# Patient Record
Sex: FEMALE | Race: WHITE | Employment: UNEMPLOYED | URBAN - METROPOLITAN AREA
[De-identification: names, ages, dates, MRNs, and addresses within clinical notes are randomized per-mention and may not be internally consistent; named-entity substitution may affect disease eponyms.]

---

## 2021-02-22 ENCOUNTER — HOSPITAL ENCOUNTER (INPATIENT)
Age: 86
LOS: 6 days | Discharge: NURSING, REHAB OR LONG-TERM CARE | DRG: 522 | End: 2021-02-28
Attending: GENERAL PRACTICE | Admitting: GENERAL PRACTICE
Payer: MEDICARE

## 2021-02-22 ENCOUNTER — APPOINTMENT (OUTPATIENT)
Dept: GENERAL RADIOLOGY | Age: 86
DRG: 522 | End: 2021-02-22
Payer: MEDICARE

## 2021-02-22 DIAGNOSIS — S72.001A CLOSED FRACTURE OF NECK OF RIGHT FEMUR, INITIAL ENCOUNTER (HCC): Primary | ICD-10-CM

## 2021-02-22 PROBLEM — S72.009A HIP FRACTURE, UNSPECIFIED LATERALITY, CLOSED, INITIAL ENCOUNTER (HCC): Status: ACTIVE | Noted: 2021-02-22

## 2021-02-22 LAB
ANION GAP SERPL CALCULATED.3IONS-SCNC: 7 MMOL/L (ref 4–16)
BASOPHILS ABSOLUTE: 0 K/CU MM
BASOPHILS RELATIVE PERCENT: 0.7 % (ref 0–1)
BUN BLDV-MCNC: 25 MG/DL (ref 6–23)
CALCIUM SERPL-MCNC: 8.3 MG/DL (ref 8.3–10.6)
CHLORIDE BLD-SCNC: 106 MMOL/L (ref 99–110)
CO2: 27 MMOL/L (ref 21–32)
CREAT SERPL-MCNC: 1.1 MG/DL (ref 0.6–1.1)
DIFFERENTIAL TYPE: ABNORMAL
EOSINOPHILS ABSOLUTE: 0.1 K/CU MM
EOSINOPHILS RELATIVE PERCENT: 0.9 % (ref 0–3)
GFR AFRICAN AMERICAN: 56 ML/MIN/1.73M2
GFR NON-AFRICAN AMERICAN: 46 ML/MIN/1.73M2
GLUCOSE BLD-MCNC: 129 MG/DL (ref 70–99)
HCT VFR BLD CALC: 35.7 % (ref 37–47)
HEMOGLOBIN: 11 GM/DL (ref 12.5–16)
IMMATURE NEUTROPHIL %: 0.4 % (ref 0–0.43)
INR BLD: 0.91 INDEX
LYMPHOCYTES ABSOLUTE: 0.8 K/CU MM
LYMPHOCYTES RELATIVE PERCENT: 13.5 % (ref 24–44)
MCH RBC QN AUTO: 29.6 PG (ref 27–31)
MCHC RBC AUTO-ENTMCNC: 30.8 % (ref 32–36)
MCV RBC AUTO: 96 FL (ref 78–100)
MONOCYTES ABSOLUTE: 0.2 K/CU MM
MONOCYTES RELATIVE PERCENT: 4 % (ref 0–4)
NUCLEATED RBC %: 0 %
PDW BLD-RTO: 14.6 % (ref 11.7–14.9)
PLATELET # BLD: 154 K/CU MM (ref 140–440)
PMV BLD AUTO: 10.7 FL (ref 7.5–11.1)
POTASSIUM SERPL-SCNC: 4.4 MMOL/L (ref 3.5–5.1)
PROTHROMBIN TIME: 11 SECONDS (ref 11.7–14.5)
RBC # BLD: 3.72 M/CU MM (ref 4.2–5.4)
SARS-COV-2, NAAT: NOT DETECTED
SEGMENTED NEUTROPHILS ABSOLUTE COUNT: 4.5 K/CU MM
SEGMENTED NEUTROPHILS RELATIVE PERCENT: 80.5 % (ref 36–66)
SODIUM BLD-SCNC: 140 MMOL/L (ref 135–145)
SOURCE: NORMAL
TOTAL IMMATURE NEUTOROPHIL: 0.02 K/CU MM
TOTAL NUCLEATED RBC: 0 K/CU MM
WBC # BLD: 5.6 K/CU MM (ref 4–10.5)

## 2021-02-22 PROCEDURE — 6360000002 HC RX W HCPCS: Performed by: GENERAL PRACTICE

## 2021-02-22 PROCEDURE — 73502 X-RAY EXAM HIP UNI 2-3 VIEWS: CPT

## 2021-02-22 PROCEDURE — 1200000000 HC SEMI PRIVATE

## 2021-02-22 PROCEDURE — 96376 TX/PRO/DX INJ SAME DRUG ADON: CPT

## 2021-02-22 PROCEDURE — 85025 COMPLETE CBC W/AUTO DIFF WBC: CPT

## 2021-02-22 PROCEDURE — 36415 COLL VENOUS BLD VENIPUNCTURE: CPT

## 2021-02-22 PROCEDURE — 6360000002 HC RX W HCPCS: Performed by: PHYSICIAN ASSISTANT

## 2021-02-22 PROCEDURE — 96375 TX/PRO/DX INJ NEW DRUG ADDON: CPT

## 2021-02-22 PROCEDURE — 85610 PROTHROMBIN TIME: CPT

## 2021-02-22 PROCEDURE — 96374 THER/PROPH/DIAG INJ IV PUSH: CPT

## 2021-02-22 PROCEDURE — 80048 BASIC METABOLIC PNL TOTAL CA: CPT

## 2021-02-22 PROCEDURE — 71045 X-RAY EXAM CHEST 1 VIEW: CPT

## 2021-02-22 PROCEDURE — 87635 SARS-COV-2 COVID-19 AMP PRB: CPT

## 2021-02-22 PROCEDURE — 99284 EMERGENCY DEPT VISIT MOD MDM: CPT

## 2021-02-22 PROCEDURE — 6370000000 HC RX 637 (ALT 250 FOR IP): Performed by: GENERAL PRACTICE

## 2021-02-22 RX ORDER — DILTIAZEM HYDROCHLORIDE 240 MG/1
240 CAPSULE, COATED, EXTENDED RELEASE ORAL DAILY
COMMUNITY
End: 2022-03-02 | Stop reason: SDUPTHER

## 2021-02-22 RX ORDER — CLONIDINE HYDROCHLORIDE 0.1 MG/1
0.1 TABLET ORAL 2 TIMES DAILY
Status: CANCELLED | OUTPATIENT
Start: 2021-02-22

## 2021-02-22 RX ORDER — SODIUM CHLORIDE 9 MG/ML
INJECTION, SOLUTION INTRAVENOUS CONTINUOUS
Status: DISCONTINUED | OUTPATIENT
Start: 2021-02-22 | End: 2021-02-24 | Stop reason: ALTCHOICE

## 2021-02-22 RX ORDER — ACETAMINOPHEN 325 MG/1
650 TABLET ORAL EVERY 6 HOURS PRN
Status: CANCELLED | OUTPATIENT
Start: 2021-02-22

## 2021-02-22 RX ORDER — ACETAMINOPHEN 650 MG/1
650 SUPPOSITORY RECTAL EVERY 6 HOURS PRN
Status: CANCELLED | OUTPATIENT
Start: 2021-02-22

## 2021-02-22 RX ORDER — SODIUM CHLORIDE 0.9 % (FLUSH) 0.9 %
10 SYRINGE (ML) INJECTION EVERY 12 HOURS SCHEDULED
Status: CANCELLED | OUTPATIENT
Start: 2021-02-22

## 2021-02-22 RX ORDER — MORPHINE SULFATE 4 MG/ML
4 INJECTION, SOLUTION INTRAMUSCULAR; INTRAVENOUS ONCE
Status: COMPLETED | OUTPATIENT
Start: 2021-02-22 | End: 2021-02-22

## 2021-02-22 RX ORDER — POLYETHYLENE GLYCOL 3350 17 G/17G
17 POWDER, FOR SOLUTION ORAL DAILY PRN
Status: CANCELLED | OUTPATIENT
Start: 2021-02-22

## 2021-02-22 RX ORDER — ONDANSETRON 2 MG/ML
4 INJECTION INTRAMUSCULAR; INTRAVENOUS EVERY 6 HOURS PRN
Status: DISCONTINUED | OUTPATIENT
Start: 2021-02-22 | End: 2021-02-28 | Stop reason: HOSPADM

## 2021-02-22 RX ORDER — LOSARTAN POTASSIUM 50 MG/1
50 TABLET ORAL DAILY
Status: CANCELLED | OUTPATIENT
Start: 2021-02-22

## 2021-02-22 RX ORDER — HYDROCHLOROTHIAZIDE 25 MG/1
12.5 TABLET ORAL DAILY
Status: ON HOLD | COMMUNITY
End: 2021-03-12 | Stop reason: HOSPADM

## 2021-02-22 RX ORDER — SODIUM CHLORIDE 9 MG/ML
INJECTION, SOLUTION INTRAVENOUS CONTINUOUS
Status: CANCELLED | OUTPATIENT
Start: 2021-02-22 | End: 2021-02-23

## 2021-02-22 RX ORDER — CLONIDINE HYDROCHLORIDE 0.1 MG/1
0.1 TABLET ORAL 2 TIMES DAILY
COMMUNITY
End: 2021-11-17

## 2021-02-22 RX ORDER — PROMETHAZINE HYDROCHLORIDE 12.5 MG/1
12.5 TABLET ORAL EVERY 6 HOURS PRN
Status: DISCONTINUED | OUTPATIENT
Start: 2021-02-22 | End: 2021-02-28 | Stop reason: HOSPADM

## 2021-02-22 RX ORDER — DILTIAZEM HYDROCHLORIDE 240 MG/1
240 CAPSULE, COATED, EXTENDED RELEASE ORAL DAILY
Status: CANCELLED | OUTPATIENT
Start: 2021-02-22

## 2021-02-22 RX ORDER — ACETAMINOPHEN 325 MG/1
650 TABLET ORAL EVERY 6 HOURS PRN
Status: DISCONTINUED | OUTPATIENT
Start: 2021-02-22 | End: 2021-02-28 | Stop reason: HOSPADM

## 2021-02-22 RX ORDER — GABAPENTIN 100 MG/1
300 CAPSULE ORAL NIGHTLY
COMMUNITY
End: 2022-02-16

## 2021-02-22 RX ORDER — HYDROCHLOROTHIAZIDE 12.5 MG/1
12.5 TABLET ORAL DAILY
Status: CANCELLED | OUTPATIENT
Start: 2021-02-22

## 2021-02-22 RX ORDER — POLYETHYLENE GLYCOL 3350 17 G/17G
17 POWDER, FOR SOLUTION ORAL DAILY PRN
Status: DISCONTINUED | OUTPATIENT
Start: 2021-02-22 | End: 2021-02-28 | Stop reason: HOSPADM

## 2021-02-22 RX ORDER — SODIUM CHLORIDE 0.9 % (FLUSH) 0.9 %
10 SYRINGE (ML) INJECTION PRN
Status: CANCELLED | OUTPATIENT
Start: 2021-02-22

## 2021-02-22 RX ORDER — PROMETHAZINE HYDROCHLORIDE 25 MG/1
12.5 TABLET ORAL EVERY 6 HOURS PRN
Status: CANCELLED | OUTPATIENT
Start: 2021-02-22

## 2021-02-22 RX ORDER — MORPHINE SULFATE 2 MG/ML
2 INJECTION, SOLUTION INTRAMUSCULAR; INTRAVENOUS
Status: DISCONTINUED | OUTPATIENT
Start: 2021-02-22 | End: 2021-02-24

## 2021-02-22 RX ORDER — ASPIRIN 81 MG/1
81 TABLET ORAL DAILY
Status: ON HOLD | COMMUNITY
End: 2021-03-12 | Stop reason: SDUPTHER

## 2021-02-22 RX ORDER — GABAPENTIN 100 MG/1
100 CAPSULE ORAL DAILY
Status: CANCELLED | OUTPATIENT
Start: 2021-02-22

## 2021-02-22 RX ORDER — CLONIDINE HYDROCHLORIDE 0.1 MG/1
0.1 TABLET ORAL 2 TIMES DAILY
Status: DISCONTINUED | OUTPATIENT
Start: 2021-02-22 | End: 2021-02-25

## 2021-02-22 RX ORDER — FENTANYL CITRATE 50 UG/ML
50 INJECTION, SOLUTION INTRAMUSCULAR; INTRAVENOUS
Status: DISCONTINUED | OUTPATIENT
Start: 2021-02-22 | End: 2021-02-22

## 2021-02-22 RX ORDER — ONDANSETRON 2 MG/ML
4 INJECTION INTRAMUSCULAR; INTRAVENOUS EVERY 6 HOURS PRN
Status: CANCELLED | OUTPATIENT
Start: 2021-02-22

## 2021-02-22 RX ORDER — SODIUM CHLORIDE 0.9 % (FLUSH) 0.9 %
10 SYRINGE (ML) INJECTION PRN
Status: DISCONTINUED | OUTPATIENT
Start: 2021-02-22 | End: 2021-02-28 | Stop reason: HOSPADM

## 2021-02-22 RX ORDER — DILTIAZEM HYDROCHLORIDE 240 MG/1
240 CAPSULE, COATED, EXTENDED RELEASE ORAL DAILY
Status: DISCONTINUED | OUTPATIENT
Start: 2021-02-23 | End: 2021-02-28 | Stop reason: HOSPADM

## 2021-02-22 RX ORDER — SODIUM CHLORIDE 0.9 % (FLUSH) 0.9 %
10 SYRINGE (ML) INJECTION EVERY 12 HOURS SCHEDULED
Status: DISCONTINUED | OUTPATIENT
Start: 2021-02-22 | End: 2021-02-28 | Stop reason: HOSPADM

## 2021-02-22 RX ORDER — LOSARTAN POTASSIUM 50 MG/1
50 TABLET ORAL DAILY
COMMUNITY
End: 2021-11-17

## 2021-02-22 RX ORDER — ACETAMINOPHEN 650 MG/1
650 SUPPOSITORY RECTAL EVERY 6 HOURS PRN
Status: DISCONTINUED | OUTPATIENT
Start: 2021-02-22 | End: 2021-02-28 | Stop reason: HOSPADM

## 2021-02-22 RX ORDER — MORPHINE SULFATE 4 MG/ML
2 INJECTION, SOLUTION INTRAMUSCULAR; INTRAVENOUS EVERY 30 MIN PRN
Status: DISCONTINUED | OUTPATIENT
Start: 2021-02-22 | End: 2021-02-22

## 2021-02-22 RX ADMIN — MORPHINE SULFATE 4 MG: 4 INJECTION, SOLUTION INTRAMUSCULAR; INTRAVENOUS at 20:12

## 2021-02-22 RX ADMIN — MORPHINE SULFATE 2 MG: 2 INJECTION, SOLUTION INTRAMUSCULAR; INTRAVENOUS at 23:57

## 2021-02-22 RX ADMIN — MORPHINE SULFATE 2 MG: 4 INJECTION, SOLUTION INTRAMUSCULAR; INTRAVENOUS at 19:45

## 2021-02-22 RX ADMIN — CLONIDINE HYDROCHLORIDE 0.1 MG: 0.1 TABLET ORAL at 23:57

## 2021-02-22 ASSESSMENT — PAIN SCALES - GENERAL
PAINLEVEL_OUTOF10: 10
PAINLEVEL_OUTOF10: 8

## 2021-02-22 NOTE — ED NOTES
Bed: H-08  Expected date:   Expected time:   Means of arrival:   Comments:  Medic-hold f/ T3     Norah Kanner  02/22/21 [id number]

## 2021-02-22 NOTE — ED NOTES
Patients POA would like to updated on patients condition.  [facility] [phone]     Lesli Montgomery RN  02/22/21 Jamey Juares RN  02/22/21 1091

## 2021-02-22 NOTE — ED PROVIDER NOTES
eMERGENCY dEPARTMENT eNCOUnter        [facility]    Chief Complaint   Patient presents with    Hip Pain     right hip pain, outward rotation from fall, +PMS       HPI    Benitze Roberts is a 80 y.o. female who presents after a fall. Patient lives independently at [facility]. She states she tripped over something and \"just went down. \"  Denies hitting her head or LOC. She states this happened about an hour & a half ago and then she had to crawl to the phone because she didn't have her LifeAlert pendant on at the time. Pain to the right hip. Constant, aching, severity 8/10. Worse when she was crawling, better at rest.  She is not on any blood thinners. She denies any other injury. REVIEW OF SYSTEMS    Constitutional:  Denies fever, chills. Eyes:  Denies changes in vision. HENT:  Denies headache, dizziness, lightheadedness. Respiratory:  Denies any shortness of breath. Cardiovascular:  Denies chest pain, palpitations. GI:  Denies abdominal pain, nausea, vomiting, diarrhea. :  Denies dysuria, frequency, urgency, urinary incontinence. Musculoskeletal:  + right hip pain. Integument:  Denies rashes, lesions. Neurologic:  Denies LOC. PAST MEDICAL & SURGICAL HISTORY    No past medical history on file. No past surgical history on file. CURRENT MEDICATIONS        ALLERGIES    No Known Allergies    SOCIAL & FAMILY HISTORY    Social History     Socioeconomic History    Marital status:       Spouse name: Not on file    Number of children: Not on file    Years of education: Not on file    Highest education level: Not on file   Occupational History    Not on file   Social Needs    Financial resource strain: Not on file    Food insecurity     Worry: Not on file     Inability: Not on file    Transportation needs     Medical: Not on file     Non-medical: Not on file   Tobacco Use    Smoking status: Not on file   Substance and Sexual Activity    Alcohol use: Not on file    Drug use: Not on file    Sexual activity: Not on file   Lifestyle    Physical activity     Days per week: Not on file     Minutes per session: Not on file    Stress: Not on file   Relationships    Social connections     Talks on phone: Not on file     Gets together: Not on file     Attends [employer] service: Not on file     Active member of club or organization: Not on file     Attends meetings of clubs or organizations: Not on file     Relationship status: Not on file    Intimate partner violence     Fear of current or ex partner: Not on file     Emotionally abused: Not on file     Physically abused: Not on file     Forced sexual activity: Not on file   Other Topics Concern    Not on file   Social History Narrative    Not on file     No family history on file. PHYSICAL EXAM    VITAL SIGNS: BP (!) 171/74   Pulse 74   Temp 98.2 °F (36.8 °C) (Oral)   Resp 20   Ht 5' 3\" (1.6 m)   Wt 150 lb (68 kg)   SpO2 92%   BMI 26.57 kg/m²   Constitutional:  Well-developed, well-nourished, no acute distress  HENT:  NC/AT. Respiratory:  Normal respiratory effort. Cardiovascular:  RRR. GI:  Abdomen soft, non-tender. :  No CVA tenderness. Musculoskeletal:  Slight shortening, external rotation of the RLE with ttp over the lateral hip. DP intact, able to wiggle toes. Sensation intact. Integument:  Well hydrated. No abrasions, no lacerations. Neurologic:  Alert and oriented. RADIOLOGY/PROCEDURES    Xr Chest Portable    Result Date: 2/22/2021  EXAMINATION: ONE XRAY VIEW OF THE CHEST 2/22/2021 7:29 pm COMPARISON: None. HISTORY: ORDERING SYSTEM PROVIDED HISTORY: fall TECHNOLOGIST PROVIDED HISTORY: Reason for exam:->fall Reason for Exam: fall Acuity: Acute Type of Exam: Initial FINDINGS: The lungs are without acute focal process. There is no effusion or pneumothorax. The cardiomediastinal silhouette is without acute process. The osseous structures are without acute process. No acute process.      Xr Hip 2-3 Vw W Pelvis Right    Result Date: 2/22/2021  EXAMINATION: ONE XRAY VIEW OF THE PELVIS AND TWO XRAY VIEWS RIGHT HIP 2/22/2021 7:29 pm COMPARISON: None. HISTORY: ORDERING SYSTEM PROVIDED HISTORY: fall TECHNOLOGIST PROVIDED HISTORY: Reason for exam:->fall Reason for Exam: fall Acuity: Acute Type of Exam: Initial FINDINGS: There is a mildly offset acute right femoral neck fracture. Generalized osteopenia. No additional fractures or evidence of dislocation. No pathologic bone lesion. Visualized soft tissues are within normal limits. There is a mildly offset acute right femoral neck fracture. Labs Reviewed   CBC WITH AUTO DIFFERENTIAL - Abnormal; Notable for the following components:       Result Value    RBC 3.72 (*)     Hemoglobin 11.0 (*)     Hematocrit 35.7 (*)     MCHC 30.8 (*)     Segs Relative 80.5 (*)     Lymphocytes % 13.5 (*)     All other components within normal limits   BASIC METABOLIC PANEL W/ REFLEX TO MG FOR LOW K - Abnormal; Notable for the following components:    BUN 25 (*)     Glucose 129 (*)     GFR Non- 46 (*)     GFR  56 (*)     All other components within normal limits   PROTIME-INR - Abnormal; Notable for the following components:    Protime 11.0 (*)     All other components within normal limits   COMPREHENSIVE METABOLIC PANEL   MAGNESIUM     ED COURSE & MEDICAL DECISION MAKING    -  Patient seen and evaluated in the emergency department. -  Triage and nursing notes reviewed and incorporated. -  Old chart records reviewed and incorporated. -  Supervising physician was Dr. Alfonso Mukherjee. Patient was seen independently. -  Differential diagnosis includes: fracture, dislocation, osteoarthritis, bursitis, infectious process, and others  -  Work-up included:  See above  -  ED medications:  Morphine, Fentanyl  -  Results discussed with patient.  + right hip fracture. I spoke with Dr. Loyd Braswell who will take patient to the OR tomorrow.   Dr. Attila Muir, covering patient's PCP Yosef Martin PA-C, will admit. In light of current events, I did utilize appropriate PPE (including N95 and surgical face mask, safety glasses, and gloves, as recommended by the health facility/national standard best practice, during my bedside interactions with the patient. FINAL IMPRESSION    1.  Closed fracture of neck of right femur, initial encounter [facility])                    Kerrie Forbes PA-C  02/22/21 2381

## 2021-02-23 ENCOUNTER — APPOINTMENT (OUTPATIENT)
Dept: GENERAL RADIOLOGY | Age: 86
DRG: 522 | End: 2021-02-23
Payer: MEDICARE

## 2021-02-23 ENCOUNTER — ANESTHESIA EVENT (OUTPATIENT)
Dept: OPERATING ROOM | Age: 86
DRG: 522 | End: 2021-02-23
Payer: MEDICARE

## 2021-02-23 ENCOUNTER — ANESTHESIA (OUTPATIENT)
Dept: OPERATING ROOM | Age: 86
DRG: 522 | End: 2021-02-23
Payer: MEDICARE

## 2021-02-23 VITALS — TEMPERATURE: 98 F | SYSTOLIC BLOOD PRESSURE: 133 MMHG | DIASTOLIC BLOOD PRESSURE: 58 MMHG | OXYGEN SATURATION: 100 %

## 2021-02-23 LAB
ALBUMIN SERPL-MCNC: 3.8 GM/DL (ref 3.4–5)
ALP BLD-CCNC: 135 IU/L (ref 40–128)
ALT SERPL-CCNC: 173 U/L (ref 10–40)
ANION GAP SERPL CALCULATED.3IONS-SCNC: 8 MMOL/L (ref 4–16)
AST SERPL-CCNC: 313 IU/L (ref 15–37)
BASOPHILS ABSOLUTE: 0 K/CU MM
BASOPHILS RELATIVE PERCENT: 0.2 % (ref 0–1)
BILIRUB SERPL-MCNC: 1.2 MG/DL (ref 0–1)
BUN BLDV-MCNC: 21 MG/DL (ref 6–23)
CALCIUM SERPL-MCNC: 8.6 MG/DL (ref 8.3–10.6)
CHLORIDE BLD-SCNC: 102 MMOL/L (ref 99–110)
CO2: 27 MMOL/L (ref 21–32)
CREAT SERPL-MCNC: 1.1 MG/DL (ref 0.6–1.1)
DIFFERENTIAL TYPE: ABNORMAL
EOSINOPHILS ABSOLUTE: 0 K/CU MM
EOSINOPHILS RELATIVE PERCENT: 0.1 % (ref 0–3)
GFR AFRICAN AMERICAN: 56 ML/MIN/1.73M2
GFR NON-AFRICAN AMERICAN: 46 ML/MIN/1.73M2
GLUCOSE BLD-MCNC: 135 MG/DL (ref 70–99)
HCT VFR BLD CALC: 39.3 % (ref 37–47)
HEMOGLOBIN: 11.9 GM/DL (ref 12.5–16)
IMMATURE NEUTROPHIL %: 0.5 % (ref 0–0.43)
LYMPHOCYTES ABSOLUTE: 0.7 K/CU MM
LYMPHOCYTES RELATIVE PERCENT: 7.9 % (ref 24–44)
MAGNESIUM: 1.8 MG/DL (ref 1.8–2.4)
MCH RBC QN AUTO: 29 PG (ref 27–31)
MCHC RBC AUTO-ENTMCNC: 30.3 % (ref 32–36)
MCV RBC AUTO: 95.9 FL (ref 78–100)
MONOCYTES ABSOLUTE: 0.5 K/CU MM
MONOCYTES RELATIVE PERCENT: 5 % (ref 0–4)
NUCLEATED RBC %: 0 %
PDW BLD-RTO: 14.6 % (ref 11.7–14.9)
PLATELET # BLD: 169 K/CU MM (ref 140–440)
PMV BLD AUTO: 10.5 FL (ref 7.5–11.1)
POTASSIUM SERPL-SCNC: 4.8 MMOL/L (ref 3.5–5.1)
RBC # BLD: 4.1 M/CU MM (ref 4.2–5.4)
SEGMENTED NEUTROPHILS ABSOLUTE COUNT: 8.1 K/CU MM
SEGMENTED NEUTROPHILS RELATIVE PERCENT: 86.3 % (ref 36–66)
SODIUM BLD-SCNC: 137 MMOL/L (ref 135–145)
TOTAL IMMATURE NEUTOROPHIL: 0.05 K/CU MM
TOTAL NUCLEATED RBC: 0 K/CU MM
TOTAL PROTEIN: 6.1 GM/DL (ref 6.4–8.2)
WBC # BLD: 9.4 K/CU MM (ref 4–10.5)

## 2021-02-23 PROCEDURE — 2580000003 HC RX 258: Performed by: PHYSICIAN ASSISTANT

## 2021-02-23 PROCEDURE — 93005 ELECTROCARDIOGRAM TRACING: CPT | Performed by: ANESTHESIOLOGY

## 2021-02-23 PROCEDURE — 83735 ASSAY OF MAGNESIUM: CPT

## 2021-02-23 PROCEDURE — 2580000003 HC RX 258: Performed by: ANESTHESIOLOGY

## 2021-02-23 PROCEDURE — 6360000002 HC RX W HCPCS: Performed by: PHYSICIAN ASSISTANT

## 2021-02-23 PROCEDURE — 2720000010 HC SURG SUPPLY STERILE: Performed by: ORTHOPAEDIC SURGERY

## 2021-02-23 PROCEDURE — 3700000001 HC ADD 15 MINUTES (ANESTHESIA): Performed by: ORTHOPAEDIC SURGERY

## 2021-02-23 PROCEDURE — 36415 COLL VENOUS BLD VENIPUNCTURE: CPT

## 2021-02-23 PROCEDURE — C1713 ANCHOR/SCREW BN/BN,TIS/BN: HCPCS | Performed by: ORTHOPAEDIC SURGERY

## 2021-02-23 PROCEDURE — 7100000000 HC PACU RECOVERY - FIRST 15 MIN: Performed by: ORTHOPAEDIC SURGERY

## 2021-02-23 PROCEDURE — 80053 COMPREHEN METABOLIC PANEL: CPT

## 2021-02-23 PROCEDURE — 6370000000 HC RX 637 (ALT 250 FOR IP): Performed by: GENERAL PRACTICE

## 2021-02-23 PROCEDURE — 7100000001 HC PACU RECOVERY - ADDTL 15 MIN: Performed by: ORTHOPAEDIC SURGERY

## 2021-02-23 PROCEDURE — 6360000002 HC RX W HCPCS: Performed by: GENERAL PRACTICE

## 2021-02-23 PROCEDURE — 0SRR0J9 REPLACEMENT OF RIGHT HIP JOINT, FEMORAL SURFACE WITH SYNTHETIC SUBSTITUTE, CEMENTED, OPEN APPROACH: ICD-10-PCS | Performed by: ORTHOPAEDIC SURGERY

## 2021-02-23 PROCEDURE — 3600000014 HC SURGERY LEVEL 4 ADDTL 15MIN: Performed by: ORTHOPAEDIC SURGERY

## 2021-02-23 PROCEDURE — 85025 COMPLETE CBC W/AUTO DIFF WBC: CPT

## 2021-02-23 PROCEDURE — C1776 JOINT DEVICE (IMPLANTABLE): HCPCS | Performed by: ORTHOPAEDIC SURGERY

## 2021-02-23 PROCEDURE — 73501 X-RAY EXAM HIP UNI 1 VIEW: CPT

## 2021-02-23 PROCEDURE — 2580000003 HC RX 258: Performed by: GENERAL PRACTICE

## 2021-02-23 PROCEDURE — 99222 1ST HOSP IP/OBS MODERATE 55: CPT | Performed by: ORTHOPAEDIC SURGERY

## 2021-02-23 PROCEDURE — 27236 TREAT THIGH FRACTURE: CPT | Performed by: PHYSICIAN ASSISTANT

## 2021-02-23 PROCEDURE — 94761 N-INVAS EAR/PLS OXIMETRY MLT: CPT

## 2021-02-23 PROCEDURE — 6370000000 HC RX 637 (ALT 250 FOR IP): Performed by: PHYSICIAN ASSISTANT

## 2021-02-23 PROCEDURE — 2709999900 HC NON-CHARGEABLE SUPPLY: Performed by: ORTHOPAEDIC SURGERY

## 2021-02-23 PROCEDURE — 93010 ELECTROCARDIOGRAM REPORT: CPT | Performed by: INTERNAL MEDICINE

## 2021-02-23 PROCEDURE — 3700000000 HC ANESTHESIA ATTENDED CARE: Performed by: ORTHOPAEDIC SURGERY

## 2021-02-23 PROCEDURE — 6360000002 HC RX W HCPCS

## 2021-02-23 PROCEDURE — 2700000000 HC OXYGEN THERAPY PER DAY

## 2021-02-23 PROCEDURE — 6360000002 HC RX W HCPCS: Performed by: ORTHOPAEDIC SURGERY

## 2021-02-23 PROCEDURE — 3600000004 HC SURGERY LEVEL 4 BASE: Performed by: ORTHOPAEDIC SURGERY

## 2021-02-23 PROCEDURE — 27236 TREAT THIGH FRACTURE: CPT | Performed by: ORTHOPAEDIC SURGERY

## 2021-02-23 PROCEDURE — 1200000000 HC SEMI PRIVATE

## 2021-02-23 DEVICE — AVENIR® STANDARD STEM CEMENTED 1
Type: IMPLANTABLE DEVICE | Site: HIP | Status: FUNCTIONAL
Brand: AVENIR®

## 2021-02-23 DEVICE — IMPLANTABLE DEVICE: Type: IMPLANTABLE DEVICE | Site: HIP | Status: FUNCTIONAL

## 2021-02-23 DEVICE — ADAPTER FEM L-4MM UPLR NEUT NK: Type: IMPLANTABLE DEVICE | Site: HIP | Status: FUNCTIONAL

## 2021-02-23 DEVICE — CEMENT BNE 40GM HI VISC RADPQ FOR REV SURG: Type: IMPLANTABLE DEVICE | Site: HIP | Status: FUNCTIONAL

## 2021-02-23 RX ORDER — HYDROCODONE BITARTRATE AND ACETAMINOPHEN 5; 325 MG/1; MG/1
1 TABLET ORAL EVERY 6 HOURS PRN
Status: DISCONTINUED | OUTPATIENT
Start: 2021-02-23 | End: 2021-02-25

## 2021-02-23 RX ORDER — ONDANSETRON 2 MG/ML
4 INJECTION INTRAMUSCULAR; INTRAVENOUS
Status: DISCONTINUED | OUTPATIENT
Start: 2021-02-23 | End: 2021-02-23 | Stop reason: HOSPADM

## 2021-02-23 RX ORDER — PROPOFOL 10 MG/ML
INJECTION, EMULSION INTRAVENOUS CONTINUOUS PRN
Status: DISCONTINUED | OUTPATIENT
Start: 2021-02-23 | End: 2021-02-23 | Stop reason: SDUPTHER

## 2021-02-23 RX ORDER — FENTANYL CITRATE 50 UG/ML
25 INJECTION, SOLUTION INTRAMUSCULAR; INTRAVENOUS EVERY 5 MIN PRN
Status: DISCONTINUED | OUTPATIENT
Start: 2021-02-23 | End: 2021-02-23 | Stop reason: HOSPADM

## 2021-02-23 RX ORDER — SODIUM CHLORIDE, SODIUM LACTATE, POTASSIUM CHLORIDE, CALCIUM CHLORIDE 600; 310; 30; 20 MG/100ML; MG/100ML; MG/100ML; MG/100ML
INJECTION, SOLUTION INTRAVENOUS CONTINUOUS
Status: DISCONTINUED | OUTPATIENT
Start: 2021-02-23 | End: 2021-02-24 | Stop reason: ALTCHOICE

## 2021-02-23 RX ORDER — LIDOCAINE HYDROCHLORIDE 20 MG/ML
INJECTION, SOLUTION INTRAVENOUS PRN
Status: DISCONTINUED | OUTPATIENT
Start: 2021-02-23 | End: 2021-02-23 | Stop reason: SDUPTHER

## 2021-02-23 RX ORDER — HYDRALAZINE HYDROCHLORIDE 20 MG/ML
5 INJECTION INTRAMUSCULAR; INTRAVENOUS EVERY 10 MIN PRN
Status: DISCONTINUED | OUTPATIENT
Start: 2021-02-23 | End: 2021-02-23 | Stop reason: HOSPADM

## 2021-02-23 RX ORDER — LABETALOL HYDROCHLORIDE 5 MG/ML
5 INJECTION, SOLUTION INTRAVENOUS EVERY 10 MIN PRN
Status: DISCONTINUED | OUTPATIENT
Start: 2021-02-23 | End: 2021-02-23 | Stop reason: HOSPADM

## 2021-02-23 RX ORDER — HYDROMORPHONE HCL 110MG/55ML
0.25 PATIENT CONTROLLED ANALGESIA SYRINGE INTRAVENOUS EVERY 5 MIN PRN
Status: DISCONTINUED | OUTPATIENT
Start: 2021-02-23 | End: 2021-02-23 | Stop reason: HOSPADM

## 2021-02-23 RX ADMIN — HYDROCODONE BITARTRATE AND ACETAMINOPHEN 1 TABLET: 5; 325 TABLET ORAL at 23:02

## 2021-02-23 RX ADMIN — SODIUM CHLORIDE, POTASSIUM CHLORIDE, SODIUM LACTATE AND CALCIUM CHLORIDE: 600; 310; 30; 20 INJECTION, SOLUTION INTRAVENOUS at 08:30

## 2021-02-23 RX ADMIN — CEFAZOLIN SODIUM 2000 MG: 10 INJECTION, POWDER, FOR SOLUTION INTRAVENOUS at 18:49

## 2021-02-23 RX ADMIN — PHENYLEPHRINE HYDROCHLORIDE 100 MCG: 10 INJECTION INTRAVENOUS at 10:17

## 2021-02-23 RX ADMIN — CLONIDINE HYDROCHLORIDE 0.1 MG: 0.1 TABLET ORAL at 09:14

## 2021-02-23 RX ADMIN — SODIUM CHLORIDE, PRESERVATIVE FREE 10 ML: 5 INJECTION INTRAVENOUS at 21:58

## 2021-02-23 RX ADMIN — PHENYLEPHRINE HYDROCHLORIDE 100 MCG: 10 INJECTION INTRAVENOUS at 10:39

## 2021-02-23 RX ADMIN — SODIUM CHLORIDE: 9 INJECTION, SOLUTION INTRAVENOUS at 11:55

## 2021-02-23 RX ADMIN — SODIUM CHLORIDE: 9 INJECTION, SOLUTION INTRAVENOUS at 00:02

## 2021-02-23 RX ADMIN — HYDROCODONE BITARTRATE AND ACETAMINOPHEN 1 TABLET: 5; 325 TABLET ORAL at 14:59

## 2021-02-23 RX ADMIN — MORPHINE SULFATE 2 MG: 2 INJECTION, SOLUTION INTRAMUSCULAR; INTRAVENOUS at 02:21

## 2021-02-23 RX ADMIN — MORPHINE SULFATE 2 MG: 2 INJECTION, SOLUTION INTRAMUSCULAR; INTRAVENOUS at 21:58

## 2021-02-23 RX ADMIN — SODIUM CHLORIDE, POTASSIUM CHLORIDE, SODIUM LACTATE AND CALCIUM CHLORIDE: 600; 310; 30; 20 INJECTION, SOLUTION INTRAVENOUS at 09:59

## 2021-02-23 RX ADMIN — MORPHINE SULFATE 2 MG: 2 INJECTION, SOLUTION INTRAMUSCULAR; INTRAVENOUS at 14:02

## 2021-02-23 RX ADMIN — ONDANSETRON 4 MG: 2 INJECTION INTRAMUSCULAR; INTRAVENOUS at 14:02

## 2021-02-23 RX ADMIN — LIDOCAINE HYDROCHLORIDE 60 MG: 20 INJECTION, SOLUTION INTRAVENOUS at 10:01

## 2021-02-23 RX ADMIN — PROPOFOL 5 MCG/KG/MIN: 10 INJECTION, EMULSION INTRAVENOUS at 10:07

## 2021-02-23 RX ADMIN — PHENYLEPHRINE HYDROCHLORIDE 100 MCG: 10 INJECTION INTRAVENOUS at 10:07

## 2021-02-23 RX ADMIN — CEFAZOLIN SODIUM 2 G: 10 INJECTION, POWDER, FOR SOLUTION INTRAVENOUS at 10:15

## 2021-02-23 RX ADMIN — CLONIDINE HYDROCHLORIDE 0.1 MG: 0.1 TABLET ORAL at 21:57

## 2021-02-23 ASSESSMENT — PAIN DESCRIPTION - ORIENTATION
ORIENTATION: RIGHT
ORIENTATION: RIGHT

## 2021-02-23 ASSESSMENT — PULMONARY FUNCTION TESTS
PIF_VALUE: 0
PIF_VALUE: 1
PIF_VALUE: 0

## 2021-02-23 ASSESSMENT — PAIN SCALES - GENERAL
PAINLEVEL_OUTOF10: 0
PAINLEVEL_OUTOF10: 8

## 2021-02-23 ASSESSMENT — PAIN DESCRIPTION - PAIN TYPE: TYPE: ACUTE PAIN

## 2021-02-23 ASSESSMENT — PAIN - FUNCTIONAL ASSESSMENT
PAIN_FUNCTIONAL_ASSESSMENT: PREVENTS OR INTERFERES SOME ACTIVE ACTIVITIES AND ADLS
PAIN_FUNCTIONAL_ASSESSMENT: PREVENTS OR INTERFERES SOME ACTIVE ACTIVITIES AND ADLS

## 2021-02-23 ASSESSMENT — PAIN DESCRIPTION - FREQUENCY
FREQUENCY: CONTINUOUS

## 2021-02-23 ASSESSMENT — PAIN DESCRIPTION - LOCATION: LOCATION: HIP

## 2021-02-23 ASSESSMENT — PAIN DESCRIPTION - ONSET: ONSET: ON-GOING

## 2021-02-23 ASSESSMENT — PAIN DESCRIPTION - DESCRIPTORS: DESCRIPTORS: ACHING

## 2021-02-23 ASSESSMENT — PAIN DESCRIPTION - PROGRESSION: CLINICAL_PROGRESSION: NOT CHANGED

## 2021-02-23 NOTE — OP NOTE
treatment options. Risks and benefits were reviewed. All questions answered. Procedure  Informed consent confirmed. Operative site was marked. All questions answered. Patient was brought to the operative suite. Spinal anesthesia was administered. Patient was positioned in the lateral position secured on the pegboard. Axillary roll was placed. Right lower extremity was prepped and draped in standard fashion. Formal timeout was performed and complete. I then proceeded with a posterior approach of the hip. Identified reflected and tagged the piriformis. I then performed L capsulotomy. Femoral neck was exposed, and the femoral neck osteotomy was performed, moving all excess bone. Once the femoral neck was removed there is improved exposure to the femoral head. The femoral head was removed and sized at just over 43 mm. Weighted AP C retractor was placed, along with a Hohmann at the lesser trochanter. Box punch was utilized, this was followed by the canal finding reamer and a lateralizing rasp. I then used the #1 broach which was able to be seated completely. As I was bringing the second can broach down it was not able to be completely/fully seated. I elected to cement a size 1 femoral stem in place. Cement restrictor was placed. Canal brush was used. I thoroughly irrigated the femoral canal.  Dried canal.  And then cemented a size 1 femoral stem in place, following the patient's femoral neck version. Excess cement was removed. We then allowed the cement to cure. I initially trialed with a 43 mm head neutral neck. She had good stability however I felt her leg lengths were slightly long. I then trialed with a 43 mm head and a -4 neck. We had excellent stability and more equalized leg lengths. Trial implants were removed. Wound was thoroughly irrigated. I then impacted the final components 43 mm head, -4 mm neck. The hip was reduced I was satisfied with leg lengths and hip stability. Hip was able to be to flex to 90 degrees and internal rotation to near 90 degrees before subluxation. Wound was thoroughly irrigated. The capsule was closed with 0 Vicryl, #1 Vicryl. The piriformis was repaired with #5 Ethibond. Fascia was closed with #1 Vicryl. Deep fatty layer was reapproximated with 0 Vicryl. Skin edges were reapproximated with #2 Vicryl and staples. Silver impregnated dressing was applied. Abduction pillow was applied. Anesthesia was withdrawn and she was taken to postanesthesia care unit in stable condition.   Dre Bauer PA-C was present and assisted throughout the case    Electronically signed by Genaro Bryan MD on 2/23/2021 at 11:58 AM

## 2021-02-23 NOTE — PROGRESS NOTES
[phone] patient received post op from the OR monitor placed and alarms on. Report received from [employer]. Spinal intra op per report   1135 sensation noted to bilateral knees unable to wiggle her toes at this time   1156 xray done per order  [id number] turned and extra linen removed no redness or bleeding on insertion site of spinal  1222 Report called to [employer] prior to transport.    1232 pacu phase one care complete awaiting for transporter   1235 transferred back to room 4128 via bed per transporter Yulia glasses and denture cup

## 2021-02-23 NOTE — PROGRESS NOTES
Ortho Progress note    A/P Aziza Ards is a 80 y.o. female Right closed displaced femoral neck fracture    1.) NPO after midnight  2.) bedrest  3.) Ancef 2g IV OCTOR  4.) Consent for right cemented hip hemiarthroplasty  Risks and benefits to be discussed in the morning  5.) keep heels off of bed with blanket or pillow      GOAL for surgical intervention tomorrow morning if medically optimized and risks assessment complete

## 2021-02-23 NOTE — ANESTHESIA POSTPROCEDURE EVALUATION
Department of Anesthesiology  Postprocedure Note    Patient: Bella Renee  MRN: [id number]  YOB: 1923  Date of evaluation: 2/23/2021  Time:  11:38 AM     Procedure Summary     Date: 02/23/21 Room / Location: [address]    Anesthesia Start: 8647 Anesthesia Stop:     Procedure: HIP HEMIARTHROPLASTY RIGHT (Right Hip) Diagnosis: (right hip fracture)    Surgeons: Dior Hodge MD Responsible Provider: Aisha Ramirez MD    Anesthesia Type: spinal, regional ASA Status: 3          Anesthesia Type: No value filed. Jennifer Phase I:      Jennifer Phase II:      Last vitals: Reviewed and per EMR flowsheets.        Anesthesia Post Evaluation    Patient location during evaluation: PACU  Patient participation: complete - patient participated  Level of consciousness: awake and alert  Airway patency: patent  Nausea & Vomiting: no nausea and no vomiting  Complications: no  Cardiovascular status: hemodynamically stable and blood pressure returned to baseline  Respiratory status: acceptable, spontaneous ventilation, nonlabored ventilation and nasal cannula  Hydration status: stable

## 2021-02-23 NOTE — ED NOTES
XRAY Results    Impression   There is a mildly offset acute right femoral neck fracture.              William Santos RN  02/22/21 1952

## 2021-02-23 NOTE — CONSULTS
Not on file    Number of children: Not on file    Years of education: Not on file    Highest education level: Not on file   Occupational History    Not on file   Social Needs    Financial resource strain: Not on file    Food insecurity     Worry: Not on file     Inability: Not on file    Transportation needs     Medical: Not on file     Non-medical: Not on file   Tobacco Use    Smoking status: Not on file   Substance and Sexual Activity    Alcohol use: Not on file    Drug use: Not on file    Sexual activity: Not on file   Lifestyle    Physical activity     Days per week: Not on file     Minutes per session: Not on file    Stress: Not on file   Relationships    Social connections     Talks on phone: Not on file     Gets together: Not on file     Attends Religion service: Not on file     Active member of club or organization: Not on file     Attends meetings of clubs or organizations: Not on file     Relationship status: Not on file    Intimate partner violence     Fear of current or ex partner: Not on file     Emotionally abused: Not on file     Physically abused: Not on file     Forced sexual activity: Not on file   Other Topics Concern    Not on file   Social History Narrative    Not on file       REVIEW OF SYSTEMS  Comprehensive ROS completed. In specific,  - Constitutional: Denies fevers, chills, fatigue, weakness, unexpected weight loss/gain  - Cardiovascular: Denies chest pain, shortness of breath, palpitation and dyspnea on exertion  - Musculoskeletal: pain right hip  - Neuro: Denies numbness, tingling, paresthesias, loss of consciousness, dizziness  - Skin: Denies ulceration, bruising, scars, open wounds    All other systems reviewed and are negative unless otherwise stated above or in the HPI.     PHYSICAL EXAM  VITAL SIGNS: BP (!) 176/77   Pulse 84   Temp 98.1 °F (36.7 °C) (Oral)   Resp 18   Ht 5' 3\" (1.6 m)   Wt 150 lb (68 kg)   SpO2 99%   BMI 26.57 kg/m²   General Appearance Alert, well appearing, No acute distress   Eyes clear   Ears, Nose, Throat clear    Neck Supple, non tender   Respiratory Unlabored   Cardiovascular Heart regular rate and rythm   Gastrointestinal Abdomen: non-distended   Lymphatics No adenopathy   Musculoskeletal RLE- SILT; CR<2 sec; unable to demonstrate SLR; leg slightly shortened and ER; thigh and calf soft/NT; pain palpable at hip and with gentle logroll; no pain distally; wiggles toes and PF/DF ankle actively; NV intact. Skin Normal. No rash or lesions   Neurological Awake, alert and oriented. No focal deficits. Motor and sensory intact   Psychiatric Normal       LABS   Recent Labs     02/22/21  1900   WBC 5.6   HCT 35.7*         K 4.4      CO2 27   BUN 25*   CREATININE 1.1   CALCIUM 8.3   INR 0.91     No components found for: HGBA1C    IMAGING  Narrative   EXAMINATION:   ONE XRAY VIEW OF THE PELVIS AND TWO XRAY VIEWS RIGHT HIP       2/22/2021 7:29 pm       COMPARISON:   None.       HISTORY:   ORDERING SYSTEM PROVIDED HISTORY: fall   TECHNOLOGIST PROVIDED HISTORY:   Reason for exam:->fall   Reason for Exam: fall   Acuity: Acute   Type of Exam: Initial       FINDINGS:   There is a mildly offset acute right femoral neck fracture.  Generalized   osteopenia.  No additional fractures or evidence of dislocation.  No   pathologic bone lesion.  Visualized soft tissues are within normal limits.           Impression   There is a mildly offset acute right femoral neck fracture.               ASSESSMENT     Patient Active Problem List   Diagnosis    Hip fracture, unspecified laterality, closed, initial encounter ([facility] [address]        80 y.o. female with right acute closed displaced femoral neck fracture   PLAN   1. Consent for right cemented hip hemiarthroplasty. Risks/benefits/alternatives discussed. Patient agreeable to surgery and plan discussed with POA by Dr. iRco De Paz. 2. NPO. Bedrest/NWB RLE. 3. Ancef 2g IV OCTOR  4. Discussed with Dr. Varela Forth. Electronically signed by: Av Montano PA-C, 2/23/2021 7:55 AM

## 2021-02-23 NOTE — ANESTHESIA PRE PROCEDURE
Department of Anesthesiology  Preprocedure Note       Name:  Aneta Prakash   Age:  80 y.o.  :  1923                                          MRN:  [id number]         Date:  2021      Surgeon: Yonatan Mckeon):  Poornima Norris MD    Procedure: Procedure(s):  HIP HEMIARTHROPLASTY RIGHT    Medications prior to admission:   Prior to Admission medications    Medication Sig Start Date End Date Taking? Authorizing Provider   cloNIDine (CATAPRES) 0.1 MG tablet Take 0.1 mg by mouth 2 times daily   Yes Historical Provider, MD   losartan (COZAAR) 50 MG tablet Take 50 mg by mouth daily   Yes Historical Provider, MD   hydroCHLOROthiazide (HYDRODIURIL) 25 MG tablet Take 12.5 mg by mouth daily    Yes Historical Provider, MD   gabapentin (NEURONTIN) 100 MG capsule Take 100 mg by mouth 2 times daily as needed.     Yes Historical Provider, MD   aspirin 81 MG EC tablet Take 81 mg by mouth daily   Yes Historical Provider, MD   dilTIAZem (CARDIZEM CD) 240 MG extended release capsule Take 240 mg by mouth daily   Yes Historical Provider, MD       Current medications:    Current Facility-Administered Medications   Medication Dose Route Frequency Provider Last Rate Last Admin    dilTIAZem (CARDIZEM CD) extended release capsule 240 mg  240 mg Oral Daily Cornell French MD        cloNIDine (CATAPRES) tablet 0.1 mg  0.1 mg Oral BID Cornell French MD   0.1 mg at 21 0339    sodium chloride flush 0.9 % injection 10 mL  10 mL Intravenous 2 times per day Angel Masy MD        sodium chloride flush 0.9 % injection 10 mL  10 mL Intravenous PRN Cornell French MD        enoxaparin (LOVENOX) injection 30 mg  30 mg Subcutaneous Daily Cornell French MD        promethazine (PHENERGAN) tablet 12.5 mg  12.5 mg Oral Q6H PRN Cornell French MD        Or    ondansetron (ZOFRAN) injection 4 mg  4 mg Intravenous Q6H PRN Cornell French MD        polyethylene glycol [facility]) 14.6 02/23/2021     02/23/2021       CMP:   Lab Results   Component Value Date     02/23/2021    K 4.8 02/23/2021     02/23/2021    CO2 27 02/23/2021    BUN 21 02/23/2021    CREATININE 1.1 02/23/2021    GFRAA 56 02/23/2021    LABGLOM 46 02/23/2021    GLUCOSE 135 02/23/2021    PROT 6.1 02/23/2021    CALCIUM 8.6 02/23/2021    BILITOT 1.2 02/23/2021    ALKPHOS 135 02/23/2021     02/23/2021     02/23/2021       POC Tests: No results for input(s): POCGLU, POCNA, POCK, POCCL, POCBUN, POCHEMO, POCHCT in the last 72 hours. Coags:   Lab Results   Component Value Date    PROTIME 11.0 02/22/2021    INR 0.91 02/22/2021       HCG (If Applicable): No results found for: PREGTESTUR, PREGSERUM, HCG, HCGQUANT     ABGs: No results found for: PHART, PO2ART, ITQ4JYG, MTE6RPF, BEART, Q1XTXVYF     Type & Screen (If Applicable):  No results found for: LABABO, LABRH    Drug/Infectious Status (If Applicable):  No results found for: HIV, HEPCAB    COVID-19 Screening (If Applicable):   Lab Results   Component Value Date    COVID19 NOT DETECTED 02/22/2021         Anesthesia Evaluation  Patient summary reviewed and Nursing notes reviewed  Airway: Mallampati: II  TM distance: >3 FB   Neck ROM: full  Mouth opening: > = 3 FB Dental:    (+) upper dentures      Pulmonary:normal exam                               Cardiovascular:  Exercise tolerance: poor (<4 METS),   (+) hypertension:,       ECG reviewed  Rhythm: regular  Rate: normal                    Neuro/Psych:               GI/Hepatic/Renal:             Endo/Other:                     Abdominal:           Vascular:                                      Anesthesia Plan      spinal and regional     ASA 3             Anesthetic plan and risks discussed with patient. Use of blood products discussed with patient whom.                    Trey Mcnulty MD   2/23/2021

## 2021-02-23 NOTE — BRIEF OP NOTE
Brief Postoperative Note      Patient: Reyes Humphreys  YOB: 1923  MRN: [id number]    Date of Procedure: 2/23/2021    Pre-Op Diagnosis: right hip fracture    Post-Op Diagnosis: Right closed displaced femoral neck fracture       Procedure(s):  HIP HEMIARTHROPLASTY RIGHT    Surgeon(s):  Luis Amador MD    Assistant:  Physician Assistant: Galina Benjamin PA-C    Anesthesia: General    Estimated Blood Loss (mL): 161     Complications: None    Specimens:   * No specimens in log *    Implants:  Implant Name Type Inv.  Item Serial No.  Lot No. LRB No. Used Action   CEMENT BNE 40GM HI VISC RADPQ FOR REV SURG  CEMENT BNE 40GM HI VISC RADPQ FOR REV SURG  [employer] [id number] Right 2 Implanted   STEM FEM ASTRID 1 135 DEG  MM HIP PROTASUL-S30 SS AVENIR  STEM FEM ASTRID 1 135 DEG  MM HIP PROTASUL-S30 SS AVENIR  [employer] [id number] Right 1 Implanted   ADAPTER FEM L-4MM UPLR NEUT NK  ADAPTER FEM L-4MM UPLR NEUT NK  [employer] [id number] Right 1 Implanted   HEAD FEM [id number] UPLR CO CHROM MOD 12/14 TAPR LEG  HEAD FEM [id number] UPLR CO CHROM MOD 12/14 TAPR LEG  [employer] [id number] Right 1 Implanted         Drains:   Urethral Catheter (Active)   Catheter Indications Perioperative use in selected surgeries including but not limited to urologic, pelvic or need for intraoperative monitoring of urinary output due to prolonged surgery, large volume infusion or need for diuretic therapy in surgery 02/23/21 0215   Site Assessment Pink 02/23/21 0215   Urine Color Yellow 02/23/21 0215   Urine Appearance Clear 02/23/21 0215   Urine Odor Malodorous 02/23/21 0215   Output (mL) 700 mL 02/23/21 9885       Findings: Consistent with preop    Plan  1.) Weight bearing as tolerated  2.) posterior hip precautions for 3 months  3.) Abduction pillow while in bed for 4-6 weeks  4.) DVT proph: Lovenox 40 mg sq daily for 21 days and then aspirin 325 mg po daily for 4 weeks  5.) OK to remove staples in 2 weeks  6.) Follow up in 6 weeks with Dr. Analisa Aguirre  7.) Ancef x 24 hours      Electronically signed by Henrry Brown MD on 2/23/2021 at 11:27 AM

## 2021-02-23 NOTE — ANESTHESIA PROCEDURE NOTES
Spinal Block    Start time: 2/23/2021 10:01 AM  End time: 2/23/2021 10:06 AM  Reason for block: primary anesthetic  Staffing  Performed: resident/CRNA   Anesthesiologist: Alejo Perez MD  Resident/CRNA: CHOCO Valiente CRNA  Preanesthetic Checklist  Completed: patient identified, IV checked, site marked, risks and benefits discussed, surgical consent, monitors and equipment checked, pre-op evaluation, timeout performed, anesthesia consent given, oxygen available and patient being monitored  Spinal Block  Patient position: right lateral decubitus  Prep: ChloraPrep  Patient monitoring: cardiac monitor, continuous pulse ox, continuous capnometry and frequent blood pressure checks  Approach: midline  Location: L3/L4  Provider prep: mask and sterile gloves  Local infiltration: lidocaine  Agent: bupivacaine  Dose: 1.5  Dose: 1.5  Needle  Lot number: [id number]  Expiration date: 12/31/2022  Assessment  Sensory level: T8  Swirl obtained: Yes  CSF: clear  Attempts: 1  Hemodynamics: stable  Additional Notes  Tolerated well, without complication.

## 2021-02-23 NOTE — H&P
Patient is 80year old otherwise healthy female, who had fall and had right hip fracture. Hospital and Office records reviewed  Full note to be dictated. A/P:   1. Right hip fracture  2.. HTN    Plan  Hip Surgery  Pt is acceptable risk for this semi-emergency surgery & cleared for operating intervention from medical standpoint.

## 2021-02-24 ENCOUNTER — APPOINTMENT (OUTPATIENT)
Dept: GENERAL RADIOLOGY | Age: 86
DRG: 522 | End: 2021-02-24
Payer: MEDICARE

## 2021-02-24 LAB
BASOPHILS ABSOLUTE: 0.1 K/CU MM
BASOPHILS RELATIVE PERCENT: 0.7 % (ref 0–1)
DIFFERENTIAL TYPE: ABNORMAL
EOSINOPHILS ABSOLUTE: 0.1 K/CU MM
EOSINOPHILS RELATIVE PERCENT: 0.9 % (ref 0–3)
HCT VFR BLD CALC: 39.3 % (ref 37–47)
HEMOGLOBIN: 11.3 GM/DL (ref 12.5–16)
IMMATURE NEUTROPHIL %: 0.4 % (ref 0–0.43)
LYMPHOCYTES ABSOLUTE: 1.3 K/CU MM
LYMPHOCYTES RELATIVE PERCENT: 14.8 % (ref 24–44)
MCH RBC QN AUTO: 31 PG (ref 27–31)
MCHC RBC AUTO-ENTMCNC: 28.8 % (ref 32–36)
MCV RBC AUTO: 107.7 FL (ref 78–100)
MONOCYTES ABSOLUTE: 0.5 K/CU MM
MONOCYTES RELATIVE PERCENT: 5.3 % (ref 0–4)
NUCLEATED RBC %: 0 %
PDW BLD-RTO: 15.2 % (ref 11.7–14.9)
PLATELET # BLD: 143 K/CU MM (ref 140–440)
PMV BLD AUTO: 12.4 FL (ref 7.5–11.1)
RBC # BLD: 3.65 M/CU MM (ref 4.2–5.4)
SEGMENTED NEUTROPHILS ABSOLUTE COUNT: 6.7 K/CU MM
SEGMENTED NEUTROPHILS RELATIVE PERCENT: 77.9 % (ref 36–66)
TOTAL IMMATURE NEUTOROPHIL: 0.03 K/CU MM
TOTAL NUCLEATED RBC: 0 K/CU MM
WBC # BLD: 8.5 K/CU MM (ref 4–10.5)

## 2021-02-24 PROCEDURE — 36415 COLL VENOUS BLD VENIPUNCTURE: CPT

## 2021-02-24 PROCEDURE — 97530 THERAPEUTIC ACTIVITIES: CPT

## 2021-02-24 PROCEDURE — 94761 N-INVAS EAR/PLS OXIMETRY MLT: CPT

## 2021-02-24 PROCEDURE — 1200000000 HC SEMI PRIVATE

## 2021-02-24 PROCEDURE — 73610 X-RAY EXAM OF ANKLE: CPT

## 2021-02-24 PROCEDURE — 6360000002 HC RX W HCPCS: Performed by: GENERAL PRACTICE

## 2021-02-24 PROCEDURE — 6370000000 HC RX 637 (ALT 250 FOR IP): Performed by: PHYSICIAN ASSISTANT

## 2021-02-24 PROCEDURE — 73590 X-RAY EXAM OF LOWER LEG: CPT

## 2021-02-24 PROCEDURE — 97166 OT EVAL MOD COMPLEX 45 MIN: CPT

## 2021-02-24 PROCEDURE — 85025 COMPLETE CBC W/AUTO DIFF WBC: CPT

## 2021-02-24 PROCEDURE — 2580000003 HC RX 258: Performed by: PHYSICIAN ASSISTANT

## 2021-02-24 PROCEDURE — 6360000002 HC RX W HCPCS: Performed by: PHYSICIAN ASSISTANT

## 2021-02-24 PROCEDURE — 97162 PT EVAL MOD COMPLEX 30 MIN: CPT

## 2021-02-24 RX ADMIN — CLONIDINE HYDROCHLORIDE 0.1 MG: 0.1 TABLET ORAL at 09:43

## 2021-02-24 RX ADMIN — HYDROCODONE BITARTRATE AND ACETAMINOPHEN 1 TABLET: 5; 325 TABLET ORAL at 18:10

## 2021-02-24 RX ADMIN — CLONIDINE HYDROCHLORIDE 0.1 MG: 0.1 TABLET ORAL at 22:02

## 2021-02-24 RX ADMIN — HYDROCODONE BITARTRATE AND ACETAMINOPHEN 1 TABLET: 5; 325 TABLET ORAL at 23:48

## 2021-02-24 RX ADMIN — SODIUM CHLORIDE, PRESERVATIVE FREE 10 ML: 5 INJECTION INTRAVENOUS at 09:44

## 2021-02-24 RX ADMIN — MORPHINE SULFATE 2 MG: 2 INJECTION, SOLUTION INTRAMUSCULAR; INTRAVENOUS at 02:16

## 2021-02-24 RX ADMIN — MORPHINE SULFATE 2 MG: 2 INJECTION, SOLUTION INTRAMUSCULAR; INTRAVENOUS at 05:58

## 2021-02-24 RX ADMIN — SODIUM CHLORIDE, PRESERVATIVE FREE 10 ML: 5 INJECTION INTRAVENOUS at 22:03

## 2021-02-24 RX ADMIN — HYDROCODONE BITARTRATE AND ACETAMINOPHEN 1 TABLET: 5; 325 TABLET ORAL at 06:52

## 2021-02-24 RX ADMIN — ENOXAPARIN SODIUM 40 MG: 40 INJECTION SUBCUTANEOUS at 09:43

## 2021-02-24 RX ADMIN — CEFAZOLIN SODIUM 2000 MG: 10 INJECTION, POWDER, FOR SOLUTION INTRAVENOUS at 02:16

## 2021-02-24 RX ADMIN — DILTIAZEM HYDROCHLORIDE 240 MG: 240 CAPSULE, COATED, EXTENDED RELEASE ORAL at 09:43

## 2021-02-24 RX ADMIN — HYDROCODONE BITARTRATE AND ACETAMINOPHEN 1 TABLET: 5; 325 TABLET ORAL at 12:01

## 2021-02-24 ASSESSMENT — PAIN SCALES - GENERAL
PAINLEVEL_OUTOF10: 4
PAINLEVEL_OUTOF10: 8
PAINLEVEL_OUTOF10: 7
PAINLEVEL_OUTOF10: 6
PAINLEVEL_OUTOF10: 4
PAINLEVEL_OUTOF10: 4

## 2021-02-24 ASSESSMENT — PAIN DESCRIPTION - LOCATION: LOCATION: HIP

## 2021-02-24 ASSESSMENT — PAIN DESCRIPTION - ORIENTATION
ORIENTATION: RIGHT

## 2021-02-24 ASSESSMENT — PAIN DESCRIPTION - ONSET
ONSET: ON-GOING

## 2021-02-24 ASSESSMENT — PAIN DESCRIPTION - FREQUENCY
FREQUENCY: CONTINUOUS

## 2021-02-24 ASSESSMENT — PAIN DESCRIPTION - PROGRESSION
CLINICAL_PROGRESSION: NOT CHANGED
CLINICAL_PROGRESSION: NOT CHANGED

## 2021-02-24 ASSESSMENT — PAIN DESCRIPTION - DESCRIPTORS
DESCRIPTORS: ACHING
DESCRIPTORS: ACHING

## 2021-02-24 ASSESSMENT — PAIN DESCRIPTION - PAIN TYPE
TYPE: ACUTE PAIN
TYPE: ACUTE PAIN

## 2021-02-24 ASSESSMENT — PAIN - FUNCTIONAL ASSESSMENT: PAIN_FUNCTIONAL_ASSESSMENT: PREVENTS OR INTERFERES SOME ACTIVE ACTIVITIES AND ADLS

## 2021-02-24 NOTE — CARE COORDINATION
CM placed a white board for PT/OT to see pt for discharge planning.   CM called Cindy at [facility] and left a VM [address]  1232 CM collaborated with PT/OT and recommendation is for ARU. CM into see pt and her niece. Pt describes her as her dtr, Kenneth Park. Pt lives in the [address] [place] [address] at [place]. CM discussed recommendations. Both agree that they would like ARU as first option and [facility] as 2nd option.    CM called Jossy with ARU and made the referral. [address]

## 2021-02-24 NOTE — PROGRESS NOTES
ORTHOPEDIC POST-OP PROGRESS NOTE    2021    Patient name: Reyes Humphreys  : 1923    SUBJECTIVE   The patient was seen and examined. Reyes Humphreys is POD#1 from right hip cemented hemiarthroplasty. Patient reports pain especially overnight. States that she is feeling slightly better today. Notes pain at right hip along incision and also notes chronic hypersensitivity about left lower leg along tib/fib region and ankle. Denies any numbness/tingling/paresthesias. I discussed hip precautions, plan and follow up. Questions answered. Denies further concerns at this time. OBJECTIVE     Vitals:    21 0945   BP: (!) 189/74   Pulse: 91   Resp: 18   Temp: 97.7 °F (36.5 °C)   SpO2: 99%     I/O last 3 completed shifts: In: 800 [I.V.:800]  Out: 850 [Urine:750; Blood:100]    Physical Exam:   GEN: A&Ox3. NAD. A little drowsy. MS: RLE- SILT; CR <2 sec; wiggles toes and PF/DF ankle; calf soft/NT; hip dressing clean/dry/intact with minimal strike-through on dressing; thigh soft; NV intact. LLE- chronic hypersensitivity below knee; SILT; CR <2 sec; no open wounds; calf soft; wiggles toes; bruising noted medially about ankle.      Labs:   CBC/COAGS  Recent Labs     21  1900 21  0656 21  0633   WBC 5.6 9.4 8.5   HCT 35.7* 39.3 39.3    169 143   INR 0.91  --   --      BMP  Recent Labs     21  1900 21  0656    137   K 4.4 4.8    102   CO2 27 27   BUN 25* 21   CREATININE 1.1 1.1         ASSESSMENT     80 y.o. female, POD#1    PLAN     1.) PT/OT, activity: Weight bearing as tolerated RLE  2.) Posterior hip precautions for 3 months  3.) Abduction pillow while in bed for 4-6 weeks  4.) DVT proph: Lovenox 40 mg sq daily for 21 days and then aspirin 325 mg po daily for 4 weeks  5.) OK to remove staples in 2 weeks  6.) Follow up in 6 weeks with Dr. Clark Perez  7.) Ancef x 24 hours  8.) Patient reports some pain and ecchymosis noted medially about left ankle and tib/fib region with chronic hypersensitivity- will obtain x-rays.  - Discussed with Dr. Krissy Delong.      Electronically signed by:Lyndsey Jackqulyn Lennox, PA-C, 2/24/2021 10:18 AM normal... Scribe Attestation (For Scribes USE Only)... Attending Attestation (For Attendings USE Only).../Scribe Attestation (For Scribes USE Only)...

## 2021-02-24 NOTE — PROGRESS NOTES
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 Subjective:  Patient states: \"I'm a tough old bird! \"  Pain: Pt reported 8/10 surgical pain in Rt hip at end of session  Communication with other providers: PT Luisa Weeks, RN Deysi Leon   Restrictions: General Precautions, Fall Risk, WBAT Rt LE, Posterior Hip Precautions, IV, Pocket Telemetry, Lemon, Bed/chair alarm    Home Setup/Prior level of function:  Social/Functional History  Lives With: Alone  Type of Home: Apartment ([facility])  Home Layout: One level  Home Access: Level entry  Bathroom Shower/Tub: Walk-in shower  Bathroom Toilet: Handicap height  Bathroom Equipment: Shower chair, Grab bars in shower, Grab bars around toilet  Bathroom Accessibility: Accessible  ADL Assistance: Independent  Homemaking Assistance: Independent (with household IADLs, staff assists with transportation to store and grocery shopping)  Homemaking Responsibilities: Yes  Ambulation Assistance: Independent (reports using no AD)  Transfer Assistance: Independent  Active : No  Occupation: Retired  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 Pleasant and agreeable to evaluation.   · Vision: WFL (Glasses)  · Hearing: [facility]  · Vitals: Stable vitals throughout session    Body Systems and functions:  · ROM: WFL all joints in BL UEs observed functionally  · Strength: 4 to 4+/5 MMT all major muscle groups BL UEs  · Sensation: WFL (denies numbness/tingling)  · Tone: Normal  · Coordination: WFL for pt's age  · Perception: WNL    Activities of Daily Living (ADLs):  · Feeding: Independent   · Grooming: SBA (seated facial hygiene; unable to complete in standing 1   [] 2   [] 2   [] 3   [] 3   [x] 4      Raw Score:  15     [24=0% impaired(CH), 23=1-19%(CI), 20-22=20-39%(CJ), 15-19=40-59%(CK), 10-14=60-79%(CL), 7-9=80-99%(CM), 6=100%(CN)]     Treatment:  Therapeutic Activity Training:   Therapeutic activity training was instructed today. Cues were given for safety, sequence, UE/LE placement, awareness, and balance. Activities performed today included bed mobility training, UB/LB dressing tasks, transfer training, HH mobility with RW, education on role of OT, POC, WBAT status Rt LE, Posterior Hip precautions, d/c planning      Safety Measures: Gait belt used, Left in Chair, Alarm in place    Assessment:  Pt is a 80year old female with no past medical history on file. Pt admitted following a fall and diagnosed with a Rt femoral neck fracture. Pt underwent Rt hip hemiarthroplasty on 2-23. Pt lives at [facility] independent living and at baseline is independent with ADLs and mobility. Pt currently presents with the above impairments. Recommend continued OT services in inpatient rehab setting at discharge. Complexity: Moderate  Prognosis: Good  Plan: 4x/week      Goals:  1. Pt will complete all aspects of bed mobility for EOB/OOB ADLs min A  2. Pt will complete UB/LB bathing min A with setup using long handled sponge  3. Pt will complete all aspects of LB dressing mod A with setup using LB AE  4. Pt will complete all functional transfers to and from bed, chair, toilet, shower chair CGA/good safety awareness  5. Pt will ambulate HH distance to bathroom for toileting CGA with RW  6. Pt will complete all aspects of toileting task mod A  7. Pt will complete oral hygiene/grooming routine in standing at sink CGA with setup  8.  Pt will verbalize/be compliant with 3/3 posterior hip precautions 100% of the time      Time:   Time in: 1143  Time out: 1207  Timed treatment minutes: 9  Total time: 24      Electronically signed by:    VAL Ellison/L, [facility], [id number]

## 2021-02-24 NOTE — CONSULTS
demonstrates impaired transfer and gait related to surgery. Patient would benefit from d/c to ARU prior to return back to independent living. Complexity: Moderate  Prognosis: Good, no significant barriers to participation at this time. Plan Times per week: 6+/week, 1 week,      Equipment: RW    Goals:  Short term goals  Time Frame for Short term goals: 1 week  Short term goal 1: Patient will perform supine to sit with min A. Short term goal 2: Patient will perform STS SBA with RW. Short term goal 3: Patient will ambulate x50ft CGA with RW. Treatment plan:  Bed mobility, transfers, balance, gait, TA, TX. Recommendations for NURSING mobility: ambulate as able with RW, use gait belt.     Time:   Time in: 1143  Time out: 1208  Timed treatment minutes: 10  Total time: 25    Electronically signed by:    Reg Jacob, PT  2/24/2021, 2:18 PM

## 2021-02-24 NOTE — PLAN OF CARE
Problem: Pain:  Description: Pain management should include both nonpharmacologic and pharmacologic interventions.   Goal: Pain level will decrease  Description: Pain level will decrease  2/24/2021 1135 by Luz Maria Odonnell RN  Outcome: Ongoing  2/24/2021 0514 by Lattie Rubinstein, RN  Outcome: Ongoing  Goal: Control of acute pain  Description: Control of acute pain  2/24/2021 1135 by LuzM aria Odonnell RN  Outcome: Ongoing  2/24/2021 0514 by Lattie Rubinstein, RN  Outcome: Ongoing  Goal: Control of chronic pain  Description: Control of chronic pain  2/24/2021 1135 by Luz Maria Odonnell RN  Outcome: Ongoing  2/24/2021 0514 by Lattie Rubinstein, RN  Outcome: Ongoing     Problem: Falls - Risk of:  Goal: Will remain free from falls  Description: Will remain free from falls  2/24/2021 1135 by Luz Maria Odonnell RN  Outcome: Ongoing  2/24/2021 0514 by Lattie Rubinstein, RN  Outcome: Ongoing  Goal: Absence of physical injury  Description: Absence of physical injury  2/24/2021 1135 by Luz Maria Odonnell RN  Outcome: Ongoing  2/24/2021 0514 by Lattie Rubinstein, RN  Outcome: Ongoing

## 2021-02-24 NOTE — CONSULTS
RENAL DOSE ADJUSTMENT MADE PER P/T PROTOCOL    PREVIOUS ORDER:  Enoxaparin 40mg subq daily    Estimated Creatinine Clearance: 27 mL/min (based on SCr of 1.1 mg/dL). Recent Labs     02/22/21  1900 02/23/21  0656 02/24/21  0633   BUN 25* 21  --    CREATININE 1.1 1.1  --     169 143   INR 0.91  --   --      NEW RENALLY ADJUSTED ORDER:  ENOXAPARIN 30MG SUBQ DAILY    Hillary Woodson.  Uche Antonio, [facility]  2/24/2021 10:54 AM

## 2021-02-24 NOTE — DISCHARGE INSTR - COC
Continuity of Care Form    Patient Name: Yeison Joy   :  1923  MRN:  [id number]    Admit date:  2021  Discharge date:  ***    Code Status Order: Full Code   Advance Directives:   Advance Care Flowsheet Documentation     Date/Time Healthcare Directive Type of Healthcare Directive Copy in [address] Agent's Name Healthcare Agent's Phone Number    21 [phone]  Other (Comment) pt stated she is not sure --  No, copy requested from medical records -- -- --          Admitting Physician:  Vineet Rojo MD  PCP: Paola Domingo PA-C    Discharging Nurse: [facility] Unit/Room#: 8982/0475-D  Discharging Unit Phone Number: ***    Emergency Contact:   Extended Emergency Contact Information  Primary Emergency Contact: Torres [address] Phone: [phone]  Relation: Other  Preferred language: English   needed? No  Secondary Emergency Contact: AntonioMistiAguila  Address: [facility]/ [address] Phone: [phone]  Relation: Spouse    Past Surgical History:  History reviewed. No pertinent surgical history. Immunization History: There is no immunization history on file for this patient.     Active Problems:  Patient Active Problem List   Diagnosis Code    Hip fracture, unspecified laterality, closed, initial encounter ([facility] [address] S72.009A    Closed fracture of neck of right femur ([facility] [address] S72.001A       Isolation/Infection:   Isolation          No Isolation        Patient Infection Status     Infection Onset Added Last Indicated Last Indicated By Review Planned Expiration Resolved Resolved By    None active    Resolved    COVID-19 Rule Out 21 COVID-19, Rapid (Ordered)   21 Rule-Out Test Resulted          Nurse Assessment:  Last Vital Signs: BP (!) 189/74   Pulse 91   Temp 97.7 °F (36.5 °C) (Oral)   Resp 18   Ht 5' 3\" (1.6 m)   Wt 150 lb (68 kg)   SpO2 99%   BMI 26.57 kg/m²     Last documented pain score (0-10 scale): Pain Level: 8  Last Weight:   Wt Readings from Last 1 Encounters:   02/22/21 150 lb (68 kg)     Mental Status:  {IP PT MENTAL STATUS:20030}    IV Access:  { CIERRA IV ACCESS:289630962}    Nursing Mobility/ADLs:  Walking   {CHP DME PEWX:148476276}  Transfer  {CHP DME ZUMP:527966611}  Bathing  {CHP DME KNTR:360073394}  Dressing  {CHP DME PQWF:687685374}  Toileting  {CHP DME SYXT:635636590}  Feeding  {P DME QMMD:525160099}  Med Admin  {P DME MKML:438832799}  Med Delivery   { CIERRA MED Delivery:011718658}    Wound Care Documentation and Therapy:        Elimination:  Continence:   · Bowel: {YES / IO:77668}  · Bladder: {YES / AP:44746}  Urinary Catheter: {Urinary Catheter:985219938}   Colostomy/Ileostomy/Ileal Conduit: {YES / YQ:33691}       Date of Last BM: ***    Intake/Output Summary (Last 24 hours) at 2/24/2021 1236  Last data filed at 2/24/2021 0943  Gross per 24 hour   Intake 10 ml   Output 750 ml   Net -740 ml     I/O last 3 completed shifts:   In: 12 [I.V.:800]  Out: 850 [Urine:750; Blood:100]    Safety Concerns:     [address] Safety Concerns:514349181}    Impairments/Disabilities:      [address] Impairments/Disabilities:408729446}        Patient's personal belongings (please select all that are sent with patient):  {[facility] DME Belongings:431611804}    RN SIGNATURE:  {Esignature:385103736}    CASE MANAGEMENT/SOCIAL WORK SECTION    Inpatient Status Date: ***    Readmission Risk Assessment Score:  Readmission Risk              Risk of Unplanned Readmission:        10           Discharging to Facility/ Agency   · Name:   · Address:  · Phone:  · Fax:    Dialysis Facility (if applicable)   · Name:  · Address:  · Dialysis Schedule:  · Phone:  · Fax:    / signature: {Esignature:183436742}    PHYSICIAN SECTION    Nutrition Therapy:  Current Nutrition Therapy:   [address]ON Diet List:930013071}    Routes of Feeding: {CHP DME Other Feedings:317141696}  Liquids: {Slp liquid thickness:68058}  Daily Fluid Restriction: {CHP DME Yes amt example:405305654}  Last Modified Barium Swallow with Video (Video Swallowing Test): {Done Not Done PXRX:655982769}    Treatments at the Time of Hospital Discharge:   Respiratory Treatments: ***  Oxygen Therapy:  {Therapy; copd oxygen:51267}  Ventilator:    {[facility] Vent VKRH:844954056}    Rehab Therapies: {THERAPEUTIC INTERVENTION:9120361274}  Weight Bearing Status/Restrictions: {[facility] Weight Bearin}  Other Medical Equipment (for information only, NOT a DME order):  {EQUIPMENT:560742334}  Other Treatments: ***    Prognosis: {Prognosis:6086595071}    Condition at Discharge: [address] [place] [address] Patient Condition:898744356}    Rehab Potential (if transferring to Rehab): {Prognosis:0323855686}    Recommended Labs or Other Treatments After Discharge: ***    Physician Certification: I certify the above information and transfer Skip Stephens  is necessary for the continuing treatment of the diagnosis listed and that she requires {Admit to Appropriate Level of Care:30160} for {GREATER/LESS:564651864} 30 days.      Update Admission H&P: {CHP DME Changes in AMHSF:151844603}    PHYSICIAN SIGNATURE:  {Esignature:931059550}

## 2021-02-24 NOTE — PROGRESS NOTES
INTERNAL MEDICINE PROGRESS NOTE        Lonnie Villatoro   8/23/1923   Primary Care Physician:  Rosalina Quinn PA-C  Admit Date: 2/22/2021     Subjective:   Patient did not sleep well  Last night due to pain, she is little better. Lesle Gulling No chest pain or shortness of breath. No fever, chills. No vomiting or diarrhea.        Objective:   BP (!) 150/68   Pulse 87   Temp 97.5 °F (36.4 °C) (Oral)   Resp 16   Ht 5' 3\" (1.6 m)   Wt 100 lb (45.4 kg)   SpO2 95%   BMI 17.71 kg/m²    General appearance: alert, appears stated age and cooperative  Head: Normocephalic, without obvious abnormality, atraumatic  Neck: no adenopathy and supple, symmetrical, trachea midline  Lungs: clear to auscultation bilaterally  Heart: regular rate and rhythm and S1, S2 normal  Abdomen: soft, non-tender; bowel sounds normal; no masses,  no organomegaly  Extremities: no clubbing, cyanosis or edema  Neurologic: Grossly normal    Data Review  Lab Results   Component Value Date     02/23/2021    K 4.8 02/23/2021     02/23/2021    CO2 27 02/23/2021    CREATININE 1.1 02/23/2021    BUN 21 02/23/2021    CALCIUM 8.6 02/23/2021     Lab Results   Component Value Date    WBC 8.5 02/24/2021    HGB 11.3 (L) 02/24/2021    HCT 39.3 02/24/2021    .7 (H) 02/24/2021     02/24/2021     INR/Prothrombin Time      Meds:    [START ON 2/25/2021] enoxaparin  30 mg Subcutaneous Daily    dilTIAZem  240 mg Oral Daily    cloNIDine  0.1 mg Oral BID    sodium chloride flush  10 mL Intravenous 2 times per day     PRN Meds: HYDROcodone-acetaminophen, sodium chloride flush, promethazine **OR** ondansetron, polyethylene glycol, acetaminophen **OR** acetaminophen    Assessment/Plan:   Patient Active Hospital Problem List:  Patient Active Problem List   Diagnosis    Hip fracture, unspecified laterality, closed, initial encounter ([facility] [address]    Closed fracture of neck of right femur ([facility] [address]   Hypertension        Plan:   POD # 1   Doing well, stop iv fluid   Pt/ot   adequate pain control   Discharge planning   Discussed with nursing staff    Late entry, pt was seen in am.

## 2021-02-24 NOTE — H&P
History & Physical        Reason for admission: Hip Pain     History Obtained From:  Patient, Medical records    HISTORY OF PRESENT ILLNESS:    The patient is a 80 y.o. female who presents after the fall. She was in severe pain and had difficulty ambulation. She was evaluated by ER Physician and had xray hip, it was positive for right hip fracture. Patient stated that her pain is in adequate control. Patient was taken to surgery early this morning. Patient's surgery was uneventful. She is feeling ok, but does not have much appetite and does not want eat at this time. She does not have fever, chills. No shortness of breath. No chest pain. No headache or dizziness. No nausea, vomiting or diarrhea     Past Medical History:    Hypertension  Leg pain  Past Surgical History:    History reviewed. No pertinent surgical history. Medications Prior to Admission:    Medications Prior to Admission: cloNIDine (CATAPRES) 0.1 MG tablet, Take 0.1 mg by mouth 2 times daily  losartan (COZAAR) 50 MG tablet, Take 50 mg by mouth daily  hydroCHLOROthiazide (HYDRODIURIL) 25 MG tablet, Take 12.5 mg by mouth daily   gabapentin (NEURONTIN) 100 MG capsule, Take 100 mg by mouth 2 times daily as needed. aspirin 81 MG EC tablet, Take 81 mg by mouth daily  dilTIAZem (CARDIZEM CD) 240 MG extended release capsule, Take 240 mg by mouth daily    Allergies:  Patient has no known allergies. Social History:   TOBACCO:   has no history on file for tobacco.  ETOH:   has no history on file for alcohol. Patient currently lives alone    Family History:   No family history on file. REVIEW OF SYSTEMS:  CONSTITUTIONAL:  Neg   Recent weight changes,fatigue,fever,chills or night sweats  EYES:  Neg  blurriness,tearing,itching or acute change in vision  EARS:  Neg   hearing loss,tinnitus,vertigo,discharge or earache. NOSE:  Neg  rhinorrhea,sneezing,itching,allergy or epistaxis  MOUTH/THROAT:  Neg  bleeding gums,hoarseness or sore throat.   RESPIRATORY: Neg SOB,wheeze,cough,sputum,hemoptysis or bronochitis  CARDIOVASCULAR   Neg : chest pain,palpitations,dyspnea on exertion,orthopnea,paroxysmal nocturnal dyspnea or edema  GASTROINTESTINAL:  Neg   Appetite changes,nausea,vomiting,or diarrhea,indigestion,dysphagia,change in bowel movements, or abdominal pain. GENITOURINARY:  Neg for  Urinary frequency,hesitancy,urgency,polyuria,dysuria,hematuria,nocturia,or incontinence. HEMATOLOGIC/LYMPHATIC:  Neg  Anemia,bleeding tendency  MUSCULOSKELETAL:  As per HPI   NEUROLOGICAL:  Neg  Loss of Consciousness,memeory loss,forgetfulness,periods of confusion,decline in intellect,nervousness,insomina,aphasia or dysarthria. SKIN :  Neg  skin or hair changes,and has no itching,rashes,sores. PSYCHIATRIC:  Neg depression,personality changes,anxiety. ENDOCRINE:  Neg polydipsia,polyuria,abnormal weight changes,heat /cold intolerance. ALL/IMM :  Neg reactions to drugs other than listed,food,insects,skin rash,trouble breathing,local or general lymph node enlargement or tenderness. PHYSICAL EXAM:  BP (!) 148/67   Pulse 76   Temp 97.4 °F (36.3 °C) (Oral)   Resp 17   Ht 5' 3\" (1.6 m)   Wt 150 lb (68 kg)   SpO2 97%   BMI 26.57 kg/m²   General appearance: alert, appears stated age, cooperative and no distress  Head: Normocephalic, without obvious abnormality, atraumatic  Eyes: conjunctivae/corneas clear. PERRL, EOM's intact.    Ears: normal external  ears  Neck: no adenopathy, no carotid bruit, no JVD, supple, symmetrical, trachea midline and thyroid not enlarged, no tenderness/mass/nodules  Lungs: clear to auscultation bilaterally  Heart: regular rate and rhythm, S1, S2 normal, no murmur, regular rate and rhythm   Abdomen:soft, non-tender; non-distended normal bowel sounds no masses, no organomegaly  Extremities:Pedal edema Trace  Homans sign is negative, no sign of DVT  Skin: Skin color, texture, turgor normal. No rashes or lesions  Neurologic: Alert and oriented X 3,  Mental status: Alert, oriented, thought content appropriate  Cranial nerves:II-XII Grossly intact  Sensory: normal  Motor:generalized weakness  Musculoskeletal: surgical dressing present. Tender movement of the right hip    DATA:  EKG:  I have reviewed EKG with the following interpretation:  Imaging:    CBC with Differential:    Lab Results   Component Value Date    WBC 9.4 02/23/2021    RBC 4.10 02/23/2021    HGB 11.9 02/23/2021    HCT 39.3 02/23/2021     02/23/2021    MCV 95.9 02/23/2021    SEGSPCT 86.3 02/23/2021    LYMPHOPCT 7.9 02/23/2021    DIFFTYPE AUTOMATED DIFFERENTIAL 02/23/2021     BMP:    Lab Results   Component Value Date     02/23/2021    K 4.8 02/23/2021     02/23/2021    CO2 27 02/23/2021    BUN 21 02/23/2021    CREATININE 1.1 02/23/2021    CALCIUM 8.6 02/23/2021    GFRAA 56 02/23/2021    LABGLOM 46 02/23/2021    GLUCOSE 135 02/23/2021     PT/INR:  No results found for: PTINR    Xray hip reviewed. ASSESSMENT / PLAN:     Patient Active Problem List:   Hip fracture, unspecified laterality, closed, initial encounter ([address]   Closed fracture of neck of right femur ([address]  Hypertension    See orders    Patient's appropriate records were reviewed and discussed with er physician  Patient's pain is in adequate control  Monitor condition.  Further management based on patient's progress after surgery  Communicated with surgical team's Physician assistant      Cornell Dutta

## 2021-02-25 PROCEDURE — 6360000002 HC RX W HCPCS: Performed by: PHYSICIAN ASSISTANT

## 2021-02-25 PROCEDURE — 97535 SELF CARE MNGMENT TRAINING: CPT

## 2021-02-25 PROCEDURE — 94761 N-INVAS EAR/PLS OXIMETRY MLT: CPT

## 2021-02-25 PROCEDURE — 2580000003 HC RX 258: Performed by: PHYSICIAN ASSISTANT

## 2021-02-25 PROCEDURE — 6370000000 HC RX 637 (ALT 250 FOR IP): Performed by: PHYSICIAN ASSISTANT

## 2021-02-25 PROCEDURE — 97530 THERAPEUTIC ACTIVITIES: CPT

## 2021-02-25 PROCEDURE — 97116 GAIT TRAINING THERAPY: CPT

## 2021-02-25 PROCEDURE — 1200000000 HC SEMI PRIVATE

## 2021-02-25 PROCEDURE — 97110 THERAPEUTIC EXERCISES: CPT

## 2021-02-25 RX ORDER — OXYCODONE HYDROCHLORIDE AND ACETAMINOPHEN 5; 325 MG/1; MG/1
1 TABLET ORAL EVERY 4 HOURS PRN
Status: DISCONTINUED | OUTPATIENT
Start: 2021-02-25 | End: 2021-02-28 | Stop reason: HOSPADM

## 2021-02-25 RX ORDER — CLONIDINE HYDROCHLORIDE 0.1 MG/1
0.1 TABLET ORAL 3 TIMES DAILY
Status: DISCONTINUED | OUTPATIENT
Start: 2021-02-25 | End: 2021-02-28 | Stop reason: HOSPADM

## 2021-02-25 RX ORDER — GABAPENTIN 100 MG/1
100 CAPSULE ORAL 2 TIMES DAILY
Status: DISCONTINUED | OUTPATIENT
Start: 2021-02-25 | End: 2021-02-28 | Stop reason: HOSPADM

## 2021-02-25 RX ADMIN — OXYCODONE HYDROCHLORIDE AND ACETAMINOPHEN 1 TABLET: 5; 325 TABLET ORAL at 16:31

## 2021-02-25 RX ADMIN — SODIUM CHLORIDE, PRESERVATIVE FREE 10 ML: 5 INJECTION INTRAVENOUS at 10:19

## 2021-02-25 RX ADMIN — GABAPENTIN 100 MG: 100 CAPSULE ORAL at 13:41

## 2021-02-25 RX ADMIN — SODIUM CHLORIDE, PRESERVATIVE FREE 10 ML: 5 INJECTION INTRAVENOUS at 20:41

## 2021-02-25 RX ADMIN — ENOXAPARIN SODIUM 30 MG: 30 INJECTION SUBCUTANEOUS at 09:54

## 2021-02-25 RX ADMIN — GABAPENTIN 100 MG: 100 CAPSULE ORAL at 20:41

## 2021-02-25 RX ADMIN — CLONIDINE HYDROCHLORIDE 0.1 MG: 0.1 TABLET ORAL at 20:41

## 2021-02-25 RX ADMIN — OXYCODONE HYDROCHLORIDE AND ACETAMINOPHEN 1 TABLET: 5; 325 TABLET ORAL at 20:41

## 2021-02-25 RX ADMIN — DILTIAZEM HYDROCHLORIDE 240 MG: 240 CAPSULE, COATED, EXTENDED RELEASE ORAL at 09:54

## 2021-02-25 RX ADMIN — OXYCODONE HYDROCHLORIDE AND ACETAMINOPHEN 1 TABLET: 5; 325 TABLET ORAL at 09:54

## 2021-02-25 RX ADMIN — HYDROCODONE BITARTRATE AND ACETAMINOPHEN 1 TABLET: 5; 325 TABLET ORAL at 06:09

## 2021-02-25 RX ADMIN — CLONIDINE HYDROCHLORIDE 0.1 MG: 0.1 TABLET ORAL at 09:54

## 2021-02-25 RX ADMIN — CLONIDINE HYDROCHLORIDE 0.1 MG: 0.1 TABLET ORAL at 13:41

## 2021-02-25 ASSESSMENT — PAIN SCALES - GENERAL
PAINLEVEL_OUTOF10: 8
PAINLEVEL_OUTOF10: 3
PAINLEVEL_OUTOF10: 6

## 2021-02-25 ASSESSMENT — PAIN DESCRIPTION - ORIENTATION: ORIENTATION: RIGHT

## 2021-02-25 ASSESSMENT — PAIN DESCRIPTION - FREQUENCY: FREQUENCY: CONTINUOUS

## 2021-02-25 NOTE — PROGRESS NOTES
Patient instructed and educated on [employer]. Patient able to do 250 ml. Vital capacity  Patient's goal is 1500ml.  Electronically signed by Rene Piper RCP on 2/25/2021 at 11:25 AM

## 2021-02-25 NOTE — PROGRESS NOTES
Comprehensive Nutrition Assessment    Type and Reason for Visit:  Initial(low BMI)    Nutrition Recommendations/Plan:   Add Boost Pudding and frozen ONS  Encourage po intake as able  Please document po intake  Will monitor po intake, weight trends, poc    Nutrition Assessment:  Pt admitted for closed fracture of neck of right femur, pt currently on genearl diet with standard ONS,  no weight hx available for review, visited pt at bedside, pt reports decreased appetite \"for a while\", pt denies any chewing or swallowing difficulty, reports UBW ~100#, denies any recent unintentional wt loss, pt is reports that she will drink Ensure however doesn't really care for them, pt agreeable to trial frozen ons and Boost Pudding, Pt at high nutrition risk    Malnutrition Assessment:  Malnutrition Status: At risk for malnutrition (Comment)    Context:  Acute Illness       Estimated Daily Nutrient Needs:  Energy (kcal):  8362-8815(93-35 kcal/kg); Weight Used for Energy Requirements:  Current     Protein (g):  64-77(1.4-1.7 g/kg);  Weight Used for Protein Requirements:  Current        Fluid (ml/day):  1500; Method Used for Fluid Requirements:  1 ml/kcal      Nutrition Related Findings:  pt resting in bed at time of visit      Wounds:  Surgical Incision       Current Nutrition Therapies:    DIET GENERAL;  Dietary Nutrition Supplements: Standard High Calorie Oral Supplement    Anthropometric Measures:  · Height: 5' 2.99\" (160 cm)  · Current Body Weight: 100 lb 1.4 oz (45.4 kg)   · Admission Body Weight: (n/a)    · Usual Body Weight: (n/a)     · Ideal Body Weight: 115 lbs; % Ideal Body Weight 87 %   · BMI: 17.7  · BMI Categories: Underweight (BMI less than 22) age over 72       Nutrition Diagnosis:   · Underweight related to inadequate protein-energy intake as evidenced by BMI    Nutrition Interventions:   Food and/or Nutrient Delivery:  Continue Current Diet, Modify Oral Nutrition Supplement  Nutrition Education/Counseling:  No recommendation at this time   Coordination of Nutrition Care:  Continue to monitor while inpatient    Goals:  pt will consume greater than 50% of meals and supplements       Nutrition Monitoring and Evaluation:   Behavioral-Environmental Outcomes:  None Identified   Food/Nutrient Intake Outcomes:  Supplement Intake, Food and Nutrient Intake  Physical Signs/Symptoms Outcomes:  Biochemical Data, Weight, Hemodynamic Status, GI Status, Skin     Discharge Planning:     Too soon to determine     Electronically signed by Stacy Nance MS, RD, LD on 2/25/21 at 4:18 PM EST    Contact: [phone]

## 2021-02-25 NOTE — PROGRESS NOTES
Occupational Therapy      Occupational Therapy Treatment Note    Name: Pa Silva MRN: [id number] :   1923   Date:  2021   Admission Date: 2021 Room:  [address] [place] [address]     Primary Problem: Rt femoral neck fracture s/p Rt hip hemiarthroplasty     Restrictions/Precautions: General Precautions, Fall Risk, WBAT Rt LE, Posterior Hip Precautions, Bed/chair alarm    Communication with other providers: SALOME Rueda    Subjective:  Patient states: \"When do you think I'll get to go to rehab? \"  Pain: Pt denied pain this date at rest    Objective:    Observation: Pt received in supine upon OT arrival. Pleasant and agreeable to treatment. Family member present and supportive. Objective Measures: Orientation WFL    Treatment, including education:  Therapeutic Activity Training:   Therapeutic activity training was instructed today. Cues were given for safety, sequence, UE/LE placement, awareness, and balance. Self Care Training:   Cues were given for safety, sequence, UE/LE placement, visual cues, and balance. Pt received in supine upon OT arrival. Pt re-educated on role of OT, POC, WBAT status Rt LE, and posterior hip precautions. Pt transferred supine to sitting EOB CGA with HOB elevated to 30', increased time/effort, and min cues for use of bed rail. Pt able to sit at EOB level with supervision/good static sitting balance. Pt dependent with donning BL socks seated EOB. Pt completed oral hygiene tasks of brushing/rinsing, facial hygiene, and grooming task of brushing hair seated EOB with supervision/setup. Pt completed sit to stand transfer from bed CGA with min cues for safe hand placement. Upon standing, pt with small episode of posterior LOB requiring min A for postural correction. Pt ambulated approximately 20 ft in room CGA with RW. Pt with continued antalgic gait, step-to pattern used, but overall improved tolerance and no signs of lightheadedness this date.  Pt returned to bed and transferred stand to sit to bed CGA with min cues for reaching back with arms. Pt transferred sitting EOB to supine mod A with assist for lifting LEs into bed. Pt left positioned for comfort in bed with all lines intact, all needs within reach, and bed alarm on. Assessment / Impression:    Patient's tolerance of treatment: Well  Adverse Reaction: None  Significant change in status and impact: Improved overall functional activity tolerance this date, no signs of lightheadedness  Barriers to improvement: None noted      Plan for Next Session:    Continue per OT POC. Continue to recommend inpatient rehab at discharge.        Time in: 1601  Time out: 1632  Timed treatment minutes: 31  Total treatment time: 31      Electronically signed by:    VAL Galloway/L, [place], [id number]

## 2021-02-25 NOTE — PROGRESS NOTES
ORTHOPEDIC POST-OP PROGRESS NOTE    2021    Patient name: Chino Hodge  : 1923    SUBJECTIVE   The patient was seen and examined. Chino Hodge is POD#2 from right hip cemented hemiarthroplasty. Patient is feeling slightly better today. 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 Denies any numbness/tingling/paresthesias. I discussed hip precautions, plan and follow up. Questions answered. Denies further concerns at this time. Discussed recent imaging left leg is negative. OBJECTIVE     Vitals:    21 0815   BP: (!) 175/74   Pulse: 86   Resp: 16   Temp: 98.2 °F (36.8 °C)   SpO2: 93%     I/O last 3 completed shifts: In: 10 [I.V.:10]  Out: 1500 [Urine:1500]    Physical Exam:   GEN: A&Ox3. NAD. A little drowsy. MS: RLE- SILT; CR <2 sec; wiggles toes and PF/DF ankle; calf soft/NT; hip dressing clean/dry/intact with minimal strike-through on dressing; thigh soft; NV intact. LLE- chronic hypersensitivity below knee; SILT; CR <2 sec; no open wounds; calf soft; wiggles toes; bruising noted medially about ankle.      Labs:   CBC/COAGS  Recent Labs     21  1900 21  0656 21  0633   WBC 5.6 9.4 8.5   HCT 35.7* 39.3 39.3    169 143   INR 0.91  --   --      BMP  Recent Labs     21  1900 21  0656    137   K 4.4 4.8    102   CO2 27 27   BUN 25* 21   CREATININE 1.1 1.1         ASSESSMENT     80 y.o. female, POD#2    PLAN     1.) PT/OT, activity: Weight bearing as tolerated RLE  2.) Posterior hip precautions for 3 months  3.) Abduction pillow while in bed for 4-6 weeks  4.) DVT proph: Lovenox 40 mg sq daily for 21 days and then aspirin 325 mg po daily for 4 weeks  5.) OK to remove staples in 2 weeks  6.) Follow up in 6 weeks with Dr. Rocky Ibarra  7.) Ancef x 24 hours - complete  8.) Patient reports some pain and ecchymosis noted medially about left ankle and tib/fib region with chronic hypersensitivity - stable x-rays    Electronically signed by:Bonnie Galindo PA-C, 2/25/2021 12:12 PM

## 2021-02-25 NOTE — PROGRESS NOTES
Physical Therapy    Physical Therapy Treatment Note  Name: Rudy Stephens MRN: [id number] :   1923   Date:  2021   Admission Date: 2021 Room:  [address] [place] [address]   Restrictions/Precautions:        right hip cemented, WBAT, posterior hip precautions hemiarthroplasty  Communication with other providers:  Per nurse ok to tx and notified pt dizzy when first standing. Nurse also notified pt reports she had had some \"black outs\" at home prior to fall. Subjective:  Patient states:  Agreeable to tx. Pt reports \"had a bad night\"  Pain:   Location, Type, Intensity (0/10 to 10/10):  5 at end of tx  Objective:    Observation:  Alert and oriented  Treatment, including education/measures:  Sup to sit with max assist using leg  and assist to move right leg out of bed with HOB up and using pad to scoot to EOB. Sit<=>stand min assist and cues for safety from bed, chair. Pt needing max assist form commode. Pt was dizzy with first stand at walker needing to sit back down. Pt reports this cleared and was then able to transfer to standing at walker without feeling dizzy. amb with rw 10' x 2 min assist and cues. Ex in sup and sitting:  10 reps aps  10 reps quad sets  10 reps glut sets  10 reps x 2 laqs  Safety  Patient left safely in the chair, with call light/phone in reach with alarm applied. Gait belt and mask were used for transfers and gait. Assessment / Impression:       Patient's tolerance of treatment:  good   Adverse Reaction: na  Significant change in status and impact:  na  Barriers to improvement:  Strength and safety  Plan for Next Session:    Cont.  POC  Time in:  1130  Time out:  1210  Timed treatment minutes:  40  Total treatment time:  40    Previously filed items:  Social/Functional History  Lives With: Alone  Type of Home: Apartment([facility])  Home Layout: One level  Home Access: Level entry  Bathroom Shower/Tub: Walk-in shower  Bathroom Toilet: Handicap height  Bathroom Equipment: Shower chair, Grab bars in shower, Grab bars around toilet  Bathroom Accessibility: Accessible  ADL Assistance: [address]: Independent(with household IADLs, staff assists with transportation to store and grocery shopping)  Homemaking Responsibilities: Yes  Ambulation Assistance: Independent(reports using no AD)  Transfer Assistance: Independent  Active : No  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 Short term goal 2: Patient will perform STS SBA with RW. Short term goal 3: Patient will ambulate x50ft CGA with RW.        Electronically signed by:    Dawson Low PTA  2/25/2021, 8:19 AM

## 2021-02-25 NOTE — PLAN OF CARE
Problem: Pain:  Goal: Pain level will decrease  Description: Pain level will decrease  2/24/2021 2356 by Ilana Yoder RN  Outcome: Ongoing  2/24/2021 1135 by Mar Anderson RN  Outcome: Ongoing  Goal: Control of acute pain  Description: Control of acute pain  2/24/2021 2356 by Ilana Yoder RN  Outcome: Ongoing  2/24/2021 1135 by Mar Anderson RN  Outcome: Ongoing  Goal: Control of chronic pain  Description: Control of chronic pain  2/24/2021 2356 by Ilana Yoder RN  Outcome: Ongoing  2/24/2021 1135 by Mar Anderson RN  Outcome: Ongoing     Problem: Falls - Risk of:  Goal: Will remain free from falls  Description: Will remain free from falls  2/24/2021 2356 by Ilana Yoder RN  Outcome: Ongoing  2/24/2021 1135 by Mar Anderson RN  Outcome: Ongoing  Goal: Absence of physical injury  Description: Absence of physical injury  2/24/2021 2356 by Ilana Yoder RN  Outcome: Ongoing  2/24/2021 1135 by Mar Anderson RN  Outcome: Ongoing

## 2021-02-25 NOTE — PROGRESS NOTES
INTERNAL MEDICINE PROGRESS NOTE        Chino Hodge   8/23/1923   Primary Care Physician:  Gareth Hoffman PA-C  Admit Date: 2/22/2021     Subjective:   Patient doing ok this AM. She reports pain controlled with pain med but pain med does not last long enough. No fever, chills. No vomiting or diarrhea. Objective:   BP (!) 175/74   Pulse 86   Temp 98.2 °F (36.8 °C) (Oral)   Resp 16   Ht 5' 3\" (1.6 m)   Wt 100 lb (45.4 kg)   SpO2 93%   BMI 17.71 kg/m²    General appearance: alert, appears stated age and cooperative  Head: Normocephalic, without obvious abnormality, atraumatic  Neck: no adenopathy and supple, symmetrical, trachea midline  Lungs: clear to auscultation bilaterally  Heart: regular rate and rhythm and S1, S2 normal  Abdomen: soft, non-tender; bowel sounds normal; no masses,  no organomegaly  Extremities: no clubbing, cyanosis or edema  Neurologic: Grossly normal    Data Review  Lab Results   Component Value Date     02/23/2021    K 4.8 02/23/2021     02/23/2021    CO2 27 02/23/2021    CREATININE 1.1 02/23/2021    BUN 21 02/23/2021    CALCIUM 8.6 02/23/2021     Lab Results   Component Value Date    WBC 8.5 02/24/2021    HGB 11.3 (L) 02/24/2021    HCT 39.3 02/24/2021    .7 (H) 02/24/2021     02/24/2021     INR/Prothrombin Time      Meds:    cloNIDine  0.1 mg Oral TID    enoxaparin  30 mg Subcutaneous Daily    dilTIAZem  240 mg Oral Daily    sodium chloride flush  10 mL Intravenous 2 times per day     PRN Meds: metoprolol tartrate, HYDROcodone-acetaminophen, sodium chloride flush, promethazine **OR** ondansetron, polyethylene glycol, acetaminophen **OR** acetaminophen    Assessment/Plan:   Patient Active Hospital Problem List:  Patient Active Problem List   Diagnosis    Hip fracture, unspecified laterality, closed, initial encounter ([facility]    [facility]   Assessment:  S/p Right hemiarthroplasty (2/23/21): PT/OT. Pain control. Looking for placement. Right hip fx: s/p surgery. Control pain. Hypertension: adjust meds. Monitor. Left leg pain: xray negative. Restart gabapentin        Plan:   POD # 2   Adjust BP meds. Pt/ot   Adjust pain meds. Discharge planning. Possible ARU. Discussed with nursing staff    Electronically signed by Irlanda Hernandez PA-C on 2/25/2021 at 8:42 AM   I have independently evaluated and examined this patient today. I have reviewed radiologic and biochemical tests on this patient. Management Plan is developed mutually with EMY Marroquin. I have reviewed above note and agree with assessment and plan.

## 2021-02-25 NOTE — CARE COORDINATION
CM spoke with Heidi Mauricio.  ARU continues to follow [facility]

## 2021-02-26 LAB
ANION GAP SERPL CALCULATED.3IONS-SCNC: 7 MMOL/L (ref 4–16)
BASOPHILS ABSOLUTE: 0.1 K/CU MM
BASOPHILS RELATIVE PERCENT: 0.8 % (ref 0–1)
BUN BLDV-MCNC: 21 MG/DL (ref 6–23)
CALCIUM SERPL-MCNC: 8.3 MG/DL (ref 8.3–10.6)
CHLORIDE BLD-SCNC: 101 MMOL/L (ref 99–110)
CO2: 27 MMOL/L (ref 21–32)
CREAT SERPL-MCNC: 1 MG/DL (ref 0.6–1.1)
DIFFERENTIAL TYPE: ABNORMAL
EOSINOPHILS ABSOLUTE: 0.2 K/CU MM
EOSINOPHILS RELATIVE PERCENT: 2.6 % (ref 0–3)
GFR AFRICAN AMERICAN: >60 ML/MIN/1.73M2
GFR NON-AFRICAN AMERICAN: 51 ML/MIN/1.73M2
GLUCOSE BLD-MCNC: 89 MG/DL (ref 70–99)
HCT VFR BLD CALC: 34.1 % (ref 37–47)
HEMOGLOBIN: 10.4 GM/DL (ref 12.5–16)
IMMATURE NEUTROPHIL %: 0.5 % (ref 0–0.43)
LYMPHOCYTES ABSOLUTE: 1.4 K/CU MM
LYMPHOCYTES RELATIVE PERCENT: 22.7 % (ref 24–44)
MCH RBC QN AUTO: 29.5 PG (ref 27–31)
MCHC RBC AUTO-ENTMCNC: 30.5 % (ref 32–36)
MCV RBC AUTO: 96.9 FL (ref 78–100)
MONOCYTES ABSOLUTE: 0.5 K/CU MM
MONOCYTES RELATIVE PERCENT: 7.8 % (ref 0–4)
NUCLEATED RBC %: 0.6 %
PDW BLD-RTO: 15.2 % (ref 11.7–14.9)
PLATELET # BLD: 152 K/CU MM (ref 140–440)
PMV BLD AUTO: 11.6 FL (ref 7.5–11.1)
POTASSIUM SERPL-SCNC: 4.8 MMOL/L (ref 3.5–5.1)
RBC # BLD: 3.52 M/CU MM (ref 4.2–5.4)
SEGMENTED NEUTROPHILS ABSOLUTE COUNT: 4.1 K/CU MM
SEGMENTED NEUTROPHILS RELATIVE PERCENT: 65.6 % (ref 36–66)
SODIUM BLD-SCNC: 135 MMOL/L (ref 135–145)
TOTAL IMMATURE NEUTOROPHIL: 0.03 K/CU MM
TOTAL NUCLEATED RBC: 0 K/CU MM
WBC # BLD: 6.3 K/CU MM (ref 4–10.5)

## 2021-02-26 PROCEDURE — 1200000000 HC SEMI PRIVATE

## 2021-02-26 PROCEDURE — 85025 COMPLETE CBC W/AUTO DIFF WBC: CPT

## 2021-02-26 PROCEDURE — 2580000003 HC RX 258: Performed by: PHYSICIAN ASSISTANT

## 2021-02-26 PROCEDURE — 6370000000 HC RX 637 (ALT 250 FOR IP): Performed by: PHYSICIAN ASSISTANT

## 2021-02-26 PROCEDURE — 97110 THERAPEUTIC EXERCISES: CPT

## 2021-02-26 PROCEDURE — 36415 COLL VENOUS BLD VENIPUNCTURE: CPT

## 2021-02-26 PROCEDURE — 97116 GAIT TRAINING THERAPY: CPT

## 2021-02-26 PROCEDURE — 97530 THERAPEUTIC ACTIVITIES: CPT

## 2021-02-26 PROCEDURE — 80048 BASIC METABOLIC PNL TOTAL CA: CPT

## 2021-02-26 PROCEDURE — 94761 N-INVAS EAR/PLS OXIMETRY MLT: CPT

## 2021-02-26 PROCEDURE — 6360000002 HC RX W HCPCS: Performed by: PHYSICIAN ASSISTANT

## 2021-02-26 RX ADMIN — DILTIAZEM HYDROCHLORIDE 240 MG: 240 CAPSULE, COATED, EXTENDED RELEASE ORAL at 09:44

## 2021-02-26 RX ADMIN — CLONIDINE HYDROCHLORIDE 0.1 MG: 0.1 TABLET ORAL at 13:48

## 2021-02-26 RX ADMIN — CLONIDINE HYDROCHLORIDE 0.1 MG: 0.1 TABLET ORAL at 21:13

## 2021-02-26 RX ADMIN — ONDANSETRON 4 MG: 2 INJECTION INTRAMUSCULAR; INTRAVENOUS at 21:13

## 2021-02-26 RX ADMIN — GABAPENTIN 100 MG: 100 CAPSULE ORAL at 21:13

## 2021-02-26 RX ADMIN — CLONIDINE HYDROCHLORIDE 0.1 MG: 0.1 TABLET ORAL at 09:42

## 2021-02-26 RX ADMIN — OXYCODONE HYDROCHLORIDE AND ACETAMINOPHEN 1 TABLET: 5; 325 TABLET ORAL at 21:13

## 2021-02-26 RX ADMIN — OXYCODONE HYDROCHLORIDE AND ACETAMINOPHEN 1 TABLET: 5; 325 TABLET ORAL at 06:08

## 2021-02-26 RX ADMIN — SODIUM CHLORIDE, PRESERVATIVE FREE 10 ML: 5 INJECTION INTRAVENOUS at 21:15

## 2021-02-26 RX ADMIN — SODIUM CHLORIDE, PRESERVATIVE FREE 10 ML: 5 INJECTION INTRAVENOUS at 09:47

## 2021-02-26 RX ADMIN — ENOXAPARIN SODIUM 30 MG: 30 INJECTION SUBCUTANEOUS at 09:48

## 2021-02-26 ASSESSMENT — PAIN SCALES - GENERAL: PAINLEVEL_OUTOF10: 0

## 2021-02-26 NOTE — CARE COORDINATION
CM received VM from [employer] in [facility]. Pt has been denied and Physician may do an expedited appeal. [address]   CM returned call to [employer] to discuss. [address]  0950 CM received letter of Appeal paperwork and placed a PS to Ricci QUEVEDO to see is still in hospital to sign. 9992 CM called and spoke with Dr Halima Downing. CM faxed letter to Dr Ivan Hendrix office [phone]. CM requested fax back.  [address]

## 2021-02-26 NOTE — CARE COORDINATION
Met with patient and niece at bedside regarding pre-cert process and discussed ARU. Explained to patient the required 3 hours of therapy a day. Also explained the average length of stay is 11 days, could be longer or shorter depending on recommendations of therapy and Dr. Erik Barreto. Patient expresses her understanding and states she's agreeable to admit to ARU. Patients goal is to return home to [place] at [facility]. Patient was denied by [facility] Medicare for ARU. MD wishes to pursue expedited appeal.  Expedited appeal pending at this time. Will continue to follow for determination. 1559 CRISTAL received call from CHINYERE at [facility]. CRISTAL updated on Plan. CRISTAL informed her that if she is denied for expedited appeal to call Jose. She will need a precert.  [address]

## 2021-02-26 NOTE — PROGRESS NOTES
Physical Therapy    Physical Therapy Treatment Note  Name: Caprice Menon MRN: [id number] :   1923   Date:  2021   Admission Date: 2021 Room:  [address] [place], [place] [address]-   Restrictions/Precautions:        right hip cemented, WBAT, posterior hip precautions hemiarthroplasty  Communication with other providers:  Student nurse in room during am and pm tx. Nurse notified pt's BP and c/o feeling dizzy when first up. Pt seen for 2 short tx to see if she did better after being up in chair for awhile. Subjective:  Patient states:  Agreeable to tx. Pt reports bottom very sore and tired from laying bed  Pain:   Location, Type, Intensity (0/10 to 10/10): Pt reports hip is sore but did not c/o pain and denies need for pain meds in pm.  Objective:    Observation:  Alert and oriented  Treatment, including education/measures:  Pt given GOPI booklet and reviewed hip precautions  Ex in sup and sitting:  10 reps aps  10 reps quad sets  10 reps glut sets  10 reps laqs   Pt c/o feeling light headed and dizzy when first up. AM: Per nurse student pt's BP was 102/58 in sup earlier in am, sitting /77, standing /67. PM: /60 and doing much better with standing and gt. Sup to sit mod assist and cues with HOB up and using bed rail. Sit<=>stand min assist and cues. amb with rw 5 steps x 1 in am and c/o feeling very light headed and dizzy. amb with rw in pm and did well amb 10' with cga and cues followed with chair. Safety  Patient left safely in the chair, with call light/phone in reach with alarm applied. Gait belt and mask were used for transfers and gait. Assessment / Impression:       Patient's tolerance of treatment:  good  Adverse Reaction: pt diizzy when first up  Significant change in status and impact:  na  Barriers to improvement:  Strength and safety  Plan for Next Session:    Cont.  POC  Time am:  1130 to 1200  Time pm:  1400 [id number]  Timed treatment minutes:  30+15  Total treatment time:

## 2021-02-26 NOTE — PROGRESS NOTES
INTERNAL MEDICINE PROGRESS NOTE        Aziza Ards   8/23/1923   Primary Care Physician:  Cherylene Pila, PA-C  Admit Date: 2/22/2021     Subjective:   Patient awake, doing okay. She reports that pain is better controlled since we have adjusted medications. She reports she slept well last night.     Objective:   BP (!) 102/58   Pulse 64   Temp 97.4 °F (36.3 °C) (Oral)   Resp 16   Ht 5' 2.99\" (1.6 m)   Wt 100 lb (45.4 kg)   SpO2 96%   BMI 17.72 kg/m²    General appearance: alert, appears stated age and cooperative  Head: Normocephalic, without obvious abnormality, atraumatic  Neck: no adenopathy and supple, symmetrical, trachea midline  Lungs: clear to auscultation bilaterally  Heart: regular rate and rhythm and S1, S2 normal  Abdomen: soft, non-tender; bowel sounds normal; no masses,  no organomegaly  Extremities: no clubbing, cyanosis or edema  Neurologic: Grossly normal    Data Review  Lab Results   Component Value Date     02/23/2021    K 4.8 02/23/2021     02/23/2021    CO2 27 02/23/2021    CREATININE 1.1 02/23/2021    BUN 21 02/23/2021    CALCIUM 8.6 02/23/2021     Lab Results   Component Value Date    WBC 6.3 02/26/2021    HGB 10.4 (L) 02/26/2021    HCT 34.1 (L) 02/26/2021    MCV 96.9 02/26/2021     02/26/2021     INR/Prothrombin Time      Meds:    cloNIDine  0.1 mg Oral TID    gabapentin  100 mg Oral BID    enoxaparin  30 mg Subcutaneous Daily    dilTIAZem  240 mg Oral Daily    sodium chloride flush  10 mL Intravenous 2 times per day     PRN Meds: metoprolol tartrate, oxyCODONE-acetaminophen, sodium chloride flush, promethazine **OR** ondansetron, polyethylene glycol, acetaminophen **OR** acetaminophen    Assessment/Plan:   Patient Active Hospital Problem List:  Patient Active Problem List   Diagnosis    Hip fracture, unspecified laterality, closed, initial encounter ([facility]    Closed fracture of neck of right femur ([facility]     Assessment:  S/p Right hemiarthroplasty (2/23/21): PT/OT. Pain control. Looking for placement. Possible ARU. Right hip fx: s/p surgery. Control pain. Hypertension: adjust meds. Monitor. Left leg pain: xray negative. Restart gabapentin. Plan:   POD # 3   Blood pressure better controlled. Pain better controlled. Continue PT/OT. Discharge planning. Referral sent for ARU. Electronically signed by Jabari Johnson PA-C on 2/26/2021 at 8:34 AM   I have independently evaluated and examined this patient today. I have reviewed radiologic and biochemical tests on this patient. Management Plan is developed mutually with EMY Miller. I have reviewed above note and agree with assessment and plan.  Discussed with case management

## 2021-02-27 LAB
ANION GAP SERPL CALCULATED.3IONS-SCNC: 5 MMOL/L (ref 4–16)
ANION GAP SERPL CALCULATED.3IONS-SCNC: 8 MMOL/L (ref 4–16)
BASOPHILS ABSOLUTE: 0 K/CU MM
BASOPHILS RELATIVE PERCENT: 0.8 % (ref 0–1)
BUN BLDV-MCNC: 26 MG/DL (ref 6–23)
BUN BLDV-MCNC: 26 MG/DL (ref 6–23)
CALCIUM SERPL-MCNC: 8.1 MG/DL (ref 8.3–10.6)
CALCIUM SERPL-MCNC: 8.3 MG/DL (ref 8.3–10.6)
CHLORIDE BLD-SCNC: 100 MMOL/L (ref 99–110)
CHLORIDE BLD-SCNC: 98 MMOL/L (ref 99–110)
CO2: 27 MMOL/L (ref 21–32)
CO2: 28 MMOL/L (ref 21–32)
CREAT SERPL-MCNC: 1 MG/DL (ref 0.6–1.1)
CREAT SERPL-MCNC: 1.1 MG/DL (ref 0.6–1.1)
DIFFERENTIAL TYPE: ABNORMAL
EOSINOPHILS ABSOLUTE: 0.1 K/CU MM
EOSINOPHILS RELATIVE PERCENT: 1.9 % (ref 0–3)
GFR AFRICAN AMERICAN: 56 ML/MIN/1.73M2
GFR AFRICAN AMERICAN: >60 ML/MIN/1.73M2
GFR NON-AFRICAN AMERICAN: 46 ML/MIN/1.73M2
GFR NON-AFRICAN AMERICAN: 51 ML/MIN/1.73M2
GLUCOSE BLD-MCNC: 106 MG/DL (ref 70–99)
GLUCOSE BLD-MCNC: 98 MG/DL (ref 70–99)
HCT VFR BLD CALC: 31.7 % (ref 37–47)
HEMOGLOBIN: 9.9 GM/DL (ref 12.5–16)
IMMATURE NEUTROPHIL %: 0.6 % (ref 0–0.43)
LYMPHOCYTES ABSOLUTE: 1 K/CU MM
LYMPHOCYTES RELATIVE PERCENT: 18.5 % (ref 24–44)
MCH RBC QN AUTO: 29.9 PG (ref 27–31)
MCHC RBC AUTO-ENTMCNC: 31.2 % (ref 32–36)
MCV RBC AUTO: 95.8 FL (ref 78–100)
MONOCYTES ABSOLUTE: 0.3 K/CU MM
MONOCYTES RELATIVE PERCENT: 6.4 % (ref 0–4)
NUCLEATED RBC %: 0 %
PDW BLD-RTO: 15.2 % (ref 11.7–14.9)
PLATELET # BLD: 174 K/CU MM (ref 140–440)
PMV BLD AUTO: 10.9 FL (ref 7.5–11.1)
POTASSIUM SERPL-SCNC: 5.3 MMOL/L (ref 3.5–5.1)
POTASSIUM SERPL-SCNC: 5.6 MMOL/L (ref 3.5–5.1)
RBC # BLD: 3.31 M/CU MM (ref 4.2–5.4)
SEGMENTED NEUTROPHILS ABSOLUTE COUNT: 3.7 K/CU MM
SEGMENTED NEUTROPHILS RELATIVE PERCENT: 71.8 % (ref 36–66)
SODIUM BLD-SCNC: 133 MMOL/L (ref 135–145)
SODIUM BLD-SCNC: 133 MMOL/L (ref 135–145)
TOTAL IMMATURE NEUTOROPHIL: 0.03 K/CU MM
TOTAL NUCLEATED RBC: 0 K/CU MM
WBC # BLD: 5.2 K/CU MM (ref 4–10.5)

## 2021-02-27 PROCEDURE — 6360000002 HC RX W HCPCS: Performed by: PHYSICIAN ASSISTANT

## 2021-02-27 PROCEDURE — 1200000000 HC SEMI PRIVATE

## 2021-02-27 PROCEDURE — 80048 BASIC METABOLIC PNL TOTAL CA: CPT

## 2021-02-27 PROCEDURE — 85025 COMPLETE CBC W/AUTO DIFF WBC: CPT

## 2021-02-27 PROCEDURE — 6370000000 HC RX 637 (ALT 250 FOR IP): Performed by: PHYSICIAN ASSISTANT

## 2021-02-27 PROCEDURE — 36415 COLL VENOUS BLD VENIPUNCTURE: CPT

## 2021-02-27 PROCEDURE — 97116 GAIT TRAINING THERAPY: CPT

## 2021-02-27 PROCEDURE — 2580000003 HC RX 258: Performed by: PHYSICIAN ASSISTANT

## 2021-02-27 PROCEDURE — 97110 THERAPEUTIC EXERCISES: CPT

## 2021-02-27 RX ADMIN — GABAPENTIN 100 MG: 100 CAPSULE ORAL at 09:22

## 2021-02-27 RX ADMIN — SODIUM CHLORIDE, PRESERVATIVE FREE 10 ML: 5 INJECTION INTRAVENOUS at 09:23

## 2021-02-27 RX ADMIN — GABAPENTIN 100 MG: 100 CAPSULE ORAL at 20:38

## 2021-02-27 RX ADMIN — OXYCODONE HYDROCHLORIDE AND ACETAMINOPHEN 1 TABLET: 5; 325 TABLET ORAL at 20:42

## 2021-02-27 RX ADMIN — SODIUM CHLORIDE, PRESERVATIVE FREE 10 ML: 5 INJECTION INTRAVENOUS at 20:38

## 2021-02-27 RX ADMIN — DILTIAZEM HYDROCHLORIDE 240 MG: 240 CAPSULE, COATED, EXTENDED RELEASE ORAL at 09:22

## 2021-02-27 RX ADMIN — CLONIDINE HYDROCHLORIDE 0.1 MG: 0.1 TABLET ORAL at 09:44

## 2021-02-27 RX ADMIN — ENOXAPARIN SODIUM 30 MG: 30 INJECTION SUBCUTANEOUS at 09:22

## 2021-02-27 ASSESSMENT — PAIN DESCRIPTION - PAIN TYPE: TYPE: ACUTE PAIN

## 2021-02-27 ASSESSMENT — PAIN DESCRIPTION - ORIENTATION: ORIENTATION: RIGHT

## 2021-02-27 ASSESSMENT — PAIN DESCRIPTION - LOCATION: LOCATION: COCCYX;GROIN;LEG

## 2021-02-27 ASSESSMENT — PAIN SCALES - GENERAL
PAINLEVEL_OUTOF10: 8
PAINLEVEL_OUTOF10: 4
PAINLEVEL_OUTOF10: 8

## 2021-02-27 NOTE — CARE COORDINATION
Received notification that appeal was successful; can be discharged to ARU tomorrow. Dr Gillian Diaz notified by PS of appeal overturn and plan for discharge tomorrow.  Zi Wan RN

## 2021-02-27 NOTE — CARE COORDINATION
Received call from [facility] and was notified that she has been approved for ARU for 7 initial days, beginning 2/27/21. Margaree Kussmaul, CM was notified.

## 2021-02-27 NOTE — PROGRESS NOTES
Physical Therapy    Physical Therapy Treatment Note  Name: Jeffrey Rao MRN: [id number] :   1923   Date:  2021   Admission Date: 2021 Room:  [address] [place] [address]   Restrictions/Precautions:  Right hip cemented, WBAT, posterior hip precautions hemiarthroplasty  Communication with other providers:  Ok to see per nurse  Subjective:  Patient states:  Pt states that her hip does not hurt but her tailbone is uncomfortable. Pain:   Location, Type, Intensity (0/10 to 10/10): 0/10  Objective:    Observation: Pt sitting up in chair upon arrival. Pt agreeable to therapy but did not remember hip precautions from previous tx. Alert and oriented  Treatment, including education/measures:  Reviewed hip precautions with verbal/tactile cues and demonstration  Ex in sitting:  LAQ x 10 ea  Hip flex not past 90* x 10 ea  Glut Set 10x  HR/TR x 10 ea  Hip abduction with adduction back to neutral not crossing midline x 10 reps    Sit to Supine Mod A with cues for hand placement safety and sequencing  Sit<=>stand min assist and cues for hand placement, safety and sequencing  Amb with RW with CGA 25 ft x 2 with slow mj and short step length  Safety  Patient left safely in the bed, with call light/phone in reach with alarm applied. Gait belt and mask were used for transfers and gait. Assessment / Impression:  Improved tolerance to activity but continues to fatigue easily     Patient's tolerance of treatment:  good  Adverse Reaction: none  Significant change in status and impact:  none  Barriers to improvement:  Strength and safety  Plan for Next Session:    Cont.  POC  Time am:  1450  Time pm:  1520  Timed treatment minutes: 30  Total treatment time:  30    Previously filed items:  Social/Functional History  Lives With: Alone  Type of Home: [place]  Home Layout: One level  Home Access: Level entry  Bathroom Shower/Tub: Walk-in shower  Bathroom Toilet: Handicap height  Bathroom Equipment: Shower chair, Grab bars in shower, Grab bars around toilet  Bathroom Accessibility: Accessible  ADL Assistance: [address]: Independent(with household IADLs, staff assists with transportation to store and grocery shopping)  Homemaking Responsibilities: Yes  Ambulation Assistance: Independent(reports using no AD)  Transfer Assistance: Independent  Active : No  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 Short term goal 2: Patient will perform STS SBA with RW. Short term goal 3: Patient will ambulate x50ft CGA with RW.        Electronically signed by:    Jacqueline Gilliam PTA  2/27/2021, 2:57 PM

## 2021-02-28 ENCOUNTER — HOSPITAL ENCOUNTER (INPATIENT)
Age: 86
LOS: 13 days | Discharge: HOME HEALTH | DRG: 561 | End: 2021-03-13
Attending: PHYSICAL MEDICINE & REHABILITATION | Admitting: PHYSICAL MEDICINE & REHABILITATION
Payer: MEDICARE

## 2021-02-28 ENCOUNTER — APPOINTMENT (OUTPATIENT)
Dept: GENERAL RADIOLOGY | Age: 86
DRG: 561 | End: 2021-02-28
Attending: PHYSICAL MEDICINE & REHABILITATION
Payer: MEDICARE

## 2021-02-28 VITALS
HEIGHT: 63 IN | DIASTOLIC BLOOD PRESSURE: 65 MMHG | WEIGHT: 112.3 LBS | BODY MASS INDEX: 19.9 KG/M2 | OXYGEN SATURATION: 96 % | RESPIRATION RATE: 16 BRPM | HEART RATE: 73 BPM | SYSTOLIC BLOOD PRESSURE: 137 MMHG | TEMPERATURE: 98.3 F

## 2021-02-28 DIAGNOSIS — S72.001A CLOSED DISPLACED FRACTURE OF RIGHT FEMORAL NECK (HCC): Primary | ICD-10-CM

## 2021-02-28 LAB
ANION GAP SERPL CALCULATED.3IONS-SCNC: 6 MMOL/L (ref 4–16)
BUN BLDV-MCNC: 25 MG/DL (ref 6–23)
CALCIUM SERPL-MCNC: 8.2 MG/DL (ref 8.3–10.6)
CHLORIDE BLD-SCNC: 98 MMOL/L (ref 99–110)
CO2: 29 MMOL/L (ref 21–32)
CREAT SERPL-MCNC: 1 MG/DL (ref 0.6–1.1)
GFR AFRICAN AMERICAN: >60 ML/MIN/1.73M2
GFR NON-AFRICAN AMERICAN: 51 ML/MIN/1.73M2
GLUCOSE BLD-MCNC: 130 MG/DL (ref 70–99)
POTASSIUM SERPL-SCNC: 4.7 MMOL/L (ref 3.5–5.1)
SODIUM BLD-SCNC: 133 MMOL/L (ref 135–145)

## 2021-02-28 PROCEDURE — 94761 N-INVAS EAR/PLS OXIMETRY MLT: CPT

## 2021-02-28 PROCEDURE — 80048 BASIC METABOLIC PNL TOTAL CA: CPT

## 2021-02-28 PROCEDURE — 6370000000 HC RX 637 (ALT 250 FOR IP): Performed by: INTERNAL MEDICINE

## 2021-02-28 PROCEDURE — 6360000002 HC RX W HCPCS: Performed by: PHYSICIAN ASSISTANT

## 2021-02-28 PROCEDURE — 36415 COLL VENOUS BLD VENIPUNCTURE: CPT

## 2021-02-28 PROCEDURE — 74018 RADEX ABDOMEN 1 VIEW: CPT

## 2021-02-28 PROCEDURE — 6370000000 HC RX 637 (ALT 250 FOR IP): Performed by: PHYSICIAN ASSISTANT

## 2021-02-28 PROCEDURE — 6370000000 HC RX 637 (ALT 250 FOR IP): Performed by: PHYSICAL MEDICINE & REHABILITATION

## 2021-02-28 PROCEDURE — 51798 US URINE CAPACITY MEASURE: CPT

## 2021-02-28 PROCEDURE — 1280000000 HC REHAB R&B

## 2021-02-28 PROCEDURE — 99223 1ST HOSP IP/OBS HIGH 75: CPT | Performed by: PHYSICAL MEDICINE & REHABILITATION

## 2021-02-28 RX ORDER — POLYETHYLENE GLYCOL 3350 17 G/17G
17 POWDER, FOR SOLUTION ORAL DAILY PRN
Status: CANCELLED | OUTPATIENT
Start: 2021-02-28

## 2021-02-28 RX ORDER — ASPIRIN 81 MG/1
81 TABLET ORAL DAILY
Status: CANCELLED | OUTPATIENT
Start: 2021-02-28

## 2021-02-28 RX ORDER — POLYETHYLENE GLYCOL 3350 17 G/17G
17 POWDER, FOR SOLUTION ORAL 2 TIMES DAILY
Status: DISCONTINUED | OUTPATIENT
Start: 2021-02-28 | End: 2021-03-12

## 2021-02-28 RX ORDER — OXYCODONE HYDROCHLORIDE AND ACETAMINOPHEN 5; 325 MG/1; MG/1
1 TABLET ORAL EVERY 4 HOURS PRN
Status: DISPENSED | OUTPATIENT
Start: 2021-02-28 | End: 2021-03-07

## 2021-02-28 RX ORDER — CLONIDINE HYDROCHLORIDE 0.1 MG/1
0.1 TABLET ORAL 3 TIMES DAILY
Status: DISCONTINUED | OUTPATIENT
Start: 2021-02-28 | End: 2021-02-28

## 2021-02-28 RX ORDER — LOSARTAN POTASSIUM 50 MG/1
50 TABLET ORAL DAILY
Status: DISCONTINUED | OUTPATIENT
Start: 2021-02-28 | End: 2021-03-13 | Stop reason: HOSPADM

## 2021-02-28 RX ORDER — OXYCODONE HYDROCHLORIDE AND ACETAMINOPHEN 5; 325 MG/1; MG/1
1 TABLET ORAL EVERY 4 HOURS PRN
Status: CANCELLED | OUTPATIENT
Start: 2021-02-28 | End: 2021-03-07

## 2021-02-28 RX ORDER — DILTIAZEM HYDROCHLORIDE 240 MG/1
240 CAPSULE, COATED, EXTENDED RELEASE ORAL DAILY
Status: DISCONTINUED | OUTPATIENT
Start: 2021-03-01 | End: 2021-02-28

## 2021-02-28 RX ORDER — LOSARTAN POTASSIUM 50 MG/1
50 TABLET ORAL DAILY
Status: CANCELLED | OUTPATIENT
Start: 2021-02-28

## 2021-02-28 RX ORDER — POLYETHYLENE GLYCOL 3350 17 G/17G
17 POWDER, FOR SOLUTION ORAL DAILY PRN
Status: DISCONTINUED | OUTPATIENT
Start: 2021-02-28 | End: 2021-02-28

## 2021-02-28 RX ORDER — DILTIAZEM HYDROCHLORIDE 240 MG/1
240 CAPSULE, COATED, EXTENDED RELEASE ORAL DAILY
Status: CANCELLED | OUTPATIENT
Start: 2021-02-28

## 2021-02-28 RX ORDER — HYDROCHLOROTHIAZIDE 12.5 MG/1
12.5 TABLET ORAL DAILY
Status: CANCELLED | OUTPATIENT
Start: 2021-02-28

## 2021-02-28 RX ORDER — HYDROCHLOROTHIAZIDE 12.5 MG/1
12.5 TABLET ORAL DAILY
Status: DISCONTINUED | OUTPATIENT
Start: 2021-02-28 | End: 2021-03-02

## 2021-02-28 RX ORDER — ASPIRIN 81 MG/1
81 TABLET ORAL DAILY
Status: DISCONTINUED | OUTPATIENT
Start: 2021-02-28 | End: 2021-03-13 | Stop reason: HOSPADM

## 2021-02-28 RX ORDER — GABAPENTIN 100 MG/1
100 CAPSULE ORAL 2 TIMES DAILY PRN
Status: DISCONTINUED | OUTPATIENT
Start: 2021-02-28 | End: 2021-02-28

## 2021-02-28 RX ORDER — DILTIAZEM HYDROCHLORIDE 240 MG/1
240 CAPSULE, COATED, EXTENDED RELEASE ORAL DAILY
Status: DISCONTINUED | OUTPATIENT
Start: 2021-02-28 | End: 2021-03-13 | Stop reason: HOSPADM

## 2021-02-28 RX ORDER — DOCUSATE SODIUM 100 MG/1
100 CAPSULE, LIQUID FILLED ORAL DAILY
Status: DISCONTINUED | OUTPATIENT
Start: 2021-02-28 | End: 2021-03-13 | Stop reason: HOSPADM

## 2021-02-28 RX ORDER — SODIUM PHOSPHATE, DIBASIC AND SODIUM PHOSPHATE, MONOBASIC 7; 19 G/133ML; G/133ML
1 ENEMA RECTAL DAILY PRN
Status: DISCONTINUED | OUTPATIENT
Start: 2021-02-28 | End: 2021-03-13 | Stop reason: HOSPADM

## 2021-02-28 RX ORDER — ONDANSETRON 4 MG/1
4 TABLET, ORALLY DISINTEGRATING ORAL 4 TIMES DAILY PRN
Status: DISCONTINUED | OUTPATIENT
Start: 2021-02-28 | End: 2021-03-13 | Stop reason: HOSPADM

## 2021-02-28 RX ORDER — ACETAMINOPHEN 325 MG/1
650 TABLET ORAL EVERY 4 HOURS PRN
Status: DISCONTINUED | OUTPATIENT
Start: 2021-02-28 | End: 2021-03-13 | Stop reason: HOSPADM

## 2021-02-28 RX ORDER — GABAPENTIN 100 MG/1
100 CAPSULE ORAL 2 TIMES DAILY
Status: DISCONTINUED | OUTPATIENT
Start: 2021-02-28 | End: 2021-03-13 | Stop reason: HOSPADM

## 2021-02-28 RX ORDER — BISACODYL 10 MG
10 SUPPOSITORY, RECTAL RECTAL PRN
Status: DISCONTINUED | OUTPATIENT
Start: 2021-02-28 | End: 2021-03-13 | Stop reason: HOSPADM

## 2021-02-28 RX ORDER — DILTIAZEM HYDROCHLORIDE 240 MG/1
240 CAPSULE, COATED, EXTENDED RELEASE ORAL DAILY
Status: CANCELLED | OUTPATIENT
Start: 2021-03-01

## 2021-02-28 RX ORDER — GABAPENTIN 100 MG/1
100 CAPSULE ORAL 2 TIMES DAILY PRN
Status: CANCELLED | OUTPATIENT
Start: 2021-02-28

## 2021-02-28 RX ORDER — ACETAMINOPHEN 325 MG/1
650 TABLET ORAL EVERY 6 HOURS PRN
Status: DISCONTINUED | OUTPATIENT
Start: 2021-02-28 | End: 2021-03-02

## 2021-02-28 RX ORDER — CLONIDINE HYDROCHLORIDE 0.1 MG/1
0.1 TABLET ORAL 3 TIMES DAILY
Status: CANCELLED | OUTPATIENT
Start: 2021-02-28

## 2021-02-28 RX ORDER — SENNA PLUS 8.6 MG/1
1 TABLET ORAL NIGHTLY
Status: DISCONTINUED | OUTPATIENT
Start: 2021-02-28 | End: 2021-03-13 | Stop reason: HOSPADM

## 2021-02-28 RX ORDER — ACETAMINOPHEN 650 MG/1
650 SUPPOSITORY RECTAL EVERY 6 HOURS PRN
Status: CANCELLED | OUTPATIENT
Start: 2021-02-28

## 2021-02-28 RX ORDER — GABAPENTIN 100 MG/1
100 CAPSULE ORAL 2 TIMES DAILY
Status: CANCELLED | OUTPATIENT
Start: 2021-02-28

## 2021-02-28 RX ORDER — CLONIDINE HYDROCHLORIDE 0.1 MG/1
0.1 TABLET ORAL 2 TIMES DAILY
Status: CANCELLED | OUTPATIENT
Start: 2021-02-28

## 2021-02-28 RX ORDER — CLONIDINE HYDROCHLORIDE 0.1 MG/1
0.1 TABLET ORAL 2 TIMES DAILY
Status: DISCONTINUED | OUTPATIENT
Start: 2021-02-28 | End: 2021-03-13 | Stop reason: HOSPADM

## 2021-02-28 RX ORDER — ACETAMINOPHEN 650 MG/1
650 SUPPOSITORY RECTAL EVERY 6 HOURS PRN
Status: DISCONTINUED | OUTPATIENT
Start: 2021-02-28 | End: 2021-02-28

## 2021-02-28 RX ORDER — ACETAMINOPHEN 325 MG/1
650 TABLET ORAL EVERY 6 HOURS PRN
Status: CANCELLED | OUTPATIENT
Start: 2021-02-28

## 2021-02-28 RX ADMIN — DILTIAZEM HYDROCHLORIDE 240 MG: 240 CAPSULE, COATED, EXTENDED RELEASE ORAL at 08:23

## 2021-02-28 RX ADMIN — CLONIDINE HYDROCHLORIDE 0.1 MG: 0.1 TABLET ORAL at 21:34

## 2021-02-28 RX ADMIN — CLONIDINE HYDROCHLORIDE 0.1 MG: 0.1 TABLET ORAL at 14:01

## 2021-02-28 RX ADMIN — DOCUSATE SODIUM 100 MG: 100 CAPSULE, LIQUID FILLED ORAL at 21:37

## 2021-02-28 RX ADMIN — BISACODYL 10 MG: 10 SUPPOSITORY RECTAL at 21:45

## 2021-02-28 RX ADMIN — SENNOSIDES 8.6 MG: 8.6 TABLET, FILM COATED ORAL at 21:34

## 2021-02-28 RX ADMIN — GABAPENTIN 100 MG: 100 CAPSULE ORAL at 08:23

## 2021-02-28 RX ADMIN — OXYCODONE AND ACETAMINOPHEN 1 TABLET: 5; 325 TABLET ORAL at 21:53

## 2021-02-28 RX ADMIN — CLONIDINE HYDROCHLORIDE 0.1 MG: 0.1 TABLET ORAL at 08:23

## 2021-02-28 RX ADMIN — OXYCODONE HYDROCHLORIDE AND ACETAMINOPHEN 1 TABLET: 5; 325 TABLET ORAL at 11:50

## 2021-02-28 RX ADMIN — GABAPENTIN 100 MG: 100 CAPSULE ORAL at 21:34

## 2021-02-28 RX ADMIN — ENOXAPARIN SODIUM 30 MG: 30 INJECTION SUBCUTANEOUS at 08:24

## 2021-02-28 RX ADMIN — POLYETHYLENE GLYCOL (3350) 17 G: 17 POWDER, FOR SOLUTION ORAL at 21:34

## 2021-02-28 ASSESSMENT — PAIN SCALES - GENERAL
PAINLEVEL_OUTOF10: 9
PAINLEVEL_OUTOF10: 7
PAINLEVEL_OUTOF10: 7

## 2021-02-28 ASSESSMENT — PAIN DESCRIPTION - ONSET: ONSET: ON-GOING

## 2021-02-28 ASSESSMENT — PAIN DESCRIPTION - PROGRESSION
CLINICAL_PROGRESSION: NOT CHANGED
CLINICAL_PROGRESSION: NOT CHANGED

## 2021-02-28 ASSESSMENT — PAIN DESCRIPTION - LOCATION
LOCATION: VAGINA

## 2021-02-28 ASSESSMENT — PAIN DESCRIPTION - DESCRIPTORS
DESCRIPTORS: SHOOTING
DESCRIPTORS: ACHING;DISCOMFORT

## 2021-02-28 ASSESSMENT — PAIN DESCRIPTION - FREQUENCY: FREQUENCY: CONTINUOUS

## 2021-02-28 ASSESSMENT — PAIN DESCRIPTION - PAIN TYPE
TYPE: CHRONIC PAIN
TYPE: CHRONIC PAIN

## 2021-02-28 NOTE — PROGRESS NOTES
2/28/2021    Pt has been accepted to ARU for rehab. Will complete paperwork. She is without complaints except that her tailbone was sore from so much sitting. Lungs clear, heart regular, abd soft, no leg edema. Sodium 133Low  MMOL/L    Potassium 5.3High  MMOL/L    Chloride 98Low  mMol/L    CO2 27 MMOL/L    Anion Gap 8    BUN 26High  MG/DL    CREATININE 1.1 MG/DL    Glucose 98 MG/DL    Calcium 8.1Low  MG/DL    GFR Non-African American 46Low  mL/min/1.73m2    GFR African American 56Low  mL/min/1.73m2     This is the set of labs from yesterday afternoon, todays labs are pending. Stable to go to ARU.   Rea Perry MD

## 2021-02-28 NOTE — H&P
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 History of present illness: Patient is a 60-year-old right-hand-dominant female who suffered an apparent trip and fall in her independent apartment at the [address] [place] [address] on 2021. She does not recall feeling dizzy or tired or having any palpitations preceding the fall. She did state that she does have \"episodes of feeling tired\". She did not describe shaking or palpitations with these episodes. She was able to crawl to reach her phone and alert emergency services. In our ED she had a minimally displaced right subcapital hip fracture. The next day Dr. Terrie Musa performed a hemiarthroplasty. He is requesting posterior hip precautions. Patient has had significant pain, weakness and a drop in hemoglobin. She has been unable to do her own self-care, transfers or toileting. She is also not had a bowel movement throughout this entire surgical stay. She has developed some abdominal distention which bothers her. She did report intermittent flatus. She requires inpatient rehabilitation at this time. Review of systems: Abdominal distention with intermittent flatus. Right hip pain. General fatigue. No chills, cough or sputum production. No change in vision, speech or swallowing, but she states she has chronic difficulty managing pills (choking). There Lemon catheter was just taken out preceding this admission and she has not gone yet. No new numbness or tingling distal in the right lower limb. The remainder of their review of systems was negative except as mentioned in the history of present illness. Social History: The patient is unmarried living alone in a level entry 1 floor apartment at the independent living section of the [address] [place] [address]. She has a walk-in shower, handicap height commode and shower chair. There are grab bars in the bathroom. She typically ambulates without assistive device. She does not drive. She is a retired . She typically looks after her own medications, personal hygiene and light homemaking. She reports that she has never smoked. She has never used smokeless tobacco.    Prior (baseline) level of function: Independent with mobility and self-care. Current level of function: Moderate physical assistance needed for mobility and self-care. Allergies:  Patient has no known allergies. 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 Current Medications:       Family History:   Hypertension, cancer and emphysema. Exam:    Blood pressure 137/65, pulse 73, temperature 98.3 °F (36.8 °C), temperature source Oral, resp. rate 16, height 5' 2.99\" (1.6 m), weight 112 lb 4.8 oz (50.9 kg), SpO2 96 %. General: Patient was seen semirecumbent in bed. She is alert and talkative. Minimally hard of hearing. Oriented x3. HEENT: Visual fields are full. No signs of trauma to her head or neck. About 70 degrees of active cervical spine rotation bilaterally. No adenopathy or JVD.  MMM. Clear speech. Pulmonary: Symmetric air exchange with no rales or rhonchi. Cardiac: Soft systolic murmur with regular rate and rhythm. Abdomen: Patient's abdomen was soft and nondistended. Bowel sounds were present throughout. There was no rebound, guarding or masses noted.     Spinal exam: The skin over her spine was pink but intact. Prominent bony prominences. Slightly exaggerated dorsal kyphosis. Upper extremities: Patient was able to bring her hands up overhead. She had functional  strength and normal tone. Trace reflexes normal sensation. Lower extremities: Swelling about the right hip and thigh as expected. Staples approximate the skin edges well. There is scant serous drainage. She has limited right hip and knee movements due to pain. Ankle dorsiflexion is 4/5 on the right. Left ankle dorsiflexion is incomplete. Dysesthetic quality of sensation was noted with brushing the skin over the left anterior lower leg. Sensation in the right leg was normal.  No signs of DVT. Sitting balance was good. Standing balance was poor. Lab Results   Component Value Date    WBC 5.2 02/27/2021    HGB 9.9 (L) 02/27/2021    HCT 31.7 (L) 02/27/2021    MCV 95.8 02/27/2021     02/27/2021     Lab Results   Component Value Date    INR 0.91 02/22/2021    PROTIME 11.0 (L) 02/22/2021     Lab Results   Component Value Date    CREATININE 1.0 02/28/2021    BUN 25 (H) 02/28/2021     (L) 02/28/2021    K 4.7 02/28/2021    CL 98 (L) 02/28/2021    CO2 29 02/28/2021     Lab Results   Component Value Date     (H) 02/23/2021     (H) 02/23/2021    ALKPHOS 135 (H) 02/23/2021    BILITOT 1.2 (H) 02/23/2021         Impression: 80-year-old female with a history of hypertension and lumbar DDD with a fall that has fractured her right hip. She is status post hemiarthroplasty with poorly controlled pain, generalized weakness and a drop in hemoglobin. Strengths for the patient: Her independent habits prior to admission, accessible home and alertness. Limitations/barriers for the patient: Her age, she lives alone in the unfamiliarity with hip precautions. Recommendation: Acute inpatient rehabilitation with occupational and physical therapy 180 minutes 5 out of every 7 days.  Will address basic and  advancing mobility with self-care instruction and adaptive equipment training. Caregiver education will be offered. Expected length of stay  prior to a supervised level of function for discharge home with a walker and [facility] OT/PT is 2 weeks. Additional recommendation:    1. Right femoral neck fracture with hemiarthroplasty: Patient requires daily occupational and physical therapy. We must emphasize pulmonary hygiene, DVT prophylaxis and cautious pain management. Nutritional support. Bowel and bladder retraining. Management of the manifestations of her sciatica while helping her incorporate hip precautions. Adaptive equipment education. 2. DVT prophylaxis: Lovenox 30 mg subcu daily. I must monitor her hemoglobin and platelet count periodically while on this medication. Weightbearing activities will be pursued daily. GI prophylaxis offered. 3. Uncontrolled hypertension: The patient requires Cardizem, Catapres, Cozaar and hydrochlorothiazide. Target systolic blood pressure is 120140. Vital signs are checked at rest and with activity. Consistent oral hydration is encouraged. 4. Obstipation: The patient has painful distention of her abdomen and no bowel movement for the past week or so. We will get a stat portable abdominal chest x-ray. If no evidence of ileus or obstruction will be very aggressive with suppositories and enemas and then more aggressive oral intervention regularly. 5. Lumbar DDD with sciatica: Diathermy, therapeutic exercises and careful adjustments of gait training to accommodate her foot drop. I personally performed a history and physical on this patient within 24 hours of admission to the rehab unit. I have reviewed the preadmission screening and concur with its findings without change. A detailed plan of care will be established by hospital day 4 and I attest the patient is appropriate for inpatient rehabilitation at this time.   I have compared the patient's current functional status noted during my history and physical with that of the preadmission screen and I have found no significant differences.

## 2021-02-28 NOTE — CARE COORDINATION
Notified Dr. Alicia Pittman of approval from expedited appeal.  Per Dr. Alicia Pittman patient is medically ready for discharge today.

## 2021-02-28 NOTE — PROGRESS NOTES
2/27/2021    Late entry--pt was seen at 1300. Pt was resting comfortably in a chair without complaints. Afebrile, VSS. Heart regular, lungs clear, abd soft, trace ankle edema on hte right. Labs show potassium of 5.6, will recheck again later today. Awaiting placement for rehab, an appeal for ARU is pending. Continue current therapy for recovery from fractured hip with surgery.  Mindy Gerard MD

## 2021-02-28 NOTE — PROGRESS NOTES
A Complete drug regimen review was completed for this patient this date. []  No clinically significant medication issue was identified   [x]  Yes, a clinically significant medication issue was identified     []  Adverse Drug Event:    []  Allergy:    []  Side Effect:    []  Ineffective Therapy:    []  Drug interaction:     []  Duplicate Therapy: Duplicate clonidine, cardizem    []  Untreated Indication:    []  Non-adherence:    []  Other:  Nursing contacted the physician: Dr. Yusef Burt Date: 2/28/2021               Time: 1914    Actions recommended by physician were completed: Date:                 Time:  Action(s) Taken:             []  New Physician Order Received    []  Issue Noted by Physician;  However No Action Required    [x]  Other: Dr. Yusef Burt removed the duplicate orders

## 2021-03-01 PROCEDURE — 6360000002 HC RX W HCPCS: Performed by: INTERNAL MEDICINE

## 2021-03-01 PROCEDURE — 1280000000 HC REHAB R&B

## 2021-03-01 PROCEDURE — 99232 SBSQ HOSP IP/OBS MODERATE 35: CPT | Performed by: PHYSICAL MEDICINE & REHABILITATION

## 2021-03-01 PROCEDURE — 97116 GAIT TRAINING THERAPY: CPT

## 2021-03-01 PROCEDURE — 94150 VITAL CAPACITY TEST: CPT

## 2021-03-01 PROCEDURE — 51798 US URINE CAPACITY MEASURE: CPT

## 2021-03-01 PROCEDURE — 6370000000 HC RX 637 (ALT 250 FOR IP): Performed by: PHYSICAL MEDICINE & REHABILITATION

## 2021-03-01 PROCEDURE — 97163 PT EVAL HIGH COMPLEX 45 MIN: CPT

## 2021-03-01 PROCEDURE — 97167 OT EVAL HIGH COMPLEX 60 MIN: CPT

## 2021-03-01 PROCEDURE — 97535 SELF CARE MNGMENT TRAINING: CPT

## 2021-03-01 PROCEDURE — 94761 N-INVAS EAR/PLS OXIMETRY MLT: CPT

## 2021-03-01 PROCEDURE — 6370000000 HC RX 637 (ALT 250 FOR IP): Performed by: INTERNAL MEDICINE

## 2021-03-01 PROCEDURE — 97530 THERAPEUTIC ACTIVITIES: CPT

## 2021-03-01 RX ADMIN — DILTIAZEM HYDROCHLORIDE 240 MG: 240 CAPSULE, COATED, EXTENDED RELEASE ORAL at 12:33

## 2021-03-01 RX ADMIN — POLYETHYLENE GLYCOL (3350) 17 G: 17 POWDER, FOR SOLUTION ORAL at 12:32

## 2021-03-01 RX ADMIN — OXYCODONE AND ACETAMINOPHEN 1 TABLET: 5; 325 TABLET ORAL at 20:56

## 2021-03-01 RX ADMIN — LOSARTAN POTASSIUM 50 MG: 50 TABLET, FILM COATED ORAL at 12:33

## 2021-03-01 RX ADMIN — OXYCODONE AND ACETAMINOPHEN 1 TABLET: 5; 325 TABLET ORAL at 06:33

## 2021-03-01 RX ADMIN — POLYETHYLENE GLYCOL (3350) 17 G: 17 POWDER, FOR SOLUTION ORAL at 20:50

## 2021-03-01 RX ADMIN — OXYCODONE AND ACETAMINOPHEN 1 TABLET: 5; 325 TABLET ORAL at 12:40

## 2021-03-01 RX ADMIN — GABAPENTIN 100 MG: 100 CAPSULE ORAL at 12:34

## 2021-03-01 RX ADMIN — GABAPENTIN 100 MG: 100 CAPSULE ORAL at 20:50

## 2021-03-01 RX ADMIN — HYDROCHLOROTHIAZIDE 12.5 MG: 12.5 TABLET ORAL at 12:33

## 2021-03-01 RX ADMIN — ASPIRIN 81 MG: 81 TABLET, COATED ORAL at 12:32

## 2021-03-01 RX ADMIN — ENOXAPARIN SODIUM 30 MG: 30 INJECTION SUBCUTANEOUS at 08:37

## 2021-03-01 RX ADMIN — DOCUSATE SODIUM 100 MG: 100 CAPSULE, LIQUID FILLED ORAL at 12:33

## 2021-03-01 RX ADMIN — SENNOSIDES 8.6 MG: 8.6 TABLET, FILM COATED ORAL at 20:50

## 2021-03-01 RX ADMIN — CLONIDINE HYDROCHLORIDE 0.1 MG: 0.1 TABLET ORAL at 20:50

## 2021-03-01 ASSESSMENT — PAIN DESCRIPTION - PAIN TYPE
TYPE: CHRONIC PAIN
TYPE: CHRONIC PAIN

## 2021-03-01 ASSESSMENT — PAIN DESCRIPTION - FREQUENCY
FREQUENCY: CONTINUOUS
FREQUENCY: CONTINUOUS

## 2021-03-01 ASSESSMENT — PAIN DESCRIPTION - DESCRIPTORS
DESCRIPTORS: DISCOMFORT
DESCRIPTORS: DISCOMFORT

## 2021-03-01 ASSESSMENT — PAIN SCALES - GENERAL: PAINLEVEL_OUTOF10: 1

## 2021-03-01 ASSESSMENT — PAIN DESCRIPTION - ONSET
ONSET: ON-GOING
ONSET: ON-GOING

## 2021-03-01 ASSESSMENT — PAIN DESCRIPTION - LOCATION: LOCATION: VAGINA

## 2021-03-01 NOTE — PROGRESS NOTES
Occupational Therapy                              [facility] ARU OCCUPATIONAL THERAPY EVALUATION    Chart Review:  No past medical history on file. No past surgical history on file. Social History:  Social/Functional History  Lives With: Alone  Type of Home: (Pt reports living at [facility]  Home Layout: One level  Home Access: Stairs to enter without rails  Entrance Stairs - Number of Steps: threshold step at the door (garage entrance)  Bathroom Shower/Tub: Tub/Shower unit, Walk-in shower, Shower chair with back(soaked in tub at baseline)  [address]: [address] [place] [address] [address]: Toilet raiser  Bathroom Accessibility: (2nd bathroom c walk-in shower is w/c/fww. However pt reports use of tu/shower bathroom and is not w/c and walker accessible.)  Home Equipment: Cane  ADL Assistance: Independent  Homemaking Responsibilities: Yes  Meal Prep Responsibility: Primary  Laundry Responsibility: Primary  Cleaning Responsibility: Primary  Bill Paying/Finance Responsibility: Primary(paige Lam)  is POA)  Shopping Responsibility: Primary  Dependent Care Responsibility: No  Health Care Management: Primary  Ambulation Assistance: Independent  Transfer Assistance: Independent  Active : No  Patient's  Info: Pt uses [employer]  Mode of Transportation: Bus  Occupation: Retired  Leisure & Hobbies: Pt reports being very busy however enjoys painting: water color and oil painting. Pt also enjoys reading. Pt also reports enjoying walks however d/t balance concerns pt has stopped.   Additional Comments: sleeps in regular,  flat bed; ~3 falls in the past year with recent fall requiring hospitalization due to injury    Restrictions:  Restrictions/Precautions  Restrictions/Precautions: Fall Risk, Weight Bearing, ROM Restrictions  Lower Extremity Weight Bearing Restrictions  Right Lower Extremity Weight Bearing: Weight Bearing As Tolerated     Position Activity Restriction  Hip Precautions: No hip flexion > 90 degrees, No hip internal rotation, No ADduction, Posterior hip precautions         Pain Level: 7  Pain Location: Vagina    Objective:       Orientation  Overall Orientation Status: Within Functional Limits  Orientation Level: Oriented X4        Vision  Vision: Impaired  Vision Exceptions: Wears glasses at all times  Vision - Basic Assessment  Prior Vision: Wears glasses all the time  Patient Visual Report: Diplopia, Balance difficulty, Difficulty maintaining concentration with focus, Blurring of print when reading, Unable to keep objects in focus, Blurring of vision when changing focal distance  Vision Comments: Pt has tri-focals, prisms, and focal issues at baseline. Pt c/o double vision at a distance and reports baseline status. Hearing  Hearing: Exceptions to [facility]  Hearing Exceptions: Hard of hearing/hearing concerns, Bilateral hearing aid(Pt reports having hearing aids however they don't work well.)    ROM:      LUE AROM (degrees)  LUE AROM : WFL     Left Hand AROM (degrees)  Left Hand AROM: WFL     RUE AROM (degrees)  RUE AROM : WFL     Right Hand AROM (degrees)  Right Hand AROM: WFL    Strength:    LUE Strength  Gross LUE Strength: Exceptions to [facility]  L Hand General: 3+/5  LUE Strength Comment: Decreased BUE strenth, pt c/o pain during MMT when resistance was provideded  RUE Strength  Gross RUE Strength: Exceptions to [facility]  R Hand General: 3+/5  RUE Strength Comment: Decreased BUE strenth, pt c/o pain during MMT when resistance was provided    Quality of Movement:   Tone RUE  RUE Tone: Normotonic  Tone LUE  LUE Tone: Normotonic  Coordination  Movements Are Fluid And Coordinated: No  Coordination and Movement description: Fine motor impairments, Decreased accuracy, Decreased speed, Left UE, Right UE       Sensation:    Sensation  Overall Sensation Status: Impaired(Pt c/o numbness/tingling in hands and feet.)     ADLs:  Eating: Eating  Assistance Needed: Setup or clean-up assistance  CARE Score: 5  Discharge Goal: Independent       Oral Hygiene: Oral Hygiene  Assistance Needed: Setup or clean-up assistance  Comment: Completed seated sink-side, required setup A to retrieve and open required items for oral hygiene. CARE Score: 5  Discharge Goal: Independent    UB/LB Bathing: Shower/Bathe Self  Assistance Needed: Partial/moderate assistance  Comment: Min A c distal BLE, pt seated in w/c for UB, requires CGA in stance for posterior/anterior melony-area. CARE Score: 3  Discharge Goal: Independent    UB Dressing: Upper Body Dressing  Assistance Needed: Partial/moderate assistance  Comment: Min A to maintain balance/upright posture, required assist c reaching back for RUE of button up. CARE Score: 3  Discharge Goal: Independent         LB Dressing:   Lower Body Dressing  Assistance Needed: Substantial/maximal assistance  Comment: Max A to thread BLE and assist c donning over hips. Pt requires steadying-Min A in stance 2* c/o dizziness. CARE Score: 2  Discharge Goal: Independent    Donning and [place] Footwear: Putting On/Taking Off Footwear  Assistance Needed: Dependent  Comment: Dependent for donning/doffing footwear, limited by posterior hip precautions. CARE Score: 1  Discharge Goal: Independent      Toileting: Toileting Hygiene  Assistance Needed: Partial/moderate assistance  Comment: Pt requires Min A for assistance c CM. Pt demo toileting hygiene in stance, requires CGA for steadying. CARE Score: 3  Discharge Goal: Independent      Bed Mobility:    Bed mobility  Supine to Sit: Stand by assistance  Scooting: Stand by assistance    Transfers:    Transfers  Sit to stand: Minimal assistance  Stand to sit: Minimal assistance           Toilet Transfer  Assistance Needed: Partial/moderate assistance  Comment: Min A required for controlled descent to sit on toilet, requires use of grab bars to sit/get off toilet. Min A provided to get off toilet.   CARE Score: 3  Discharge Goal: Independent    Functional Mobility:    Balance  Sitting Balance: Stand by assistance  Standing Balance: Contact guard assistance     Functional Mobility  Functional - Mobility Device: Rolling Walker  Activity: To/from bathroom  Assist Level: Minimal assistance     Cognition:  Cognition  Overall Cognitive Status: WFL    Perception:  Perception  Overall Perceptual Status: WFL      Assessment:     Performance deficits / Impairments: Decreased functional mobility , Decreased balance, Decreased coordination, Decreased vision/visual deficit, Decreased posture, Decreased ADL status, Decreased high-level IADLs, Decreased ROM, Decreased fine motor control, Decreased strength    The patient is a 80year old female admitted onto ARU after hospitalization for Right femoral neck fracture as a result of a ground level fall. Per MD notes. \" Pt suffered an apparent trip and fall in her independent apartment at the [address] [place] [address] on 2/22/2021. She does not recall feeling dizzy or tired or having any palpitations preceding the fall. In our ED she had a minimally displaced right subcapital hip fracture. The next day Dr. Adrián Mendes performed a hemiarthroplasty. He is requesting posterior hip precautions. \" Pt reports IND c all ADLs/IADLs at [facility]. Today pt required Min-Total A for  Completion of ADLs as a result of above performance deficits/impairments. Pt's major barriers during evaluation was c/o hip pain, dizziness, \"room spining. \" Pt reports baseline double vision which can be a high fall risk factor as well as impair pt's safe completion of ADLs. Anticipating pt will meet OT goals set for MOD I upon d/c as pt is very motivated, was active, and IND at [facility]. The QI, MMT, and ROM standardized assessments were used this date to determine the above performance deficits, which compromise pt's ability to safely complete ADLs/IADLs/mobility.   Pt will benefit from ARU OT services to increase functional performance, safety, and achieve the highest level of week, plus group therapy as appropriate  Current Treatment Recommendations: Strengthening, Patient/Caregiver Education & Training, Equipment Evaluation, Education, & procurement, Self-Care / ADL, Pain Management, Balance Training, Home Management Training, Functional Mobility Training, Safety Education & Training, ROM    OT Individual Minutes  Time In: 6236  Time Out: 3670  Minutes: 90                Number of Minutes/Billable Intervention      OT Evaluation 20   Therapeutic Exercise    ADL Self-care 70   Neuro Re-Ed    Therapeutic Activity    Group    Other:    TOTAL 90     Electronically signed by:    VAL Brown/L  License # [id number]      3/1/2021, 4:54 PM

## 2021-03-01 NOTE — PROGRESS NOTES
Comprehensive Nutrition Assessment    Type and Reason for Visit:  Initial, Consult(oral nutrition supplement)    Nutrition Recommendations/Plan:   Continue general diet with oral nutrition supplements during stay  Assist with meals as needed   Will continue to follow up during stay     Nutrition Assessment:  Rehab admit with hx fall with fracture now s/p hemiarthoplasty. Currently on general diet with frozen and standard supplements offered. Meal intake 25-50%. Will eat some of magic cup and agreeable to ensure. Remains high nutrition risk at this time with hx inadequate intake. Malnutrition Assessment:  Malnutrition Status: At risk for malnutrition (Comment)    Context:  Acute Illness       Estimated Daily Nutrient Needs:  Energy (kcal):  8033-8986 (27-30 shalonda/kg); Weight Used for Energy Requirements:  Current     Protein (g):  75 (1.5 g/kg);  Weight Used for Protein Requirements:  Current        Fluid (ml/day):  9590-1597; Method Used for Fluid Requirements:  1 ml/kcal      Nutrition Related Findings:  sitting up in bed eating lunch, assisted with opening ensure, noted episode nausea/vomiting today with more lethargy, monitoring for constipation      Wounds:  Surgical Incision       Current Nutrition Therapies:    DIET GENERAL;  Dietary Nutrition Supplements: Frozen Oral Supplement  Dietary Nutrition Supplements: Standard High Calorie Oral Supplement    Anthropometric Measures:  · Height: 5' 2.99\" (160 cm)  · Current Body Weight: 110 lb 14.3 oz (50.3 kg)   · Admission Body Weight:      · Usual Body Weight: (per pt 100#)     · Ideal Body Weight: 115 lbs; % Ideal Body Weight 96.4 %   · BMI: 19.6  · Adjusted Body Weight:  ; No Adjustment   · BMI Categories: Underweight (BMI less than 22) age over 72       Nutrition Diagnosis:   · Predicted inadequate energy intake related to increase demand for energy/nutrients as evidenced by BMI, wounds      Nutrition Interventions:   Food and/or Nutrient Delivery:  Continue Current Diet, Continue Oral Nutrition Supplement  Nutrition Education/Counseling:  No recommendation at this time   Coordination of Nutrition Care:  Continue to monitor while inpatient, Feeding Assistance/Environment Change    Goals:  Patient will consume at least 50-75% at meals during stay       Nutrition Monitoring and Evaluation:   Behavioral-Environmental Outcomes:  None Identified   Food/Nutrient Intake Outcomes:  Diet Advancement/Tolerance, Food and Nutrient Intake, Supplement Intake  Physical Signs/Symptoms Outcomes:  Biochemical Data, Skin, Weight, Nausea or Vomiting, Constipation, Meal Time Behavior     Discharge Planning:    Continue current diet     Electronically signed by Michael Portillo RD, LD on 3/1/21 at 1:54 PM EST    Contact: [phone]

## 2021-03-01 NOTE — PROGRESS NOTES
Physical Therapy    [facility]    Chart Review:  No past medical history on file. No past surgical history on file. Fall History:  ~3; recent fall with injury requiring this hospitalization   Social History:  Social/Functional History  Lives With: Alone  Type of Home: (Pt reports living at [facility])  Home Layout: One level  Home Access: Stairs to enter without rails  Entrance Stairs - Number of Steps: threshold step at the door (garage entrance)  Bathroom Shower/Tub: Tub/Shower unit, Walk-in shower, Shower chair with back(soaked in tub at baseline)  [employer]: Standard  Bathroom Equipment: Toilet raiser  Bathroom Accessibility: (2nd bathroom c walk-in shower is w/c/fww. However pt reports use of tu/shower bathroom and is not w/c and walker accessible.)  Home Equipment: Cane  ADL Assistance: Independent  Homemaking Responsibilities: Yes  Meal Prep Responsibility: Primary  Laundry Responsibility: Primary  Cleaning Responsibility: Primary  Bill Paying/Finance Responsibility: Primary(niece Efren Buckner)  is POA)  Shopping Responsibility: Primary  Dependent Care Responsibility: No  Health Care Management: Primary  Ambulation Assistance: Independent  Transfer Assistance: Independent  Active : No  Patient's  Info: Pt uses [facility] [facility] [employer]  Mode of Transportation: Bus  Occupation: Retired  Leisure & Hobbies: Pt reports being very busy however enjoys painting: water color and oil painting. Pt also enjoys reading. Pt also reports enjoying walks however d/t balance concerns pt has stopped.   Additional Comments: sleeps in regular,  flat bed; ~3 falls in the past year with recent fall requiring hospitalization due to injury    Restrictions:  Restrictions/Precautions  Restrictions/Precautions: Fall Risk, Weight Bearing, ROM Restrictions  Position Activity Restriction  Hip Precautions: No hip flexion > 90 degrees, No hip internal rotation, No ADduction, Posterior hip precautions    Subjective: Pt in bed with body in poor position stating some food feels like lodged and reports swallowing difficulty at times, spits/throws up thick, clear secretions.     Pain Level: 7  Pain Location: Vagina    Objective:  Orientation  Overall Orientation Status: Within Functional Limits  Orientation Level: Oriented X4        Vision  Vision: Impaired  Vision Exceptions: Wears glasses at all times  Hearing  Hearing: Exceptions to [facility]  Hearing Exceptions: Hard of hearing/hearing concerns, Bilateral hearing aid(Pt reports having hearing aids however they don't work well.)    ROM:      AROM RLE (degrees)  RLE General AROM: impaired due to pain and weakness S/P hip surgery     AROM LLE (degrees)  LLE AROM : WFL                 Strength:    Strength RLE  Comment: impaired hip and knee strength S/P hip surgery  Strength LLE  Strength LLE: WFL              Bed Mobility:   Lying to Sitting on Side of Bed  Assistance Needed: Supervision or touching assistance(SBA-Sup without use of bed features; required VCs not to internally rotate R hip as she moved her RLE off the bed; once transitioned to sitting pt had spinning sensation requiring CGA on trunk to maintain postural stability)  CARE Score: 4  Discharge Goal: Independent  Roll Left and Right  Assistance Needed: Partial/moderate assistance(Rolling to R Min A; SBA to L with additional set-up assist of pillow between thighs to avoid adduction of R hip beyond midline)  CARE Score: 3  Discharge Goal: Independent  Sit to Lying  Assistance Needed: Supervision or touching assistance(SBA-Sup; pt required 2 attempts to complete transfer; actively lifted RLE with increased time and effort)  CARE Score: 4  Discharge Goal: Independent    Transfers:    Sit to Stand  Assistance Needed: Partial/moderate assistance(Min A using RW; VCs on proper hand placement as pt immediately wanted to pull onto RW)  CARE Score: 3  Discharge Goal: Independent  Chair/Bed-to-Chair Transfer  Assistance Needed: Partial/moderate assistance(Min A using RW; required VCs on hand placement to armrests of w/c)  CARE Score: 3  Discharge Goal: Independent  Toilet Transfer  Assistance Needed: Partial/moderate assistance(Min A using grab bars)  CARE Score: 3  Car Transfer  Assistance Needed: Partial/moderate assistance(Min A using RW; pt was able to actively lift BLE in and out of car but required VCs on RLE positioning following posterior hip precautions)  CARE Score: 3  Discharge Goal: Independent    Ambulation:   Device used PTA: none    Walking Ability  Does the Patient Walk?: Yes     Walk 10 Feet  Assistance Needed: Partial/moderate assistance(Min A using RW)  CARE Score: 3  Discharge Goal: Supervision or touching assistance     Walk 50 Feet with Two Turns  Reason if not Attempted: Not attempted due to medical condition or safety concerns(pt was only able to tolerate 18 ft today (limited by fatigue and dizziness))  CARE Score: 88  Discharge Goal: Supervision or touching assistance     Walk 150 Feet  Reason if not Attempted: Not attempted due to medical condition or safety concerns  CARE Score: 88  Discharge Goal: Supervision or touching assistance     Walking 10 Feet on Uneven Surfaces  Assistance Needed: Partial/moderate assistance(Min A over carpeted/transitional surface using RW)  CARE Score: 3  Discharge Goal: Supervision or touching assistance     1 Step (Curb)  Comment: will require continued assessment due to time constraint today; pt moved slowly and had spinning sensation with positional changes requiring extra time for Sx to improve/resolve  Discharge Goal: Supervision or touching assistance     4 Steps  Reason if not Attempted: Not applicable  CARE Score: 9  Discharge Goal: Not Applicable     12 Steps  Reason if not Attempted: Not applicable  CARE Score: 9  Discharge Goal: Not Applicable    Gait Deviations: []None [x]Slow mj  [] Increased DIONNE  [x] Staggers []Deviated Path  [x] Decreased step length  [x] Decreased step height  []Decreased arm swing  [] Shuffles  [] Decreased head and trunk rotation  [x]other: mildly antalgic         Wheelchair:  w/c Ability: Wheelchair Ability  Uses a Wheelchair and/or Scooter? : (will require continued assessment due to time constraint today)                Balance:        Object: Picking Up Object  Assistance Needed: Partial/moderate assistance(required Min A using RW and reacher)  CARE Score: 3  Discharge Goal: Independent    Assessment:   The patient is a 80year old female admitted onto ARU after hospitalization for fall at home but was unable to specify reason. She sustained closed femoral neck Fx of RLE S/P R hemiarthroplasty. Pt is WBAT on RLE and is to follow posterior hip precautions. Pt had minimal pain on the surgical site but reports chronic pain on tailbone/vaginal area. She was able to recall 2 out of the 3 posterior hip precautions and will need ongoing education and training to ensure full understanding and compliance. Her activity tolerance was more limited by spinning sensation today with positional changes but denied Hx of vertigo. Her Sx progressed to a brief pre-syncopal-like episode when she was about to perform a toilet transfer. Her dizziness had variable intensity but did not appear to completely resolve during this PT eval. This Sx increases her fall risk if it persists and so use of manual w/c will be considered as an alternative form of mobility. Pt demonstrates ability to stand and ambulate and so gait training will also be addressed with close monitoring of the said Sx.        Body structures, Functions, Activity limitations: Decreased functional mobility , Decreased high-level IADLs, Decreased ADL status, Decreased endurance, Decreased ROM, Decreased strength, Decreased balance, Vestibular Impairment, Increased pain, Decreased vision/visual deficit     Prognosis: Good  Decision Making: High Complexity  Clinical Presentation: unstable/unpredictable characteristics      Patient education:   ARU schedule, ARU expectations for participation, plan of care, posterior hip precautions   Treatment Initiated:  Functional mobility training, gait training, patient education  Barriers to Improvement: dizziness , worsening vision   Discharge Recommendations:  [facility] PT   Equipment Recommendations: RW, TBD if needing w/c     Goals:  Patient Goals   Patient goals : to be able to care for self and return to [place] [address] term goals  Time Frame for Short term goals: 10 tx days:  Short term goal 1: Pt will complete bed mobility tasks (rolling L/R, scooting, and sup<->sit) Ind following posterior hip precautions. Short term goal 2: Pt will complete OOB transfers using RW Mod Ind. Short term goal 3: Pt will ambulate 150 ft using RW over level surface and 10 ft over carpeted/transitional surface with SBA. Short term goal 4: Pt will ascend/descend curb step using RW with CGA. Short term goal 5: Pt will complete object retrieval from the floor with 1UE support on RW and using reacher Mod Ind.      Plan:    REQUIRES PT FOLLOW UP: Yes  Pt will be seen at least 60 minutes per day for a minimum of 5 days per week, plus group therapy as appropriate  Plan  Times per day: Daily  Current Treatment Recommendations: Strengthening, Gait Training, Patient/Caregiver Education & Training, ROM, Equipment Evaluation, Education, & procurement, Balance Training, Pain Management, Functional Mobility Training, Endurance Training, Home Exercise Program, Positioning, Transfer Training, Safety Education & Training, Wheelchair Mobility Training    PT Individual Minutes  Time In: 0830  Time Out: 1090  Minutes: 102   Variance: +12  Reason: extra time to complete tasks and due to dizziness and swallowing issues      Timed Code Treatment Minutes: 87 Minutes    Number of Minutes/Billable Intervention      PT Evaluation 15   Gait Training 30 Therapeutic Exercise    Neuro Re-Ed    Therapeutic Activity 57   Wheelchair Propulsion    Group    Other:    TOTAL 102       Electronically signed by:    Sydney Perry, PT   3/1/2021, 16:48

## 2021-03-01 NOTE — PLAN OF CARE
ARU Interdisciplinary Plan of Care (IPOC)  [facility] Dr. Kelsi Jean [facility], [address]  [phone]  Fax: [phone]        Juliana Mathis    : 1923  Acct #: [de-identified]  MRN: [id number]   PHYSICIAN:  Issa Juan MD  Primary Active Problems:   Active Hospital Problems    Diagnosis Date Noted    DDD (degenerative disc disease), lumbar [M51.36]     Sciatica, left side [M54.32]     Closed displaced fracture of right femoral neck ([address] [S72.001A] 2021    Uncontrolled hypertension [I10]        Rehabilitation Diagnosis:     Fracture of unspecified part of neck of right femur, initial encounter for closed fracture [S72.001A]  Closed displaced fracture of right femoral neck ([address] [S72.001A]       ADMIT DATE:2021   CARE PLAN     NURSING:  Juliana Mathis while on this unit will:      Bowel and Bladder   [] Be continent of bowel and bladder      [] Have an adequate number of bowel movements   [x] Urinate with no urinary retention >300ml in bladder   [x] Bladder Scan: (details)   [x] Complete bladder protocol with mcdonough removal   [] Initiate Bladder Program to toilet every ___ hours   [] Initiate Bowel Program to toilet every ___hours   [] Bladder training    [] Bowel training  Pulmonary   [x] Maintain O2 SATs at 92% or greater  Pain Management   [x] Have pain managed while on ARU        [x] Be pain free by discharge    [] Medication Management and Education  Maintenance of Skin Integrity/Wound Management   [x] Have no skin breakdown while on ARU   [] Have improved skin integrity via wound measurements   [x] Have no signs/symptoms of infection via infection protection and monitoring at the          wound site  Fall Prevention   [x] Be free from injury during hospitalization via fall prevention measures     [] Disease management and Education  Precautions   [] Weight Bearing Precautions   [] Swallowing Precautions   [] Monitoring of Risks of Complications   [] DVT Prophylaxis    [] Fluid/electrolyte/Nutrition Management    [] Complete education with patient/family with understanding demonstrated for          in-room safety with transfers to bed, toilet, wheelchair, shower as well as                bathroom activities and hygiene. [] Adjustment   [] Other:   Nursing interventions may include bowel/bladder training, education for medical assistive devices, medication education, O2 saturation management, energy conservation, stress management techniques, fall prevention, alarms protocol, seating and positioning, skin/wound care, pressure relief instruction,dressing changes,  infection protection, DVT prophylaxis, and/or assistance with in room safety with transfers to bed, toilet, wheelchair, shower as well as bathroom activities and hygiene. Patient/caregiver education for:   [x] Disease/sustained injury/management      [x] Medication Use   [x] Surgical intervention   [x] Safety/Precautions   [x] Body mechanics and or joint protection   [x] Health maintenance         PHYSICAL THERAPY:  Goals:                  Short term goals  Time Frame for Short term goals: 10 tx days:  Short term goal 1: Pt will complete bed mobility tasks (rolling L/R, scooting, and sup<->sit) Ind following posterior hip precautions. Short term goal 2: Pt will complete OOB transfers using RW Mod Ind. Short term goal 3: Pt will ambulate 150 ft using RW over level surface and 10 ft over carpeted/transitional surface with SBA. Short term goal 4: Pt will ascend/descend curb step using RW with CGA. Short term goal 5: Pt will complete object retrieval from the floor with 1UE support on RW and using reacher Mod Ind. Short term goal 6: Pt will propel manual w/c >/=150 ft Mod Ind. These goals were reviewed with this patient at the time of assessment and Benitez Roberts is in agreement.      Plan of Care: Pt to be seen 5 days per week for a minimum of 60 to actively lift BLE in and out of car but required VCs on RLE positioning following posterior hip precautions)  CARE Score: 3  Discharge Goal: Independent    Ambulation:    Walking Ability  Does the Patient Walk?: Yes     Walk 10 Feet  Assistance Needed: Partial/moderate assistance(Min A using RW)  CARE Score: 3  Discharge Goal: Supervision or touching assistance     Walk 50 Feet with Two Turns  Reason if not Attempted: Not attempted due to medical condition or safety concerns(pt was only able to tolerate 18 ft today (limited by fatigue and dizziness))  CARE Score: 88  Discharge Goal: Supervision or touching assistance     Walk 150 Feet  Reason if not Attempted: Not attempted due to medical condition or safety concerns  CARE Score: 88  Discharge Goal: Supervision or touching assistance     Walking 10 Feet on Uneven Surfaces  Assistance Needed: Partial/moderate assistance(Min A over carpeted/transitional surface using RW)  CARE Score: 3  Discharge Goal: Supervision or touching assistance     1 Step (Curb)  Comment: pt was too lethargic to attempt today and had persistent dizziness  Reason if not Attempted: Not attempted due to medical condition or safety concerns  CARE Score: 88  Discharge Goal: Supervision or touching assistance     4 Steps  Reason if not Attempted: Not applicable  CARE Score: 9  Discharge Goal: Not Applicable     12 Steps  Reason if not Attempted: Not applicable  CARE Score: 9  Discharge Goal: Not Applicable    Gait Deviations: []None []Slow mj  [] Increased DIONNE  [] Staggers []Deviated Path  [] Decreased step length  [] Decreased step height  []Decreased arm swing  [] Shuffles  [] Decreased head and trunk rotation  []other:        Wheelchair:  w/c Ability: Wheelchair Ability  Uses a Wheelchair and/or Scooter?: Yes  Wheel 50 Feet with Two Turns  Assistance Needed: Supervision or touching assistance(SBA-Sup)  CARE Score: 4  Discharge Goal: Independent  Wheel 150 Feet  Assistance Needed: Partial/moderate assistance(pt was only able to propel up to 76 ft with SBA-Sup; activity tolerance was limited by fatigue and worsening lethargy)  CARE Score: 3  Discharge Goal: Independent          Balance:        Object: Picking Up Object  Assistance Needed: Partial/moderate assistance(required Min A using RW and reacher)  CARE Score: 3  Discharge Goal: Independent  OCCUPATIONAL THERAPY:  Goals:             Short term goals  Time Frame for Short term goals: STG=LTG :  Long term goals  Time Frame for Long term goals : 7-10 days or until d/c  Long term goal 1: Pt will complete feeding/grooming/oral care task c MOD I by d/c  Long term goal 2: Pt will complete total body bathing c MOD I by d/c  Long term goal 3: Pt will complete total body dressing (UB/LB/Footwear) c MOD I by d/c  Long term goal 4: Pt will complete toileting c MOD I by d/c  Long term goal 5: Pt will complete functional transfer (toilet, tub, shower) c MOD I by d/c. Long term goals 6: Pt will perform therex/therax to facilitate increased strength/endurance/ax tolerance (c emphasis on dynamic standing balance/tolerance >10 mins and BUE endurance) c MOD I in order to complete ADLs. :    These goals were reviewed with this patient at the time of assessment and Nino Romano is in agreement    Plan of Care:  Pt to be seen 5 days per week for a minimum of 60 minutes for 7-10 days. Plan  Times per day: Daily  Current Treatment Recommendations: Strengthening, Patient/Caregiver Education & Training, Equipment Evaluation, Education, & procurement, Self-Care / ADL, Pain Management, Balance Training, Home Management Training, Functional Mobility Training, Safety Education & Training, ROM         cognitive training, home management, energy conservation training, community reintegration, splint fabrication, patient/caregiver education and training, animal assisted therapy, and concurrent and/or group therapy.       OT IRF-MUSTAPHA scores and goals for initial assessment:    ADLs:    Eating: Eating  Assistance Needed: Setup or clean-up assistance  CARE Score: 5  Discharge Goal: Independent       Oral Hygiene: Oral Hygiene  Assistance Needed: Setup or clean-up assistance  Comment: Completed seated sink-side, required setup A to retrieve and open required items for oral hygiene. CARE Score: 5  Discharge Goal: Independent    UB/LB Bathing: Shower/Bathe Self  Assistance Needed: Partial/moderate assistance  Comment: Min A c distal BLE, pt seated in w/c for UB, requires CGA in stance for posterior/anterior melony-area. CARE Score: 3  Discharge Goal: Independent    UB Dressing: Upper Body Dressing  Assistance Needed: Partial/moderate assistance  Comment: Min A to maintain balance/upright posture, required assist c reaching back for RUE of button up. CARE Score: 3  Discharge Goal: Independent         LB Dressing: Lower Body Dressing  Assistance Needed: Substantial/maximal assistance  Comment: Max A to thread BLE and assist c donning over hips. Pt requires steadying-Min A in stance 2* c/o dizziness. CARE Score: 2  Discharge Goal: Independent    Donning and [place] Footwear: Putting On/Taking Off Footwear  Assistance Needed: Dependent  Comment: Dependent for donning/doffing footwear, limited by posterior hip precautions. CARE Score: 1  Discharge Goal: Independent      Toileting: Toileting Hygiene  Assistance Needed: Partial/moderate assistance  Comment: Pt requires Min A for assistance c CM. Pt demo toileting hygiene in stance, requires CGA for steadying. CARE Score: 3  Discharge Goal: Independent      Toilet Transfers: Toilet Transfer  Assistance Needed: Partial/moderate assistance  Comment: Min A required for controlled descent to sit on toilet, requires use of grab bars to sit/get off toilet. Min A provided to get off toilet.   CARE Score: 3  Discharge Goal: Independent      SPEECH THERAPY: (If ordered)  Plan of Care and Goals:   LTG LTG:                           Treatments may include speech/language/communication therapy, cognitive training, animal assisted therapy, group therapy, education, and/or dysphagia therapy based on the above goals. Co-treats where appropriate with PT or OT to facilitate patient goals in functional tasks. These goals were reviewed with this patient at the time of assessment and Suresh Juliet is in agreement. CASE MANAGEMENT:  Goals:   Assist patient/family with discharge planning, patient/family counseling, assistance in obtaining recommended equipment and other services, and coordination with insurance during ARU stay. Patient Goals:   Return to maximum level of independent function. Nutrition goal: Patient will consume at least 50-75% at meals during stay       Activities Prior to Admit:   Homemaking Responsibilities: Yes  Active : No  Mode of Transportation: Bus  Occupation: Retired  Leisure & Hobbies: Pt reports being very busy however enjoys painting: water color and oil painting. Pt also enjoys reading. Pt also reports enjoying walks however d/t balance concerns pt has stopped. Intensity of Therapy  Suresh Chung will be seen a minimum of 3 hours of therapy per day/a minimum of 5 out of 7 days per week. [] In this rare instance due to the nature of this patient's medical involvement, this patient will be seen 15 hours per week (900 minutes within a 7 day period). Treatments may include therapeutic exercises, gait training, neuromuscular re-ed, transfer training, community reintegration, bed mobility, w/c mobility and training, self care, home mgmt, cognitive training, energy conservation,dysphagia tx, speech/language/communication therapy, group therapy, and patient/family education. In addition, dietician/nutritionist may monitor calorie count as well as intake and collaboratively work with SLP on dietary upgrades.   Neuropsychology/Psychology may evaluate and provide necessary support. Group therapy as appropriate to facilitate improved endurance, STR, COORD, function, safety, transfers, awareness and insight into deficits, problem solving, memory, and social interaction and engagement. Medical issues being managed closely and that require 24 hour availability of a physician:   [] Swallowing Precautions                                     [] Weight bearing precautions   [x] Wound Care                             [x] Infection Prevention   [x] DVT Prophylaxis/assessment              [x] Monitoring for complications    [x] Fall Precautions/Prevention                         [x] Fluid/Electrolyte/Nutrition Balance   [] Voice Protection                           [x] Medication Management   [x] Respiratory                   [x] Pain Mgmt   [x] Bowel/Bladder Fx    Medical Prognosis: [] Good  [] Fair    [] Guarded   Total expected IRF days 14                                             Physician anticipated functional outcomes:  FWW and HHC OT/PT and supervision.   Rehab Goals:   [] Return to premorbid function of_______________________________.    [] Independent   [] Mod I  [x] Supervision  [] CGA   [] Min A   [] Mod A  Level for ambulation []without assistive device  [x] with assistive device        [] Independent   [] Mod I  [x] Supervision [] CGA   [] Min A   [] Mod A  Level for transfers []without assistive device  [x] with assistive device         [] Independent   [] Mod I  [x] Supervision [] CGA   [] Min A   [] Mod A Level with ADL's []without assistive device   [x] with assistive device     ___________________     Level with cognitive skills requiring [] No assist [x] Supervision  [] Active Assist/Cues     [] Maximize level of mobility and ADL's to decrease burden on caregiver    IPOC brief synthesis of Preadmission Screen, Post-Admission Evaluation, and Therapy Evaluations: Acute inpatient rehabilitation with occupational and physical therapy 180 ______________________  Patient/Significant Other      Date    I have reviewed this initial plan of care and agree with its contents:    Title   Name    Date    Time    Physician: Latoya Alves 3/3/2021 1:08 PM    Case Mgmt:  Saloni Gardner 3/2/2021 1213    OT: Bharati Washington, OTR/L 3/1/2021    PT: Serafin Jorgensen PT     03/01/2021    17:00    RN: Paulo Mary 2/28/2021  1918    ST:    Dietician: Angel Avalos RD, LD 03/01/2021  13:55

## 2021-03-01 NOTE — PROGRESS NOTES
Physician Progress Note      Tyler Mendoza  [facility] #:                  [id number]  :                       1923  ADMIT DATE:       2021 6:16 PM  Elvis Davis DATE:        2021 5:30 PM  RESPONDING  PROVIDER #:        Gennette Najjar PA-C          QUERY TEXT:    Dear medical provider,    Pt admitted with right hip fracture. Pt noted to have osteoarthritis. If   possible, please document in progress notes and discharge summary if you are   evaluating and/or treating any of the following: The medical record reflects the following:  Risk Factors: history of osteoarthritis  Clinical Indicators: XR w pelvis right: There is a mildly offset acute right   femoral neck fracture. Generalized  osteopenia. Treatment: admission, monitor, surgery: HIP HEMIARTHROPLASTY RIGHT, labs,  Options provided:  -- Pathological fracture of right femoral neck  -- Osteoporotic right femoral neck fracture  -- Osteoporotic right femoral neck fracture following fall which would not   usually break a normal, healthy bone  -- Traumatic right femoral neck fracture  -- Other - I will add my own diagnosis  -- Disagree - Not applicable / Not valid  -- Disagree - Clinically unable to determine / Unknown  -- Refer to Clinical Documentation Reviewer    PROVIDER RESPONSE TEXT:    This patient has a traumatic fracture of right femoral neck .     Query created by: Anh Lino on 2021 1:19 PM      Electronically signed by:  Gennette Najjar PA-C 3/1/2021 12:04 PM

## 2021-03-01 NOTE — H&P
This is a late entry note. The patient's history and physical was conducted earlier today, but was documented in the account for her acute surgical stay.

## 2021-03-01 NOTE — PROGRESS NOTES
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 No chills or cough. She did not mention the nausea that evidently bothered her this morning per nursing notes. Prior (baseline) level of function: Independent. Current level of function:         Current  IRF-MUSTAPHA and Goals:   Occupational Therapy:      :     :                                       Eating:         Oral Hygiene:      UB/LB Bathing:      UB Dressing:           LB Dressing:      [place] Footwear: Toileting: Toilet Transfers:   Toilet Transfer  Assistance Needed: Partial/moderate assistance(Min A using grab bars)  Comment: (gb used )  CARE Score: 3    Physical Therapy:                Bed Mobility:   Sit to Lying  Assistance Needed: Supervision or touching assistance(SBA-Sup; pt required 2 attempts to complete transfer; actively lifted RLE with increased time and effort)  CARE Score: 4  Roll Left and Right  Assistance Needed: Partial/moderate assistance(Rolling to R Min A; SBA to L with additional set-up assist of pillow between thighs to avoid adduction of R hip beyond midline)  CARE Score: 3  Discharge Goal: Independent  Lying to Sitting on Side of Bed  Assistance Needed: Supervision or touching assistance(SBA-Sup without use of bed features; required VCs not to internally rotate R hip as she moved her RLE off the bed; once transitioned to sitting pt had spinning sensation requiring CGA on trunk to maintain postural stability)  CARE Score: 4  Discharge Goal: Independent    Transfers:    Sit to Stand  Assistance Needed: Partial/moderate assistance(Min A using RW; VCs on proper hand placement as pt immediately wanted to pull onto RW)  CARE Score: 3  Chair/Bed-to-Chair Transfer  Assistance Needed: Partial/moderate assistance(Min A using RW; required VCs on hand placement to armrests of w/c)  CARE Score: 3  Toilet Transfer  Assistance Needed: Partial/moderate assistance(Min A using grab bars)  CARE Score: 3       Ambulation:                                                      Wheelchair:  w/c Ability:                  Balance:        Object:      I      Exam:    Blood pressure (!) 156/68, pulse 103, temperature 97.6 °F (36.4 °C), temperature source Oral, resp. rate 16, height 5' 2.99\" (1.6 m), weight 110 lb 12.8 oz (50.3 kg), SpO2 95 %. General: Seen reclined in bed. Alert and talkative. Slightly hard of hearing. In no distress. HEENT: Mucous membranes are dry. Neck supple. Speech clear. Pulmonary: Unlabored and clear. Cardiac: RRR with soft systolic murmur. Abdomen: Patient's abdomen is soft and nondistended. Bowel sounds were present throughout. There was no rebound, guarding or masses noted. Upper extremities: Slow and deliberate arm movements but a full active range of motion was noted. Strong . Lower extremities: Very limited right hip and knee movements. Calves are soft. Guarded left ankle dorsiflexion through an incomplete range of motion as well. Dysesthesias over the lateral lower leg on the left. Sitting balance was good. Standing balance was poor.     Lab Results   Component Value Date    WBC 5.2 02/27/2021    HGB 9.9 (L) 02/27/2021    HCT 31.7 (L) 02/27/2021    MCV 95.8 02/27/2021     02/27/2021     Lab Results   Component Value Date    INR 0.91 02/22/2021    PROTIME 11.0 (L) 02/22/2021     Lab Results   Component Value Date    CREATININE 1.0 02/28/2021    BUN 25 (H) 02/28/2021     (L) 02/28/2021    K 4.7 02/28/2021    CL 98 (L) 02/28/2021    CO2 29 02/28/2021     Lab Results   Component Value Date     (H) 02/23/2021     (H) 02/23/2021    ALKPHOS 135 (H) 02/23/2021    BILITOT 1.2 (H) 02/23/2021       Expected length of stay  prior to a supervised level of function for discharge home with a walker and C OT/PT is 2 weeks. Recommendations:    1. Right femoral neck fracture with hemiarthroplasty: Developing the routine for her daily occupational and physical therapy. Emphasizing pulmonary hygiene, DVT prophylaxis and cautious pain management. Nutritional support. Bowel and bladder retraining. We must accommodate her difficulty with left leg use due to her sciatica when training her on hip precautions. Adaptive equipment education. Verbal cues and moderate physical assistance for transfers today. 2. DVT prophylaxis: Lovenox 30 mg subcu daily. I must monitor her hemoglobin and platelet count periodically while on this medication. Weightbearing activities are pursued daily. GI prophylaxis offered. No clinical signs of acute blood loss. 3. Uncontrolled hypertension: The patient requires Cardizem, Catapres, Cozaar and hydrochlorothiazide. Target systolic blood pressure is 120-140. Vital signs are checked at rest and with activity. Consistent oral hydration is encouraged. A.m. meds were held by nursing due to emesis. We will likely need to give some of them here over the lunch hour. 4. Obstipation: The patient had painful distention of her abdomen upon admission to rehab. Stat x-ray showed appropriate gas patterns and no signs of obstruction. She received suppository and has had 3 bowel movements over the last 18 hours now. We need to be consistent with her oral bowel meds now. 5. Lumbar DDD with sciatica: Diathermy, therapeutic exercises and careful adjustments of gait training to accommodate her foot drop. 6. Nausea with emesis: Zofran is available. Regular bowel intervention.   We may need to adjust her analgesics if this continues.

## 2021-03-02 LAB
EKG ATRIAL RATE: 78 BPM
EKG DIAGNOSIS: NORMAL
EKG P AXIS: 75 DEGREES
EKG P-R INTERVAL: 146 MS
EKG Q-T INTERVAL: 390 MS
EKG QRS DURATION: 68 MS
EKG QTC CALCULATION (BAZETT): 444 MS
EKG R AXIS: -9 DEGREES
EKG T AXIS: 38 DEGREES
EKG VENTRICULAR RATE: 78 BPM
GLUCOSE BLD-MCNC: 137 MG/DL (ref 70–99)

## 2021-03-02 PROCEDURE — 1280000000 HC REHAB R&B

## 2021-03-02 PROCEDURE — 97535 SELF CARE MNGMENT TRAINING: CPT

## 2021-03-02 PROCEDURE — 99232 SBSQ HOSP IP/OBS MODERATE 35: CPT | Performed by: PHYSICAL MEDICINE & REHABILITATION

## 2021-03-02 PROCEDURE — 6370000000 HC RX 637 (ALT 250 FOR IP): Performed by: PHYSICAL MEDICINE & REHABILITATION

## 2021-03-02 PROCEDURE — 6360000002 HC RX W HCPCS: Performed by: INTERNAL MEDICINE

## 2021-03-02 PROCEDURE — 6370000000 HC RX 637 (ALT 250 FOR IP): Performed by: INTERNAL MEDICINE

## 2021-03-02 PROCEDURE — 94761 N-INVAS EAR/PLS OXIMETRY MLT: CPT

## 2021-03-02 PROCEDURE — 97110 THERAPEUTIC EXERCISES: CPT

## 2021-03-02 PROCEDURE — 97530 THERAPEUTIC ACTIVITIES: CPT

## 2021-03-02 PROCEDURE — 94150 VITAL CAPACITY TEST: CPT

## 2021-03-02 PROCEDURE — 82962 GLUCOSE BLOOD TEST: CPT

## 2021-03-02 PROCEDURE — 97542 WHEELCHAIR MNGMENT TRAINING: CPT

## 2021-03-02 PROCEDURE — 51798 US URINE CAPACITY MEASURE: CPT

## 2021-03-02 RX ADMIN — OXYCODONE AND ACETAMINOPHEN 1 TABLET: 5; 325 TABLET ORAL at 06:04

## 2021-03-02 RX ADMIN — OXYCODONE AND ACETAMINOPHEN 1 TABLET: 5; 325 TABLET ORAL at 20:54

## 2021-03-02 RX ADMIN — POLYETHYLENE GLYCOL (3350) 17 G: 17 POWDER, FOR SOLUTION ORAL at 20:52

## 2021-03-02 RX ADMIN — ENOXAPARIN SODIUM 30 MG: 30 INJECTION SUBCUTANEOUS at 10:59

## 2021-03-02 RX ADMIN — GABAPENTIN 100 MG: 100 CAPSULE ORAL at 20:52

## 2021-03-02 RX ADMIN — DILTIAZEM HYDROCHLORIDE 240 MG: 240 CAPSULE, COATED, EXTENDED RELEASE ORAL at 10:58

## 2021-03-02 RX ADMIN — ASPIRIN 81 MG: 81 TABLET, COATED ORAL at 10:58

## 2021-03-02 RX ADMIN — SENNOSIDES 8.6 MG: 8.6 TABLET, FILM COATED ORAL at 20:54

## 2021-03-02 RX ADMIN — GABAPENTIN 100 MG: 100 CAPSULE ORAL at 10:58

## 2021-03-02 RX ADMIN — CLONIDINE HYDROCHLORIDE 0.1 MG: 0.1 TABLET ORAL at 10:58

## 2021-03-02 RX ADMIN — LOSARTAN POTASSIUM 50 MG: 50 TABLET, FILM COATED ORAL at 10:58

## 2021-03-02 RX ADMIN — DOCUSATE SODIUM 100 MG: 100 CAPSULE, LIQUID FILLED ORAL at 10:58

## 2021-03-02 RX ADMIN — CLONIDINE HYDROCHLORIDE 0.1 MG: 0.1 TABLET ORAL at 20:52

## 2021-03-02 ASSESSMENT — PAIN DESCRIPTION - FREQUENCY
FREQUENCY: CONTINUOUS

## 2021-03-02 ASSESSMENT — PAIN DESCRIPTION - LOCATION
LOCATION: VAGINA

## 2021-03-02 ASSESSMENT — PAIN DESCRIPTION - DESCRIPTORS
DESCRIPTORS: DISCOMFORT
DESCRIPTORS: DISCOMFORT

## 2021-03-02 ASSESSMENT — PAIN DESCRIPTION - ORIENTATION
ORIENTATION: RIGHT
ORIENTATION: RIGHT

## 2021-03-02 ASSESSMENT — PAIN SCALES - GENERAL
PAINLEVEL_OUTOF10: 7
PAINLEVEL_OUTOF10: 7

## 2021-03-02 ASSESSMENT — PAIN DESCRIPTION - PAIN TYPE: TYPE: CHRONIC PAIN

## 2021-03-02 ASSESSMENT — PAIN DESCRIPTION - PROGRESSION: CLINICAL_PROGRESSION: NOT CHANGED

## 2021-03-02 ASSESSMENT — PAIN DESCRIPTION - ONSET: ONSET: ON-GOING

## 2021-03-02 NOTE — PROGRESS NOTES
Physical Rehabilitation: OCCUPATIONAL THERAPY     [x] daily progress note       [] discharge       Patient Name:  Shaji Leonardo   :  1923 MRN: [id number]  Room:  [address] [place] [address] Date of Admission: 2021  Rehabilitation Diagnosis:   Fracture of unspecified part of neck of right femur, initial encounter for closed fracture [S72.001A]  Closed displaced fracture of right femoral neck ([address] [S72.001A]       Date 3/2/2021       Day of ARU Week:  3   Time IN/OUT 1250/1333   Individual Tx Minutes 43   Group Tx Minutes    Co-Treat Minutes    Concurrent Tx Minutes    TOTAL Tx Time Mins 43   Variance Time -17   Variance Time []   Refusal due to:     []   Medical hold/reason:    []   Illness   []   Off Unit for test/procedure  []   Extra time needed to complete task  []   Therapeutic need  [x]   Other (specify): patient unable to complete session secondary to lethargy and fatigue    Restrictions Restrictions/Precautions: Fall Risk, Weight Bearing, ROM Restrictions     Hip Precautions: No hip flexion > 90 degrees, No hip internal rotation, No ADduction, Posterior hip precautions   Communication with other providers: [x]   OK to see per nursing:     []   Spoke with team member regarding:      Subjective observations and cognitive status: Patient supine in bed upon therapist entering, pleasant and agreeable to therapy stating she will try to do whatever she can,       Pain level/location:   6 /10       Location: R hip    Discharge recommendations  Anticipated discharge date:  TBD  Destination: []home alone   []home alone w assist prn   [] home w/ family    [] Continuous supervision       []SNF    [] Assisted living     [] Other:   Continued therapy: [x]HHC OT  []OUTPATIENT  OT   [] No Further OT  Equipment needs: TBD         Bed Mobility:           []   Pt received out of bed   Rolling R/L:    Scooting:  Sup  Supine --> Sit:  Sup  Sit --> Supine:  SBA     Transfers:    Sit--> Stand:  Min A  Stand --> Sit: CGA  Stand-Pivot:   Min A   Other:    Assistive device required for transfer:   RW      Functional Mobility:    Down to therapy gym from room at wc level, patient lethargic unable to stay awake following wc mobility; states she is feeling nauseas   Assistance:  Min A for obstacles   Device:   []   Rolling Walker     []   Standard Walker [x]   Wheelchair        []   [place]       []   4-Wheeled Walker         []   Cardiac Walker       []   Other:        Additional Therapeutic activities/exercises completed this date:     []   ADL Training   [x]   Balance/Postural training     [x]   Bed/Transfer Training   [x]   Endurance Training patient attempts to complete Bilateral reciprocal patterned movement this date, completes approx 2 minutes at 30 sec intervals however continues to nod off secondary to increased lethargy and fatigue patient unable to continue    []   Neuromuscular Re-ed   []   Nu-step:  Time:        Level:         #Steps:       []   Rebounder:    []  Seated     []  Standing        []   Supine Ther Ex (reps/sets):     []   Seated Ther Ex (reps/sets):     []   Standing Ther Ex (reps/sets):     []   Other:      Comments: patient requires sitting at EOB from supine for several minutes prior to transfer as patient c/o nausea and dizziness, spoke with nurses aide at end of session, aide states she could barely stay awake through lunch      Patient/Caregiver Education and Training:   [x]   Adaptive Equipment Use  [x]   Bed Mobility/Transfer Technique/Safety  [x]   Energy Conservation Tips  []   Family training  [x]   Postural Awareness  [x]   Safety During Functional Activities  [x]   Reinforced Patient's Precautions   []   Progress was updated and reviewed in Rehabtracker with patient and/or family this         date.     Treatment Plan for Next Session: POC to continue as tolerated       Assessment:        Treatment/Activity Tolerance:   [x] Tolerated treatment with no adverse effects    [] Patient limited by fatigue  [] Patient limited by pain   [] Patient limited by medical complications:    [] Adverse reaction to Tx:   [] Significant change in status    Safety:       [x]  bed alarm set    []  chair alarm set    []  Pt refused alarms                []  Telesitter activated      [x]  Gait belt used during tx session      []other:       Number of Minutes/Billable Intervention  Therapeutic Exercise    ADL Self-care    Neuro Re-Ed    Therapeutic Activity 43   Group    Other:    TOTAL 43       Social History  Social/Functional History  Lives With: Alone  Type of Home: (Pt reports living at [facility])  Home Layout: One level  Home Access: Stairs to enter without rails  Entrance Stairs - Number of Steps: threshold step at the door (garage entrance)  Bathroom Shower/Tub: Tub/Shower unit, Walk-in shower, Shower chair with back(soaked in tub at baseline)  Bathroom Toilet: Standard  Bathroom Equipment: Toilet raiser  Bathroom Accessibility: (2nd bathroom c walk-in shower is w/c/fww. However pt reports use of tu/shower bathroom and is not w/c and walker accessible.)  Home Equipment: Cane  ADL Assistance: Independent  Homemaking Responsibilities: Yes  Meal Prep Responsibility: Primary  Laundry Responsibility: Primary  Cleaning Responsibility: Primary  Bill Paying/Finance Responsibility: Primary(paige Vance Reasonaleksander)  is POA)  Shopping Responsibility: Primary  Dependent Care Responsibility: No  Health Care Management: Primary  Ambulation Assistance: Independent  Transfer Assistance: Independent  Active : No  Patient's  Info: Pt uses [employer] [facility] [employer]  Mode of Transportation: Bus  Occupation: Retired  Leisure & Hobbies: Pt reports being very busy however enjoys painting: water color and oil painting. Pt also enjoys reading. Pt also reports enjoying walks however d/t balance concerns pt has stopped.   Additional Comments: sleeps in regular,  flat bed; ~3 falls in the past year with recent fall requiring hospitalization due to injury    Objective                                                                                    Goals:  (Update in navigator)  Short term goals  Time Frame for Short term goals: STG=LTG:  Long term goals  Time Frame for Long term goals : 7-10 days or until d/c  Long term goal 1: Pt will complete feeding/grooming/oral care task c MOD I by d/c  Long term goal 2: Pt will complete total body bathing c MOD I by d/c  Long term goal 3: Pt will complete total body dressing (UB/LB/Footwear) c MOD I by d/c  Long term goal 4: Pt will complete toileting c MOD I by d/c  Long term goal 5: Pt will complete functional transfer (toilet, tub, shower) c MOD I by d/c. Long term goals 6: Pt will perform therex/therax to facilitate increased strength/endurance/ax tolerance (c emphasis on dynamic standing balance/tolerance >10 mins and BUE endurance) c MOD I in order to complete ADLs. :        Plan of Care                                                                              Times per week: 5 days per week for a minimum of 60 minutes/day plus group as appropriate for 60 minutes.   Treatment to include Plan  Times per day: Daily  Current Treatment Recommendations: Strengthening, Patient/Caregiver Education & Training, Equipment Evaluation, Education, & procurement, Self-Care / ADL, Pain Management, Balance Training, Home Management Training, Functional Mobility Training, Safety Education & Training, ROM    Electronically signed by   BERTRAND Aly  3/2/2021, 7:38 AM

## 2021-03-02 NOTE — PROGRESS NOTES
Physical Therapy    [x] daily progress note       [] discharge       Patient Name:  Caprice Menon   :  1923 MRN: [id number]  Room:  [address] [place] [address] Date of Admission: 2021  Rehabilitation Diagnosis:   Fracture of unspecified part of neck of right femur, initial encounter for closed fracture [S72.001A]  Closed displaced fracture of right femoral neck ([address] [S72.001A]       Date 3/2/2021       Day of ARU Week:  3   Time IN/OUT 1000/1051   Individual Tx Minutes 51   Group Tx Minutes    Co-Treat Minutes    Concurrent Tx Minutes    TOTAL Tx Time Mins 51   Variance Time -9   Variance Time []   Refusal due to:     []   Medical hold/reason:    [x]   Illness: increased lethargy    []   Off Unit for test/procedure  []   Extra time needed to complete task  []   Therapeutic need  []   Other (specify):   Restrictions Restrictions/Precautions  Restrictions/Precautions: Fall Risk, Weight Bearing, ROM Restrictions  Position Activity Restriction  Hip Precautions: No hip flexion > 90 degrees, No hip internal rotation, No ADduction, Posterior hip precautions   Interdisciplinary communication [x]   Cleared for therapy per nursing     [x]   RN notified about issues during session  [x]   RN updated on pt performance   []   Spoke with   []   Spoke with OT  []   Spoke with MD  []   Other:    Subjective observations and cognitive status: Pt seated at EOB with RN assessing pt due to episode during OT session earlier this AM. Facial tremors observed. \"I don't think I can make it. I just don't know what's wrong with me. \" Pt cried towards the end of this tx session stating her frustration of not knowing what's wrong with her stating \"It's probably the medications they're giving me. \"     Pain level/location: 6/10       Location: R hip    Discharge recommendations  Anticipated discharge date:  TBD  Destination: []home alone   []home alone with assist PRN     [] home w/ family      [] Continuous supervision  []SNF    [] technique  [x]   Reinforced patient's precautions/mobility while maintaining precautions  []   Postural awareness  []   Family/caregiver training  []   Progress was updated and reviewed in Rehabtracker with patient and/or family this date. []   Other:    Treatment Plan for Next Session:  Gait training using RW with close monitoring of Sx      Assessment:  Pt was able to recall and verbalize 2 out of the 3 posterior hip precautions and required prompting to recall no R hip internal rotation. Pt became increasingly lethargic as this tx session progressed that significantly affected her activity tolerance and ability to focus on required tasks. Pt had 2 episodes of decreased problem solving when turning during initial transfer and did not position her R hand properly on the RW with the last transfer that may be due to her increased lethargy. She remains to be oriented x 4 as assessed today. Treatment/Activity Tolerance:   [] Tolerated treatment with no adverse effects    [x] Patient limited by fatigue  [] Patient limited by pain   [x] Patient limited by medical complications: spinning sensation/dizziness, lethargy   [] Adverse reaction to Tx:   [] Significant change in status    Barrier/s to progress/learning:   []   None  []   Cognition  []   Hearing deficit  []   Pre-morbid mental/psychological status   []   Motivation  []   Communication  []   Anxiety  [x]   Vision deficit  []   Attention  [x]   Other: vestibular deficit?     Safety:       [x]  bed alarm set    []  chair alarm set    []  Pt refused alarms                []  Telesitter activated      [x]  Gait belt used during tx session      [x]other: pt left in bed under RN's care      Number of Minutes/Billable Intervention  Gait Training    Therapeutic Exercise 15   Neuro Re-Ed    Therapeutic Activity 15   Wheelchair Propulsion 21   Group    Other:    TOTAL 51       Social History  Social/Functional History  Lives With: Alone  Type of Home: (Pt reports will ascend/descend curb step using RW with CGA. Short term goal 5: Pt will complete object retrieval from the floor with 1UE support on RW and using reacher Mod Ind.:   :        Plan of Care                                                                              Times per week: 5 days per week for a minimum of 60 minutes/day plus group as appropriate for 60 minutes.   Treatment to include Current Treatment Recommendations: Strengthening, Gait Training, Patient/Caregiver Education & Training, ROM, Equipment Evaluation, Education, & procurement, Balance Training, Pain Management, Functional Mobility Training, Endurance Training, Home Exercise Program, Positioning, Transfer Training, Safety Education & Training, Wheelchair Mobility Training    Electronically signed by   Miryam Diaz, PT  3/2/2021, 10:09 AM

## 2021-03-02 NOTE — PROGRESS NOTES
Occupational Therapy  Physical Rehabilitation: OCCUPATIONAL THERAPY     [x] daily progress note       [] discharge       Patient Name:  Gerhardt Schmid   :  1923 MRN: [id number]  Room:  [address] [place] [address] Date of Admission: 2021  Rehabilitation Diagnosis:   Fracture of unspecified part of neck of right femur, initial encounter for closed fracture [S72.001A]  Closed displaced fracture of right femoral neck ([address] [S72.001A]       Date 3/2/2021       Day of ARU Week:  3   Time IN/- 935   Individual Tx Minutes    Group Tx Minutes    Co-Treat Minutes    Concurrent Tx Minutes    TOTAL Tx Time Mins 67   Variance Time +7   Variance Time []   Refusal due to:     []   Medical hold/reason:    []   Illness   []   Off Unit for test/procedure  []   Extra time needed to complete task  [x]   Therapeutic need medical issue toward end of session  []   Other (specify):   Restrictions Restrictions/Precautions  Restrictions/Precautions: Fall Risk, Weight Bearing, ROM Restrictions  Position Activity Restriction  Hip Precautions: No hip flexion > 90 degrees, No hip internal rotation, No ADduction, Posterior hip precautions   Communication with other providers: [x]   OK to see per nursing:     [x]   Spoke with team member regarding: Following toileting task, Pt with LOC from seated base in w/c. Pt demo'ed uncontrolled forward flexed posture with KIMBALL manually holding Pt in upright base. Pt non responsive to name being called x5-8 seconds. Pt came to, immediately began c/o'ing nausea. Pt with uncontrolled shaking of head and slight dysarthia. Nrsg arrived and obtained vitals (see flow sheet). Pt A'ed back to bed and Pt's arousal increased, however slight tremoring and dysarthric speech remained. Pt left in the care of nursing staff. Subjective observations and cognitive status: Pt just finishing morning meal upon COTAs arrival. Pt pleasant, reports a good night of sleep. Pt agreeable to txmnt session.     Pain level/location:    /10       Location: \"discomfort\" not pain    Discharge recommendations  Anticipated discharge date:  TBD  Destination: []home alone   []home alone w assist prn [] home w/ family    [] Continuous supervision       []SNF            [] Assisted living     [] Other:   Continued therapy: []HHC OT  []OUTPATIENT  OT   [] No Further OT  Equipment needs: TBD   (HIT F2 to transition between stars)    Eating/Feeding:    S/u incl opening containers   Extremity Used:        [x]    R UE []    L UE         Dentures: []   N/A    [x]    Partial []    Full  Adaptive Equipment Used: Toileting:   Min A for CM and safety during task       Toilet Transfers:   Min A for safety, required 2 attempts for sit<>stand d/t dizziness   Device Used:    [x]   Standard Toilet         []   Grab Bars           []  Bedside Commode       []   Elevated Toilet          []   Other:               Bed Mobility:           []   Pt received out of bed   Rolling R/L:  DNT  Scooting:    Supine --> Sit:  SBA with slight education r/t body mechanics and sequencing of movement   Sit --> Supine: Mod A r/t decreased arousal and medical event. Pt was able to A with task, but not fully     Transfers:    Sit--> Stand:  Min A   Stand --> Sit:   Min A   Stand-Pivot:   Min A. Mod A w/c>bed during medical event    Other:    Assistive device required for transfer:   RW      Functional Mobility:    Assistance:  SBA with min cues for form and follow through.  W/c mob completed to increase ax katia and UB strength   Device:   []   Rolling Walker     []   Standard Walker [x]   Wheelchair        []   Claus Zamarripa       []   Maryln Snellen         []   Cardiac Walker       []   Other:              Additional Therapeutic activities/exercises completed this date:     []   ADL Training   [x]   Balance/Postural training     [x]   Bed/Transfer Training   [x]   Endurance Training   []   Neuromuscular Re-ed   []   Nu-step:  Time:        Level:         #Steps: []   Rebounder:    []  Seated     []  Standing        []   Supine Ther Ex (reps/sets):     [x]   Seated Ther Ex (reps/sets):  Pt engaged in UB strengthening tasks incl UBE x5 mins, use of 1# dowel to B kaia, elbows x12/2 with cues for termination at end of reps and occasional cues for form throughout. Pt demo's s/s of fatigue, however Pt does not readily terminate task to take a rest. Therefore, education provided in re to resting PRN throughout multi step and complex tasks . []   Standing Ther Ex (reps/sets):     [x]   Other: Standing katia task with Pt requiring x20 secs to \"clear head\" upon ea stand attempt. Pt demo'ed x1 minor LOB with min A to correct. Pt able to stand x60 seconds over x3 attempts with RUE releasing RW to reach for objects slightly out of DIONNE. Comments:      Patient/Caregiver Education and Training:   []   [employer] Equipment Use  [x]   Bed Mobility/Transfer Technique/Safety  [x]   Energy Conservation Tips  []   Family training  [x]   Postural Awareness  []   Safety During Functional Activities  []   Reinforced Patient's Precautions   []   Progress was updated and reviewed in Rehabtracker with patient and/or family this         date. Treatment Plan for Next Session: Cont working with Pt on increasing strength, safety, and I'ance with tasks     Assessment:  Pt demo'ed P katia to session this date r/t dizziness and LOC at end of session.  Recommendation is to monitor vitals closely during txmnt and enforce built in rest breaks throughout tasks       Treatment/Activity Tolerance:   [x] Tolerated treatment with no adverse effects    [] Patient limited by fatigue  [] Patient limited by pain   [] Patient limited by medical complications:    [] Adverse reaction to Tx:   [] Significant change in status    Safety:       [x]  bed alarm set    []  chair alarm set    []  Pt refused alarms                []  Telesitter activated      [x]  Gait belt used during tx session      []other:  Bishnu Pace d/c  Long term goal 1: Pt will complete feeding/grooming/oral care task c MOD I by d/c  Long term goal 2: Pt will complete total body bathing c MOD I by d/c  Long term goal 3: Pt will complete total body dressing (UB/LB/Footwear) c MOD I by d/c  Long term goal 4: Pt will complete toileting c MOD I by d/c  Long term goal 5: Pt will complete functional transfer (toilet, tub, shower) c MOD I by d/c. Long term goals 6: Pt will perform therex/therax to facilitate increased strength/endurance/ax tolerance (c emphasis on dynamic standing balance/tolerance >10 mins and BUE endurance) c MOD I in order to complete ADLs. :        Plan of Care                                                                              Times per week: 5 days per week for a minimum of 60 minutes/day plus group as appropriate for 60 minutes.   Treatment to include Plan  Times per day: Daily  Current Treatment Recommendations: Strengthening, Patient/Caregiver Education & Training, Equipment Evaluation, Education, & procurement, Self-Care / ADL, Pain Management, Balance Training, Home Management Training, Functional Mobility Training, Safety Education & Training, ROM    Electronically signed by   BERTRAND Herzog [id number]  3/2/2021, 8:30 AM

## 2021-03-03 LAB
ANION GAP SERPL CALCULATED.3IONS-SCNC: 5 MMOL/L (ref 4–16)
BASOPHILS ABSOLUTE: 0 K/CU MM
BASOPHILS RELATIVE PERCENT: 0.1 % (ref 0–1)
BUN BLDV-MCNC: 26 MG/DL (ref 6–23)
CALCIUM SERPL-MCNC: 8.4 MG/DL (ref 8.3–10.6)
CHLORIDE BLD-SCNC: 100 MMOL/L (ref 99–110)
CO2: 31 MMOL/L (ref 21–32)
CREAT SERPL-MCNC: 1.1 MG/DL (ref 0.6–1.1)
DIFFERENTIAL TYPE: ABNORMAL
EOSINOPHILS ABSOLUTE: 0.1 K/CU MM
EOSINOPHILS RELATIVE PERCENT: 0.7 % (ref 0–3)
GFR AFRICAN AMERICAN: 56 ML/MIN/1.73M2
GFR NON-AFRICAN AMERICAN: 46 ML/MIN/1.73M2
GLUCOSE BLD-MCNC: 136 MG/DL (ref 70–99)
HCT VFR BLD CALC: 32.1 % (ref 37–47)
HEMOGLOBIN: 9.8 GM/DL (ref 12.5–16)
IMMATURE NEUTROPHIL %: 1 % (ref 0–0.43)
LYMPHOCYTES ABSOLUTE: 1.2 K/CU MM
LYMPHOCYTES RELATIVE PERCENT: 16.3 % (ref 24–44)
MAGNESIUM: 2 MG/DL (ref 1.8–2.4)
MCH RBC QN AUTO: 29.6 PG (ref 27–31)
MCHC RBC AUTO-ENTMCNC: 30.5 % (ref 32–36)
MCV RBC AUTO: 97 FL (ref 78–100)
MONOCYTES ABSOLUTE: 0.4 K/CU MM
MONOCYTES RELATIVE PERCENT: 5 % (ref 0–4)
NUCLEATED RBC %: 0 %
PDW BLD-RTO: 16 % (ref 11.7–14.9)
PHOSPHORUS: 3.6 MG/DL (ref 2.5–4.9)
PLATELET # BLD: 250 K/CU MM (ref 140–440)
PMV BLD AUTO: 9.9 FL (ref 7.5–11.1)
POTASSIUM SERPL-SCNC: 4.7 MMOL/L (ref 3.5–5.1)
RBC # BLD: 3.31 M/CU MM (ref 4.2–5.4)
SEGMENTED NEUTROPHILS ABSOLUTE COUNT: 5.5 K/CU MM
SEGMENTED NEUTROPHILS RELATIVE PERCENT: 76.9 % (ref 36–66)
SODIUM BLD-SCNC: 136 MMOL/L (ref 135–145)
TOTAL IMMATURE NEUTOROPHIL: 0.07 K/CU MM
TOTAL NUCLEATED RBC: 0 K/CU MM
VITAMIN D 25-HYDROXY: 27.22 NG/ML
WBC # BLD: 7.2 K/CU MM (ref 4–10.5)

## 2021-03-03 PROCEDURE — 99232 SBSQ HOSP IP/OBS MODERATE 35: CPT | Performed by: PHYSICAL MEDICINE & REHABILITATION

## 2021-03-03 PROCEDURE — 85025 COMPLETE CBC W/AUTO DIFF WBC: CPT

## 2021-03-03 PROCEDURE — 97530 THERAPEUTIC ACTIVITIES: CPT

## 2021-03-03 PROCEDURE — 36415 COLL VENOUS BLD VENIPUNCTURE: CPT

## 2021-03-03 PROCEDURE — 97535 SELF CARE MNGMENT TRAINING: CPT

## 2021-03-03 PROCEDURE — 80048 BASIC METABOLIC PNL TOTAL CA: CPT

## 2021-03-03 PROCEDURE — 97116 GAIT TRAINING THERAPY: CPT

## 2021-03-03 PROCEDURE — 6360000002 HC RX W HCPCS: Performed by: INTERNAL MEDICINE

## 2021-03-03 PROCEDURE — 97110 THERAPEUTIC EXERCISES: CPT

## 2021-03-03 PROCEDURE — 83735 ASSAY OF MAGNESIUM: CPT

## 2021-03-03 PROCEDURE — 94761 N-INVAS EAR/PLS OXIMETRY MLT: CPT

## 2021-03-03 PROCEDURE — 1280000000 HC REHAB R&B

## 2021-03-03 PROCEDURE — 94150 VITAL CAPACITY TEST: CPT

## 2021-03-03 PROCEDURE — 84100 ASSAY OF PHOSPHORUS: CPT

## 2021-03-03 PROCEDURE — 92610 EVALUATE SWALLOWING FUNCTION: CPT

## 2021-03-03 PROCEDURE — 82306 VITAMIN D 25 HYDROXY: CPT

## 2021-03-03 PROCEDURE — 6370000000 HC RX 637 (ALT 250 FOR IP): Performed by: PHYSICAL MEDICINE & REHABILITATION

## 2021-03-03 PROCEDURE — 6370000000 HC RX 637 (ALT 250 FOR IP): Performed by: INTERNAL MEDICINE

## 2021-03-03 RX ADMIN — GABAPENTIN 100 MG: 100 CAPSULE ORAL at 09:31

## 2021-03-03 RX ADMIN — ASPIRIN 81 MG: 81 TABLET, COATED ORAL at 09:31

## 2021-03-03 RX ADMIN — ACETAMINOPHEN 650 MG: 325 TABLET ORAL at 01:59

## 2021-03-03 RX ADMIN — DOCUSATE SODIUM 100 MG: 100 CAPSULE, LIQUID FILLED ORAL at 09:32

## 2021-03-03 RX ADMIN — CLONIDINE HYDROCHLORIDE 0.1 MG: 0.1 TABLET ORAL at 09:31

## 2021-03-03 RX ADMIN — POLYETHYLENE GLYCOL (3350) 17 G: 17 POWDER, FOR SOLUTION ORAL at 09:31

## 2021-03-03 RX ADMIN — POLYETHYLENE GLYCOL (3350) 17 G: 17 POWDER, FOR SOLUTION ORAL at 21:44

## 2021-03-03 RX ADMIN — ENOXAPARIN SODIUM 30 MG: 30 INJECTION SUBCUTANEOUS at 09:32

## 2021-03-03 RX ADMIN — CLONIDINE HYDROCHLORIDE 0.1 MG: 0.1 TABLET ORAL at 21:43

## 2021-03-03 RX ADMIN — SENNOSIDES 8.6 MG: 8.6 TABLET, FILM COATED ORAL at 21:44

## 2021-03-03 RX ADMIN — LOSARTAN POTASSIUM 50 MG: 50 TABLET, FILM COATED ORAL at 09:31

## 2021-03-03 RX ADMIN — DILTIAZEM HYDROCHLORIDE 240 MG: 240 CAPSULE, COATED, EXTENDED RELEASE ORAL at 09:31

## 2021-03-03 RX ADMIN — GABAPENTIN 100 MG: 100 CAPSULE ORAL at 21:43

## 2021-03-03 ASSESSMENT — PAIN DESCRIPTION - DESCRIPTORS: DESCRIPTORS: DISCOMFORT

## 2021-03-03 ASSESSMENT — PAIN DESCRIPTION - FREQUENCY: FREQUENCY: INTERMITTENT

## 2021-03-03 ASSESSMENT — PAIN DESCRIPTION - PAIN TYPE: TYPE: ACUTE PAIN

## 2021-03-03 ASSESSMENT — PAIN DESCRIPTION - LOCATION: LOCATION: HEAD

## 2021-03-03 NOTE — PATIENT CARE CONFERENCE
ACUTE REHAB TEAM CONFERENCE SUMMARY   [facility]    NAME: Lonnie Villatoro  : 1923 ADMIT DATE: 2021    Rehab Admitting Dx: Fracture of unspecified part of neck of right femur, initial encounter for closed fracture [S72.001A]  Closed displaced fracture of right femoral neck ([address] [S72.001A]  Patient Comorbid Conditions: Active Hospital Problems    Diagnosis Date Noted    DDD (degenerative disc disease), lumbar [M51.36]     Sciatica, left side [M54.32]     Closed displaced fracture of right femoral neck ([address] [S72.001A] 2021    Uncontrolled hypertension [I10]      Date: 3/4/2021    CASE MANAGEMENT  Current issues/needs regarding patient and family discharge status:   Patient plans d/c to [address] [place] [address]. Will hire additional services if needed. If patient isn't  [employer] will want to skill at discharge. PHYSICAL THERAPY (Updated in QI)  Short term goals  Time Frame for Short term goals: 10 tx days:  Short term goal 1: Pt will complete bed mobility tasks (rolling L/R, scooting, and sup<->sit) Ind following posterior hip precautions. Short term goal 2: Pt will complete OOB transfers using RW Mod Ind. Short term goal 3: Pt will ambulate 150 ft using RW over level surface and 10 ft over carpeted/transitional surface with SBA. Short term goal 4: Pt will ascend/descend curb step using RW with CGA. Short term goal 5: Pt will complete object retrieval from the floor with 1UE support on RW and using reacher Mod Ind. Short term goal 6: Pt will propel manual w/c >/=150 ft Mod Ind. Impairments/deficits, barriers:     Body structures, Functions, Activity limitations: Decreased functional mobility , Decreased high-level IADLs, Decreased ADL status, Decreased endurance, Decreased ROM, Decreased strength, Decreased balance, Vestibular Impairment, Increased pain, Decreased vision/visual deficit     Prognosis: Good  Decision Making: High Complexity  Clinical Presentation: unstable/unpredictable characteristics  Equipment needed at discharge: RW and manual w/c      PT IRF-MUSTAPHA scores since initial assessment  Bed Mobility:   Sit to Lying  Assistance Needed: Supervision or touching assistance  Comment: Sup. CARE Score: 4  Discharge Goal: Independent    Roll Left and Right  Assistance Needed: Partial/moderate assistance(Rolling to R Min A; SBA to L with additional set-up assist of pillow between thighs to avoid adduction of R hip beyond midline)  CARE Score: 3  Discharge Goal: Independent    Lying to Sitting on Side of Bed  Assistance Needed: Supervision or touching assistance  Comment: Sup.   CARE Score: 4  Discharge Goal: Independent    Transfers:    Sit to Stand  Assistance Needed: Supervision or touching assistance  Comment: SBA using RW  CARE Score: 4  Discharge Goal: Independent    Chair/Bed-to-Chair Transfer  Assistance Needed: Supervision or touching assistance  Comment: SBA using RW  CARE Score: 4  Discharge Goal: Independent    Toilet Transfer  Assistance Needed: Partial/moderate assistance(Min A using grab bars)  CARE Score: 3    Car Transfer  Assistance Needed: Partial/moderate assistance(Min A using RW; pt was able to actively lift BLE in and out of car but required VCs on RLE positioning following posterior hip precautions)  CARE Score: 3  Discharge Goal: Independent    Ambulation:    Walking Ability  Does the Patient Walk?: Yes     Walk 10 Feet  Assistance Needed: Supervision or touching assistance  Comment: SBA using RW  CARE Score: 4  Discharge Goal: Supervision or touching assistance     Walk 50 Feet with Two Turns  Assistance Needed: Supervision or touching assistance  Comment: SBA using RW  Reason if not Attempted: Not attempted due to medical condition or safety concerns(pt was only able to tolerate 18 ft today (limited by fatigue and dizziness))  CARE Score: 4  Discharge Goal: Supervision or touching assistance     Walk 150 Feet  Assistance Needed: Supervision or touching assistance  Comment: SBA using RW; inconstistent with this performance due to dizziness; 197 ft yesterday, 137 ft at the most today  Reason if not Attempted: Not attempted due to medical condition or safety concerns  CARE Score: 4  Discharge Goal: Supervision or touching assistance     Walking 10 Feet on Uneven Surfaces  Assistance Needed: Supervision or touching assistance  Comment: SBA using RW  CARE Score: 4  Discharge Goal: Supervision or touching assistance     1 Step (Curb)  Assistance Needed: Supervision or touching assistance  Comment: SBA using RW  Reason if not Attempted: Not attempted due to medical condition or safety concerns  CARE Score: 4  Discharge Goal: Supervision or touching assistance     4 Steps  Assistance Needed: Supervision or touching assistance  Comment: SBA to ascend/descend 5 steps using railings  Reason if not Attempted: Not applicable  CARE Score: 4  Discharge Goal: Not Applicable     12 Steps  Reason if not Attempted: Not applicable  CARE Score: 9  Discharge Goal: Not Applicable    Gait Deviations: []None []Slow mj  [] Increased DIONNE  [] Staggers []Deviated Path  [] Decreased step length  [] Decreased step height  []Decreased arm swing  [] Shuffles  [] Decreased head and trunk rotation  []other:        Wheelchair:  w/c Ability: Wheelchair Ability  Uses a Wheelchair and/or Scooter?: Yes    Wheel 50 Feet with Two Turns  Assistance Needed: Supervision or touching assistance(SBA-Sup)  CARE Score: 4  Discharge Goal: Independent    Wheel 150 Feet  Assistance Needed: Partial/moderate assistance(pt was only able to propel up to 75 ft with SBA-Sup; activity tolerance was limited by fatigue and worsening lethargy)  CARE Score: 3  Discharge Goal: Independent              Balance:        Object: Picking Up Object  Assistance Needed: Partial/moderate assistance(required Min A using RW and reacher)  CARE Score: 3  Discharge Goal: Independent    Fall Risk: []  Yes []  No    OCCUPATIONAL THERAPY  (Updated in QI)  Short term goals  Time Frame for Short term goals: STG=LTG :   Long term goals  Time Frame for Long term goals : 7-10 days or until d/c  Long term goal 1: Pt will complete feeding/grooming/oral care task c MOD I by d/c  Long term goal 2: Pt will complete total body bathing c MOD I by d/c  Long term goal 3: Pt will complete total body dressing (UB/LB/Footwear) c MOD I by d/c  Long term goal 4: Pt will complete toileting c MOD I by d/c  Long term goal 5: Pt will complete functional transfer (toilet, tub, shower) c MOD I by d/c. Long term goals 6: Pt will perform therex/therax to facilitate increased strength/endurance/ax tolerance (c emphasis on dynamic standing balance/tolerance >10 mins and BUE endurance) c MOD I in order to complete ADLs. :                                       OT IRF-MUSTAPHA scores and goals since initial assessment:    ADLs:    Eating: Eating  Assistance Needed: Independent  Comment: X  CARE Score: 6  Discharge Goal: Independent       Oral Hygiene: Oral Hygiene  Assistance Needed: Independent  Comment: X  CARE Score: 6  Discharge Goal: Independent    UB/LB Bathing: Shower/Bathe Self  Assistance Needed: Supervision or touching assistance  Comment: Sup  CARE Score: 4  Discharge Goal: Independent    UB Dressing: Upper Body Dressing  Assistance Needed: Independent  Comment: X  CARE Score: 6  Discharge Goal: Independent         LB Dressing: Lower Body Dressing  Assistance Needed: Supervision or touching assistance  Comment: Sup  CARE Score: 4  Discharge Goal: Independent    Donning and [place] Footwear: Putting On/Taking Off Footwear  Assistance Needed: Supervision or touching assistance  Comment: Sup using sock aide; therapist jatinder, patient recall and demo with 100% accuracy  CARE Score: 4  Discharge Goal: Independent      Toileting:  Toileting Hygiene  Assistance Needed: Supervision or touching assistance  Comment: Sup in stance to manage clothing fully over hips  CARE Score: 4  Discharge Goal: Independent      Toilet Transfers: Toilet Transfer  Assistance Needed: Supervision or touching assistance  Comment: Sup  CARE Score: 4  Discharge Goal: Independent      Impairments/deficits, barriers:  Dizziness, nausea   Assessment  Performance deficits / Impairments: Decreased functional mobility , Decreased balance, Decreased coordination, Decreased vision/visual deficit, Decreased posture, Decreased ADL status, Decreased high-level IADLs, Decreased ROM, Decreased fine motor control, Decreased strength  Decision Making: High Complexity  REQUIRES OT FOLLOW UP: Yes  Equipment needed at discharge:TTB      COGNITIVE FUNCTION/SPEECH THERAPY (AS INDICATED)  LTG    Pt presents with functional oral and pharyngeal phase, no overt s/sx aspiration/penetration noted across all consistencies. Pt reporting of s/sx esophageal difficulty, resolved with independent use of strategies including small bites, alt bites/sips, and chewing thoroughly.  In discussion for possible GI referral, pt reporting \"this isn't a chronic issue\"                                             Nursing Current Medical Status:   [x] Is continent of bowel and bladder     [] Is incontinent of bowel and bladder    [] Has had an adequate number of bowel movements   [] Urinates with no urinary retention >300ml in bladder   [] Targeting bladder protocol with mcdonough removal   [] Maintaining O2 SATs at 92% or greater   [x] Has pain managed while on ARU         [] Has had no skin breakdown while on ARU   [] Has improved skin integrity via wound measurements   [] Has no signs/symptoms of infection at the wound site   [] Pressure wounds Stage/Location:    [] Arrived on unit with pressure wound  [x] Has been free from injury during hospitalization   [] Has experienced a fall during hospitalization  [] Ongoing education with patient/family with understanding demonstrated for:  [] Receives IV Fluids  [] Other:  No concerns at this time. NUTRITION  Weight: 108 lb 3.2 oz (49.1 kg) / Body mass index is 19.17 kg/m². Current diet: DIET GENERAL;  Dietary Nutrition Supplements: Frozen Oral Supplement  Dietary Nutrition Supplements: Standard High Calorie Oral Supplement  Intake: Meal intake varies 25-75%, agreeable to oral nutrition supplements      Medical improvements/barriers: Dizziness, med monitoring, good recall of cues and carryover, dbl vision        Team goals for next treatment period/Intervention for current barriers:   [x] Pt will increase activity tolerance for daily tasks. [] Pt will improve bed mobility with reduced assist.  [] Pt will improve safety in fx tasks with reduced cues/assist  [x] Pt will improve transfers with reduced assist  [x] Pt will improve toileting with reduced assist  [x] Pt will improve ADL's with use of adaptive equipment with reduced assist  [] Pt will improve pain mgmt for maximum participation in tx program  [] Pt will improve communication to get basic needs met on unit  [] Pt will improve swallowing for safe diet advancement with use of strategies  []  Plan for discharge to home. Patient Strengths: Motivated, Cooperative and Pleasant    Justification for Continued Stay  Based on my medical assessment of the patient and review of information from the interdisciplinary team as part of this weekly team conference, the patient continues to meet the following criteria for IRF level of care:   The patient requires active and ongoing intervention of multiple therapy disciplines   The patient requires and intensive rehabilitation therapy program   The patient requires continued physician supervision by a rehabilitation physician   The patient requires 24 hours rehab nursing care   The patient requires an intensive and coordinated interdisciplinary team approach to the delivery of rehabilitative care.      Assessment/Plan   [x]  The patient is making good progression towards their long term goals and is actively participating in and has a reasonable expectation to continue to benefit from the intensive rehabilitation therapy program   []  The estimated discharge date has been changed from initial team conference due to:   []  The estimated discharge destination has been changed from initial team conference due to:         Ongoing tx following discharge: [x]C OT, PT     []OUTPATIENT     [] No Further Treatment     [] Family/Caregiver Training  []  Transitional Living Arrangement   [] Home Assessment (date  )     [] Family Conference   []  Therapeutic Pass       []  Other: (specify)    Estimated Discharge Date: 3/13/21    Estimated Discharge Destination: []home alone   [x]home alone with assist prn  []Continuous supervision []Return home with spouse/family   [] Assisted living  []SNF     Team members participating in today's conference. [x] Gemma Suazo, Medical Director  [x] Dennie Husky,   [x] Kelly Linn, Nurse Mgr     [x]  Ruchi Solorzano, PT   [x] Martita Cooper, OT   []  Sunita Zhang, PT  [] Lakeisha Zhu, OT      [x] Ashley Yuan, LUCRECIA     []  James Renteria, RD     [x] Ida Caldera,     [x]Abril Salcido,    [x] Susannah Rm, SALOME    [] Yusef Escoto RN  [] Tyler Montes RN    [] Lencho Jack, SALOME    [] Ash Reeves,  SALOME      [] Tommy Reyna RN    I have led this Team Conference and agree with the plan, Lala Schaefer MD, 3/4/2021, 12:50 PM  Goals have been updated to reflect recent status.     Team conference note transcribed this date by: Terri Weiss MA, [id number] [facility], Therapy Coordinator

## 2021-03-03 NOTE — CONSULTS
EEG in future. Will do further chart investigation. 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 Right hip fx: s/p surgery. Hypertension: Monitor. Left leg pain: chronic. xray negative. On gabapentin.       Plan: Will check bloodwork. May need EEG to r/o any seizure activity. Will investigate medical records. Monitor blood pressure. Continue rehabilitiave therapy.              Electronically signed by Samanta Brar PA-C on 3/3/2021 at 9:43 AM   I have independently evaluated and examined this patient today. I have reviewed radiologic and biochemical tests on this patient. Management Plan is developed mutually with EMY Aragon. I have reviewed above note and agree with assessment and plan. Discussed with patient about her episode. I discussed with Dr. Xu Canseco about the episodes patient is having.

## 2021-03-03 NOTE — PROGRESS NOTES
Speech Language Pathology  Facility/Department: [address] [place] [address]   CLINICAL BEDSIDE SWALLOW EVALUATION    NAME: Cathy Hernandez  : 1923  MRN: [id number]    ADMISSION DATE: 2021             Past Medical History:  has no past medical history on file. Past Surgical History:  has no past surgical history on file. Date of Eval: 3/3/2021  Evaluating Therapist: Aren Kate     Current Diet level:  General    Primary Complaint:   History of present illness: Patient is a 42-year-old right-hand-dominant female who suffered an apparent trip and fall in her independent apartment at the [address] [place] [address] on 2021. She does not recall feeling dizzy or tired or having any palpitations preceding the fall. She did state that she does have \"episodes of feeling tired\". She did not describe shaking or palpitations with these episodes. She was able to crawl to reach her phone and alert emergency services. In our ED she had a minimally displaced right subcapital hip fracture. The next day Dr. Randa Mackay performed a hemiarthroplasty. He is requesting posterior hip precautions. Patient has had significant pain, weakness and a drop in hemoglobin. She has been unable to do her own self-care, transfers or toileting. She is also not had a bowel movement throughout this entire surgical stay. She has developed some abdominal distention which bothers her. She did report intermittent flatus. She requires inpatient rehabilitation at this time.         Pain:   Pain Assessment  Pain Assessment: 0-10  Pain Level: 5  Patient's Stated Pain Goal: No pain  Pain Type: Acute pain  Pain Location: Head  Pain Orientation: Right  Pain Descriptors: Discomfort  Pain Frequency: Intermittent  Pain Onset: On-going  Clinical Progression: Not changed  Functional Pain Assessment: Prevents or interferes some active activities and ADLs  Non-Pharmaceutical Pain Intervention(s): Repositioned  Response to Pain Intervention: Asleep with RR greater than 10    Reason for Referral  Shaji Leonardo was referred for a bedside swallow evaluation to assess the efficiency of her swallow function, identify signs and symptoms of aspiration, and make recommendations regarding safe dietary consistencies, effective compensatory strategies, and safe eating environment. Impression  Dysphagia Diagnosis  Dysphagia Diagnosis: Swallow function appears grossly intact  Dysphagia Impression : Pt presents with functional oral and pharyngeal phase, no overt s/sx aspiration/penetration noted across all consistencies. Pt reporting of s/sx esophageal difficulty, resolved with independent use of strategies including small bites, alt bites/sips, and chewing thoroughly. In discussion for possible GI referral, pt reporting \"this isn't a chronic issue. I know that if I do the things I'm supposed to do, like chew thoroughly and avoid foods that will get stuck, I'm fine. I'm 80years old, I know things aren't always going to work at this age. I don't want to go down the rabbit hole. I want to fix one thing at a time. \"        Treatment Plan  Requires SLP Intervention: No  Referral To: GI if condition worsens or as pt feels comfortable to pursue. D/C Recommendations: Home independently      Recommended Diet and Intervention  Solid: Diet Solids Recommendation: Regular  Liquid: Liquid Consistency Recommendation: Thin  Medication:        Compensatory Swallowing Strategies Attempted  Compensatory Swallowing Strategies: Alternate solids and liquids;Small bites/sips;Eat/Feed slowly; Other (comments); Upright as possible for all oral intake(consider warm liquids to improve s/sx esophageal difficulty.)      General  Chart Reviewed: Yes  Subjective: Pt alert and oriented.  Pt c/o difficulty with swallowing meats, bread items, stringy\" foods, hard/crunchy foods, salads with large leaves, etc. Pt reporting that it gets stuck \"lower down\" pointing to base of neck, reporting that at times she has to make herself gag/throw up with foamy expectoration. Behavior/Cognition  Behavior/Cognition: Alert;Pleasant mood         Vision/Hearing  Vision  Vision: Impaired  Vision Exceptions: Wears glasses at all times    Oral Motor Deficits  Oral/Motor  Oral Motor:  Within functional limits    Oral Phase Dysfunction  Oral Phase  Oral Phase: WFL     Indicators of Pharyngeal Phase Dysfunction   Pharyngeal Phase  Pharyngeal Phase: WNL  Pharyngeal Phase   Pharyngeal: No overt s/sx aspiration/penetration noted    Prognosis       Education  Patient Education Response: Demonstrated understanding       Therapy Time  SLP Individual Minutes  Time In: 1411  Time Out: 4293  Minutes: 15     SLP Total Treatment Time  Timed Code Treatment Minutes: 15 Minutes  Total Treatment Time: [facility] , SLP  3/3/2021 10:48 AM

## 2021-03-03 NOTE — FLOWSHEET NOTE
[x] daily progress note       [] discharge       Patient Name:  Celso    :  1923 MRN: [id number]  Room:  [address] [place] [address] Date of Admission: 2021  Rehabilitation Diagnosis:   Fracture of unspecified part of neck of right femur, initial encounter for closed fracture [S72.001A]  Closed displaced fracture of right femoral neck ([address] [S72.001A]       Date 3/3/2021       Day of ARU Week:  4   Time IN/-945  9186-1077   Individual Tx Minutes 113   TOTAL Tx Time Mins 113   Variance Time -7 minutes (SLP had 15 extra minutes which assisted with obtaining make-up minutes (8) that patient had missed in another session on previous day.)   Restrictions Restrictions/Precautions  Restrictions/Precautions: Fall Risk, Weight Bearing, ROM Restrictions  Position Activity Restriction  Hip Precautions: No hip flexion > 90 degrees, No hip internal rotation, No ADduction, Posterior hip precautions   Communication with other providers: [x]   OK to see per nursing:     []   Spoke with team member regarding:      Subjective observations and cognitive status: AM session: pt seen in semi-link's position in bed at beginning of treatment. Agreeable to therapy. Able to recall 1/3 hip precautions. PM session: pt seen in semi-link's position in bed at beginning of treatment. Agreeable to therapy. Pain level/location:    0/10          Discharge recommendations  Anticipated discharge date: Expected Discharge Date: 21  Destination: []?home alone   []?home alone with assist PRN     []? home w/ family      []? Continuous supervision  []? SNF    []? Assisted living     []? Other:  Continued therapy: []?C PT  []? OUTPATIENT PT   []? No Further PT  []? SNF PT  Caregiver training recommended: []? Yes  []? No   Equipment needs: RW and manual w/c         Bed Mobility:           [x]   Pt received out of bed in PM   Scooting:   Mod I   Lying --> Sit:  Supervision (vcs for hip precautions) (vcs to avoid internal rotation of right hip) (pt used bed rails and HOB in semi-link's position). Sit --> lying:  Supervision (vcs for hip precautions (vcs to avoid internal rotation of right hip) (bed was flat; pt used bed rails)     Transfers:    Sit--> Stand:  SBA  Stand --> Sit:  SBA  Chair-->Bed/Bed --> Chair:  SBA  Toilet Transfer: SBA  Assistive device required for transfer:  RW  vcs for proper hand placement. Gait:    Distance: AM session: 520'+927'+846'; PM session: 130'  Assistance: SBA-CGA  Device:  RW  Gait Quality:  Reciprocal pattern    Stairs   # Completed: 5  Assistance:  SBA  Supportive Device:  2 rails  vcs for proper sequencing. Uneven Surfaces:       Assistance:   SBA  Device: RW  Surfaces Completed:   [x]  Carpeted Surface     []  Throw rugs       []  Ramp       []  Outdoor pavements        []  Grass             []  Loose gravel        []  Other:       Pt amb up/down 4\" curb step SBA with RW. Vcs for proper sequencing and walker safety. Additional Therapeutic activities/exercises completed this date:     []   Nu-step:  Time:        Level:         #Steps:       []   Rebounder:    []  Seated     []  Standing        [x]   Balance training: AM session: pt stood at toilet and performed pull-up and pull-down of pants and briefs SBA with RW. Pt stood at sink and performed washing and drying of hands SBA with RW.          []   Postural training    [x]   Supine ther ex (reps/sets): AM session: ankle pumps, quad sets, buttock squeezes x 20 reps each for strengthening. PM session: heel slides, SAQs x 20 reps each for strengthening. [x]   Seated ther ex (reps/sets): PM session: LAQs, marching, knee bends x 20 reps each for strengthening. []   Standing ther ex (reps/sets):     []   Picking up object from floor (standing):                   []   Reacher used   [x]   Other: pt able to recall 1/3 hip precautions.     []   Other:    Patient/Caregiver Education and Training:   [x]   Bed Mobility/Transfer technique/safety  [x]   Gait technique/sequencing  [x]   Proper use of assistive device  [x]   Advanced mobility safety and technique  [x]   Reinforced patient's precautions/mobility while maintaining precautions  []   Postural awareness  []   Family training  [x]   Progress was updated in Rehab[facility] this date. Treatment Plan for Next Session: exercises; gait; advanced gait; stair training; balance training; transfers      Assessment:    Treatment/Activity Tolerance:   [x] Tolerated treatment with no adverse effects    [] Patient limited by fatigue  [] Patient limited by pain   [] Patient limited by medical complications:    [] Adverse reaction to Tx:   [] Significant change in status    Safety:       [x]  bed alarm set    []  chair alarm set    []  Pt refused alarms                []  Telesitter activated      [x]  Gait belt used during tx session      [x]other: After AM session: pt left in semi-link's position in bed with call light at end of treatment. After PM session: pt left in semi-link's position in bed with call light at end of treatment. Number of Minutes/Billable Intervention  Gait Training 75   Therapeutic Exercise 30   Neuro Re-Ed    Therapeutic Activity 8   Wheelchair Propulsion    Group    Other:    TOTAL 113         Social History  Social/Functional History  Lives With: Alone  Type of Home: (Pt reports living at [facility])  Home Layout: One level  Home Access: Stairs to enter without rails  Entrance Stairs - Number of Steps: threshold step at the door (garage entrance)  Bathroom Shower/Tub: Tub/Shower unit, Walk-in shower, Shower chair with back(soaked in tub at baseline)  [employer]: Standard  Bathroom Equipment: Toilet raiser  Bathroom Accessibility: (2nd bathroom c walk-in shower is w/c/fww.  However pt reports use of tu/shower bathroom and is not w/c and walker accessible.)  Home Equipment: Cane  ADL Assistance: Independent  Homemaking Responsibilities: Evaluation, Education, & procurement, Balance Training, Pain Management, Functional Mobility Training, Endurance Training, Home Exercise Program, Positioning, Transfer Training, Safety Education & Training, Wheelchair Mobility Training    Electronically signed by   Antony Lee, [id number]  3/3/2021, 8:48 AM

## 2021-03-03 NOTE — PROGRESS NOTES
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    Prior (baseline) level of function: Independent. Current level of function:         Current  IRF-M[facility] and Goals:   Occupational Therapy:    Short term goals  Time Frame for Short term goals: STG=LTG :   Long term goals  Time Frame for Long term goals : 7-10 days or until d/c  Long term goal 1: Pt will complete feeding/grooming/oral care task c MOD I by d/c  Long term goal 2: Pt will complete total body bathing c MOD I by d/c  Long term goal 3: Pt will complete total body dressing (UB/LB/Footwear) c MOD I by d/c  Long term goal 4: Pt will complete toileting c MOD I by d/c  Long term goal 5: Pt will complete functional transfer (toilet, tub, shower) c MOD I by d/c. Long term goals 6: Pt will perform therex/therax to facilitate increased strength/endurance/ax tolerance (c emphasis on dynamic standing balance/tolerance >10 mins and BUE endurance) c MOD I in order to complete ADLs.  :                                       Eating: Eating  Assistance Needed: Independent  Comment: X  CARE Score: 6  Discharge Goal: Independent       Oral Hygiene: Oral Hygiene  Assistance Needed: Independent  Comment: X  CARE Score: 6  Discharge Goal: Independent    UB/LB Bathing: Shower/Bathe Self  Assistance Needed: Supervision or touching assistance  Comment: Sup  CARE Score: 4  Discharge Goal: Independent    UB Dressing: Upper Body Dressing  Assistance Needed: Independent  Comment: X  CARE Score: 6  Discharge Goal: Independent         LB Dressing: Lower Body Dressing  Assistance Needed: Supervision or touching assistance  Comment: Sup  CARE Score: 4  Discharge Goal: Independent    Donning and [place] Footwear: Putting On/Taking Off Footwear  Assistance Needed: Supervision or touching assistance  Comment: Sup using sock aide; therapist jatinder, patient recall and demo with 100% accuracy  CARE Score: 4  Discharge Goal: Independent      Toileting: Toileting Hygiene  Assistance Needed: Supervision or touching assistance  Comment: Sup in stance to manage clothing fully over hips  CARE Score: 4  Discharge Goal: Independent      Toilet Transfers: Toilet Transfer  Assistance Needed: Supervision or touching assistance  Comment: Sup  CARE Score: 4  Discharge Goal: Independent    Physical Therapy:   Short term goals  Time Frame for Short term goals: 10 tx days:  Short term goal 1: Pt will complete bed mobility tasks (rolling L/R, scooting, and sup<->sit) Ind following posterior hip precautions. Short term goal 2: Pt will complete OOB transfers using RW Mod Ind. Short term goal 3: Pt will ambulate 150 ft using RW over level surface and 10 ft over carpeted/transitional surface with SBA. Short term goal 4: Pt will ascend/descend curb step using RW with CGA. Short term goal 5: Pt will complete object retrieval from the floor with 1UE support on RW and using reacher Mod Ind. Short term goal 6: Pt will propel manual w/c >/=150 ft Mod Ind.             Bed Mobility:   Sit to Lying  Assistance Needed: Supervision or touching assistance(SBA-Sup; pt required 2 attempts to complete transfer; actively lifted RLE with increased time and effort)  CARE Score: 4  Discharge Goal: Independent  Roll Left and Right  Assistance Needed: Partial/moderate assistance(Rolling to R Min A; SBA to L with additional set-up assist of pillow between thighs to avoid adduction of R hip beyond midline)  CARE Score: 3  Discharge Goal: Independent  Lying to Sitting on Side of Bed  Assistance Needed: Supervision or touching assistance(SBA-Sup without use of bed features; required VCs not to internally rotate R hip as she moved her RLE off the bed; once transitioned to sitting pt had spinning sensation requiring CGA on trunk to maintain postural stability)  CARE Score: 4  Discharge Goal: Independent    Transfers:    Sit to Stand  Assistance Needed: Partial/moderate assistance(Min A using RW; VCs on proper hand placement as pt immediately wanted to pull onto RW)  CARE Score: 3  Discharge Goal: Independent  Chair/Bed-to-Chair Transfer  Assistance Needed: Partial/moderate assistance(Min A using RW; required VCs on hand placement to armrests of w/c)  CARE Score: 3  Discharge Goal: Independent  Toilet Transfer  Assistance Needed: Partial/moderate assistance(Min A using grab bars)  CARE Score: 3  Car Transfer  Assistance Needed: Partial/moderate assistance(Min A using RW; pt was able to actively lift BLE in and out of car but required VCs on RLE positioning following posterior hip precautions)  CARE Score: 3  Discharge Goal: Independent    Ambulation:    Walking Ability  Does the Patient Walk?: Yes     Walk 10 Feet  Assistance Needed: Partial/moderate assistance(Min A using RW)  CARE Score: 3  Discharge Goal: Supervision or touching assistance     Walk 50 Feet with Two Turns  Reason if not Attempted: Not attempted due to medical condition or safety concerns(pt was only able to tolerate 18 ft today (limited by fatigue and dizziness))  CARE Score: 88  Discharge Goal: Supervision or touching assistance     Walk 150 Feet  Reason if not Attempted: Not attempted due to medical condition or safety concerns  CARE Score: 88  Discharge Goal: Supervision or touching assistance     Walking 10 Feet on Uneven Surfaces  Assistance Needed: Partial/moderate assistance(Min A over carpeted/transitional surface using RW)  CARE Score: 3  Discharge Goal: Supervision or touching assistance     1 Step (Curb)  Comment: pt was too lethargic to attempt today and had persistent dizziness  Reason if not Attempted: Not attempted due to medical condition or safety concerns  CARE Score: 88  Discharge Goal: Supervision or touching assistance     4 Steps  Reason if not Attempted: Not applicable  CARE Score: 9  Discharge Goal: Not Applicable     12 Steps  Reason if not Attempted: Not applicable  CARE Score: 9  Discharge Goal: Not Applicable       Wheelchair:  w/c Ability: Wheelchair Ability  Uses a Wheelchair and/or Scooter?: Yes  Wheel 50 Feet with Two Turns  Assistance Needed: Supervision or touching assistance(SBA-Sup)  CARE Score: 4  Discharge Goal: Independent  Wheel 150 Feet  Assistance Needed: Partial/moderate assistance(pt was only able to propel up to 75 ft with SBA-Sup; activity tolerance was limited by fatigue and worsening lethargy)  CARE Score: 3  Discharge Goal: Independent          Balance:        Object: Picking Up Object  Assistance Needed: Partial/moderate assistance(required Min A using RW and reacher)  CARE Score: 3  Discharge Goal: Independent    I      Exam:    Blood pressure 128/61, pulse 79, temperature 97 °F (36.1 °C), temperature source Oral, resp. rate 16, height 5' 2.99\" (1.6 m), weight 110 lb (49.9 kg), SpO2 95 %. General: Up in a bedside chair with legs elevated. Remains hard of hearing but engages in conversation. HEENT: Full visual field. Speech clear. Pulmonary: Unlabored without coughing. Cardiac: RRR. Abdomen: Patient's abdomen is soft and nondistended. Bowel sounds were present throughout. There was no rebound, guarding or masses noted. Upper extremities: Functional coordination and strength. No new bruising. Lower extremities: No signs of DVT. Calf soft. Right thigh swollen as expected. Staples intact. Sitting balance was fair+. Standing balance was poor.     Lab Results   Component Value Date    WBC 7.2 03/03/2021    HGB 9.8 (L) 03/03/2021    HCT 32.1 (L) 03/03/2021    MCV 97.0 03/03/2021     03/03/2021     Lab Results   Component Value Date    INR 0.91 02/22/2021    PROTIME 11.0 (L) 02/22/2021     Lab Results   Component Value Date    CREATININE 1.1 03/03/2021    BUN 26 (H) 03/03/2021     03/03/2021    K 4.7 03/03/2021     03/03/2021    CO2 31 03/03/2021     Lab Results   Component Value Date     (H) 02/23/2021     (H) 02/23/2021    ALKPHOS 135 (H) 02/23/2021    BILITOT 1.2 (H) 02/23/2021       Expected length of stay  prior to a supervised level of function for discharge home with a walker and [facility] OT/PT is 2 weeks. Recommendations:    1. Right femoral neck fracture with hemiarthroplasty:   Still struggling with fatigue and some dizziness during her daily occupational and physical therapy. Ongoing pulmonary hygiene, DVT prophylaxis and cautious pain management. Steady oral intake noted.  Bowel and bladder retraining ongoing.    Although it is more trouble at rest, her difficulty with left leg use due to her sciatica continues to impact her training with hip precautions.  Adaptive equipment education.  Verbal cues and moderate physical assistance for transfers again today. 2. DVT prophylaxis: Lovenox 30 mg subcu daily.  I must monitor her hemoglobin and platelet count periodically while on this medication.  Weightbearing activities are  are limited but are done daily.  GI prophylaxis offered.  No clinical signs of blood loss. 3. Uncontrolled hypertension: Better tolerance now for her Cardizem, Catapres and Cozaa. Target systolic blood pressure is 120-140.  Vital signs are checked at rest and with activity.  Consistent oral hydration is encouraged.     Still monitoring her response to the removal of hydrochlorothiazide.     4. Obstipation: The patient had painful distention of her abdomen upon admission to rehab.  Stat x-ray showed appropriate gas patterns and no signs of obstruction.  She received suppository and has had multiple bowel

## 2021-03-03 NOTE — PROGRESS NOTES
Physical Rehabilitation: OCCUPATIONAL THERAPY     [x] daily progress note       [] discharge       Patient Name:  Aneta Prakash   :  1923 MRN: [id number]  Room:  [address] [place] [address] Date of Admission: 2021  Rehabilitation Diagnosis:   Fracture of unspecified part of neck of right femur, initial encounter for closed fracture [S72.001A]  Closed displaced fracture of right femoral neck ([address] [S72.001A]       Date 3/3/2021       Day of ARU Week:  4   Time IN/OUT 1100/1200   Individual Tx Minutes 60   Group Tx Minutes    Co-Treat Minutes    Concurrent Tx Minutes    TOTAL Tx Time Mins 60   Variance Time    Variance Time []   Refusal due to:     []   Medical hold/reason:    []   Illness   []   Off Unit for test/procedure  []   Extra time needed to complete task  []   Therapeutic need  []   Other (specify):   Restrictions Restrictions/Precautions: Fall Risk, Weight Bearing, ROM Restrictions     Hip Precautions: No hip flexion > 90 degrees, No hip internal rotation, No ADduction, Posterior hip precautions   Communication with other providers: [x]   OK to see per nursing:     []   Spoke with team member regarding:      Subjective observations and cognitive status: Patient in semi fowlers upon therapist entering, pleasant and agreeable to therapy    Pain level/location:    /10       Location: per patient no pain noted    Discharge recommendations  Anticipated discharge date:  TBD  Destination: []home alone   []home alone w assist prn   [] home w/ family    [] Continuous supervision       []SNF    [] Assisted living     [] Other:   Continued therapy: []HHC OT  []OUTPATIENT  OT   [] No Further OT  Equipment needs: TBD       ADLs:    Eating: Eating  Assistance Needed: Independent  Comment: X  CARE Score: 6  Discharge Goal: Independent       Oral Hygiene: Oral Hygiene  Assistance Needed: Independent  Comment: X  CARE Score: 6  Discharge Goal: Independent    UB/LB Bathing: Shower/Bathe Self  Assistance Needed: Supervision or touching assistance  Comment: Sup  CARE Score: 4  Discharge Goal: Independent    UB Dressing: Upper Body Dressing  Assistance Needed: Independent  Comment: X  CARE Score: 6  Discharge Goal: Independent         LB Dressing: Lower Body Dressing  Assistance Needed: Supervision or touching assistance  Comment: Sup  CARE Score: 4  Discharge Goal: Independent    Donning and [place] Footwear: Putting On/Taking Off Footwear  Assistance Needed: Supervision or touching assistance  Comment: Sup using sock aide; therapist jatinder, patient recall and demo with 100% accuracy  CARE Score: 4  Discharge Goal: Independent      Toileting: Toileting Hygiene  Assistance Needed: Supervision or touching assistance  Comment: Sup in stance to manage clothing fully over hips  CARE Score: 4  Discharge Goal: Independent      Toilet Transfers:     Toilet Transfer  Assistance Needed: Supervision or touching assistance  Comment: Sup  CARE Score: 4  Discharge Goal: Independent  Device Used:    []   Standard Toilet         []   Grab Bars           []  Bedside Commode       [x]   Elevated Toilet          []   Other:        Bed Mobility:           []   Pt received out of bed   Rolling R/L:    Scooting:  Sup  Supine --> Sit:  Sup  Sit --> Supine:      Transfers:    Sit--> Stand:  SBA  Stand --> Sit:   SBA  Stand-Pivot:   SBA  Other:    Assistive device required for transfer:   RW, Grab bars, Countertop       Functional Mobility:  Throughout room/bathroom   Assistance:  SBA   Device:   [x]   Rolling Walker     []   Standard Walker []   Wheelchair        []   [employer]       []   4-Wheeled Mia Mcnally         []   Cardiac Mia Mcnally       []   Other:        Homemaking Tasks:   SBA  Patient retrieves clothing from [employer] and transports to bathroom prior to shower       Additional Therapeutic activities/exercises completed this date:     [x]   ADL Training   [x]   Balance/Postural training     [x]   Bed/Transfer Training   []   Endurance Training   []   Neuromuscular Re-ed   []   Nu-step:  Time:        Level:         #Steps:       []   Rebounder:    []  Seated     []  Standing        []   Supine Ther Ex (reps/sets):     []   Seated Ther Ex (reps/sets):     []   Standing Ther Ex (reps/sets):     []   Other:      Comments:      Patient/Caregiver Education and Training:   [x]   [employer] Equipment Use  [x]   Bed Mobility/Transfer Technique/Safety  [x]   Energy Conservation Tips  []   Family training  [x]   Postural Awareness  [x]   Safety During Functional Activities  [x]   Reinforced Patient's Precautions Patient asked several times throughout therapy session to recite hip precautions, patient able  recall all precautions 85% of the time  Therapist to re enforce no hip flexion past 90 degrees   []   Progress was updated and reviewed in Rehabtracker with patient and/or family this         date.     Treatment Plan for Next Session: POC to continue as tolerated       Assessment:        Treatment/Activity Tolerance:   [x] Tolerated treatment with no adverse effects    [] Patient limited by fatigue  [] Patient limited by pain   [] Patient limited by medical complications:    [] Adverse reaction to Tx:   [] Significant change in status    Safety:       []  bed alarm set    []  chair alarm set    []  Pt refused alarms                []  Telesitter activated      [x]  Gait belt used during tx session      []other:       Number of Minutes/Billable Intervention  Therapeutic Exercise    ADL Self-care 45   Neuro Re-Ed    Therapeutic Activity 15   Group    Other:    TOTAL 60       Social History  Social/Functional History  Lives With: Alone  Type of Home: (Pt reports living at [facility])  Home Layout: One level  Home Access: Stairs to enter without rails  Entrance Stairs - Number of Steps: threshold step at the door (garage entrance)  Bathroom Shower/Tub: Tub/Shower unit, Walk-in shower, Shower chair with back(soaked in tub at baseline)  Bathroom Toilet: Standard  Bathroom Plan of Care                                                                              Times per week: 5 days per week for a minimum of 60 minutes/day plus group as appropriate for 60 minutes.   Treatment to include Plan  Times per day: Daily  Current Treatment Recommendations: Strengthening, Patient/Caregiver Education & Training, Equipment Evaluation, Education, & procurement, Self-Care / ADL, Pain Management, Balance Training, Home Management Training, Functional Mobility Training, Safety Education & Training, ROM    Electronically signed by   BERTRAND Salinas  3/3/2021, 12:06 PM

## 2021-03-03 NOTE — PROGRESS NOTES
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 Prior (baseline) level of function: Independent. Current level of function:         Current  IRF-MUSTAPHA and Goals:   Occupational Therapy:    Short term goals  Time Frame for Short term goals: STG=LTG :   Long term goals  Time Frame for Long term goals : 7-10 days or until d/c  Long term goal 1: Pt will complete feeding/grooming/oral care task c MOD I by d/c  Long term goal 2: Pt will complete total body bathing c MOD I by d/c  Long term goal 3: Pt will complete total body dressing (UB/LB/Footwear) c MOD I by d/c  Long term goal 4: Pt will complete toileting c MOD I by d/c  Long term goal 5: Pt will complete functional transfer (toilet, tub, shower) c MOD I by d/c. Long term goals 6: Pt will perform therex/therax to facilitate increased strength/endurance/ax tolerance (c emphasis on dynamic standing balance/tolerance >10 mins and BUE endurance) c MOD I in order to complete ADLs. :                                       Eating: Eating  Assistance Needed: Setup or clean-up assistance  CARE Score: 5  Discharge Goal: Independent       Oral Hygiene: Oral Hygiene  Assistance Needed: Setup or clean-up assistance  Comment: Completed seated sink-side, required setup A to retrieve and open required items for oral hygiene.   CARE Score: 5  Discharge Goal: Independent    UB/LB Bathing: Shower/Bathe Self  Assistance Needed: Partial/moderate assistance  Comment: Min A c distal BLE, pt seated in w/c for UB, requires CGA in stance for posterior/anterior melony-area. CARE Score: 3  Discharge Goal: Independent    UB Dressing: Upper Body Dressing  Assistance Needed: Partial/moderate assistance  Comment: Min A to maintain balance/upright posture, required assist c reaching back for RUE of button up. CARE Score: 3  Discharge Goal: Independent         LB Dressing: Lower Body Dressing  Assistance Needed: Substantial/maximal assistance  Comment: Max A to thread BLE and assist c donning over hips. Pt requires steadying-Min A in stance 2* c/o dizziness. CARE Score: 2  Discharge Goal: Independent    Donning and [place] Footwear: Putting On/Taking Off Footwear  Assistance Needed: Dependent  Comment: Dependent for donning/doffing footwear, limited by posterior hip precautions. CARE Score: 1  Discharge Goal: Independent      Toileting: Toileting Hygiene  Assistance Needed: Partial/moderate assistance  Comment: Pt requires Min A for assistance c CM. Pt demo toileting hygiene in stance, requires CGA for steadying. CARE Score: 3  Discharge Goal: Independent      Toilet Transfers: Toilet Transfer  Assistance Needed: Partial/moderate assistance  Comment: Min A required for controlled descent to sit on toilet, requires use of grab bars to sit/get off toilet. Min A provided to get off toilet. CARE Score: 3  Discharge Goal: Independent    Physical Therapy:   Short term goals  Time Frame for Short term goals: 10 tx days:  Short term goal 1: Pt will complete bed mobility tasks (rolling L/R, scooting, and sup<->sit) Ind following posterior hip precautions. Short term goal 2: Pt will complete OOB transfers using RW Mod Ind. Short term goal 3: Pt will ambulate 150 ft using RW over level surface and 10 ft over carpeted/transitional surface with SBA. Short term goal 4: Pt will ascend/descend curb step using RW with CGA.   Short term goal 5: Pt will complete object retrieval from the floor with 1UE support on RW and using reacher Mod Ind.  Short term goal 6: Pt will propel manual w/c >/=150 ft Mod Ind.             Bed Mobility:   Sit to Lying  Assistance Needed: Supervision or touching assistance(SBA-Sup; pt required 2 attempts to complete transfer; actively lifted RLE with increased time and effort)  CARE Score: 4  Discharge Goal: Independent  Roll Left and Right  Assistance Needed: Partial/moderate assistance(Rolling to R Min A; SBA to L with additional set-up assist of pillow between thighs to avoid adduction of R hip beyond midline)  CARE Score: 3  Discharge Goal: Independent  Lying to Sitting on Side of Bed  Assistance Needed: Supervision or touching assistance(SBA-Sup without use of bed features; required VCs not to internally rotate R hip as she moved her RLE off the bed; once transitioned to sitting pt had spinning sensation requiring CGA on trunk to maintain postural stability)  CARE Score: 4  Discharge Goal: Independent    Transfers:    Sit to Stand  Assistance Needed: Partial/moderate assistance(Min A using RW; VCs on proper hand placement as pt immediately wanted to pull onto RW)  CARE Score: 3  Discharge Goal: Independent  Chair/Bed-to-Chair Transfer  Assistance Needed: Partial/moderate assistance(Min A using RW; required VCs on hand placement to armrests of w/c)  CARE Score: 3  Discharge Goal: Independent  Toilet Transfer  Assistance Needed: Partial/moderate assistance(Min A using grab bars)  CARE Score: 3  Car Transfer  Assistance Needed: Partial/moderate assistance(Min A using RW; pt was able to actively lift BLE in and out of car but required VCs on RLE positioning following posterior hip precautions)  CARE Score: 3  Discharge Goal: Independent    Ambulation:    Walking Ability  Does the Patient Walk?: Yes     Walk 10 Feet  Assistance Needed: Partial/moderate assistance(Min A using RW)  CARE Score: 3  Discharge Goal: Supervision or touching assistance     Walk 50 Feet with Two Turns  Reason if not Attempted: Not attempted due to medical condition or safety concerns(pt was only able to tolerate 18 ft today (limited by fatigue and dizziness))  CARE Score: 88  Discharge Goal: Supervision or touching assistance     Walk 150 Feet  Reason if not Attempted: Not attempted due to medical condition or safety concerns  CARE Score: 88  Discharge Goal: Supervision or touching assistance     Walking 10 Feet on Uneven Surfaces  Assistance Needed: Partial/moderate assistance(Min A over carpeted/transitional surface using RW)  CARE Score: 3  Discharge Goal: Supervision or touching assistance     1 Step (Curb)  Comment: pt was too lethargic to attempt today and had persistent dizziness  Reason if not Attempted: Not attempted due to medical condition or safety concerns  CARE Score: 88  Discharge Goal: Supervision or touching assistance     4 Steps  Reason if not Attempted: Not applicable  CARE Score: 9  Discharge Goal: Not Applicable     12 Steps  Reason if not Attempted: Not applicable  CARE Score: 9  Discharge Goal: Not Applicable       Wheelchair:  w/c Ability: Wheelchair Ability  Uses a Wheelchair and/or Scooter?: Yes  Wheel 50 Feet with Two Turns  Assistance Needed: Supervision or touching assistance(SBA-Sup)  CARE Score: 4  Discharge Goal: Independent  Wheel 150 Feet  Assistance Needed: Partial/moderate assistance(pt was only able to propel up to 75 ft with SBA-Sup; activity tolerance was limited by fatigue and worsening lethargy)  CARE Score: 3  Discharge Goal: Independent          Balance:        Object: Picking Up Object  Assistance Needed: Partial/moderate assistance(required Min A using RW and reacher)  CARE Score: 3  Discharge Goal: Independent    I      Exam:    Blood pressure 139/65, pulse 76, temperature 96.4 °F (35.8 °C), temperature source Oral, resp. rate 16, height 5' 2.99\" (1.6 m), weight 109 lb 4.8 oz (49.6 kg), SpO2 97 %. General: Semirecumbent in bed. Easily distracted. Hard of hearing. Oriented.     HEENT: Mucous membranes are dry. Neck supple. No JVD. Pulmonary: Unlabored breathing with symmetric air exchange. Cardiac: Regular rate and rhythm. Abdomen: Patient's abdomen is soft and nondistended. Bowel sounds were present throughout. There was no rebound, guarding or masses noted. Upper extremities: Able to bring both hands up to meet mine. Some crepitus at the shoulder. No bruising. Lower extremities: Tender swelling in the right thigh as expected. Staples approximate the skin edges well. Very little right hip and knee movement actively. No signs of DVT. Sitting balance was fair+. Standing balance was poor. Lab Results   Component Value Date    WBC 5.2 02/27/2021    HGB 9.9 (L) 02/27/2021    HCT 31.7 (L) 02/27/2021    MCV 95.8 02/27/2021     02/27/2021     Lab Results   Component Value Date    INR 0.91 02/22/2021    PROTIME 11.0 (L) 02/22/2021     Lab Results   Component Value Date    CREATININE 1.0 02/28/2021    BUN 25 (H) 02/28/2021     (L) 02/28/2021    K 4.7 02/28/2021    CL 98 (L) 02/28/2021    CO2 29 02/28/2021     Lab Results   Component Value Date     (H) 02/23/2021     (H) 02/23/2021    ALKPHOS 135 (H) 02/23/2021    BILITOT 1.2 (H) 02/23/2021       Expected length of stay  prior to a supervised level of function for discharge home with a walker and [facility] OT/PT is 2 weeks. Recommendations:    1. Right femoral neck fracture with hemiarthroplasty:  Positional dizziness with orthostatic blood pressure changes are limiting some of her engagement in the therapy. Expresses a desire to participate in the daily occupational and physical therapy. Benefiting from the pulmonary hygiene, DVT prophylaxis and cautious pain management.  Nutritional support.  Bowel and bladder retraining ongoing. Although it is more trouble at rest, her difficulty with left leg use due to her sciatica is impacting her training with hip precautions.  Adaptive equipment education.   Verbal cues and

## 2021-03-04 PROCEDURE — 94150 VITAL CAPACITY TEST: CPT

## 2021-03-04 PROCEDURE — 6370000000 HC RX 637 (ALT 250 FOR IP): Performed by: PHYSICAL MEDICINE & REHABILITATION

## 2021-03-04 PROCEDURE — 94761 N-INVAS EAR/PLS OXIMETRY MLT: CPT

## 2021-03-04 PROCEDURE — 94664 DEMO&/EVAL PT USE INHALER: CPT

## 2021-03-04 PROCEDURE — 97530 THERAPEUTIC ACTIVITIES: CPT

## 2021-03-04 PROCEDURE — 1280000000 HC REHAB R&B

## 2021-03-04 PROCEDURE — 97110 THERAPEUTIC EXERCISES: CPT

## 2021-03-04 PROCEDURE — 99233 SBSQ HOSP IP/OBS HIGH 50: CPT | Performed by: PHYSICAL MEDICINE & REHABILITATION

## 2021-03-04 PROCEDURE — 6360000002 HC RX W HCPCS: Performed by: INTERNAL MEDICINE

## 2021-03-04 PROCEDURE — 6370000000 HC RX 637 (ALT 250 FOR IP): Performed by: INTERNAL MEDICINE

## 2021-03-04 PROCEDURE — 97116 GAIT TRAINING THERAPY: CPT

## 2021-03-04 RX ADMIN — DOCUSATE SODIUM 100 MG: 100 CAPSULE, LIQUID FILLED ORAL at 07:50

## 2021-03-04 RX ADMIN — ASPIRIN 81 MG: 81 TABLET, COATED ORAL at 07:52

## 2021-03-04 RX ADMIN — ENOXAPARIN SODIUM 30 MG: 30 INJECTION SUBCUTANEOUS at 07:51

## 2021-03-04 RX ADMIN — DILTIAZEM HYDROCHLORIDE 240 MG: 240 CAPSULE, COATED, EXTENDED RELEASE ORAL at 07:50

## 2021-03-04 RX ADMIN — GABAPENTIN 100 MG: 100 CAPSULE ORAL at 20:43

## 2021-03-04 RX ADMIN — SENNOSIDES 8.6 MG: 8.6 TABLET, FILM COATED ORAL at 20:43

## 2021-03-04 RX ADMIN — OXYCODONE AND ACETAMINOPHEN 1 TABLET: 5; 325 TABLET ORAL at 14:52

## 2021-03-04 RX ADMIN — CLONIDINE HYDROCHLORIDE 0.1 MG: 0.1 TABLET ORAL at 20:43

## 2021-03-04 RX ADMIN — OXYCODONE AND ACETAMINOPHEN 1 TABLET: 5; 325 TABLET ORAL at 01:46

## 2021-03-04 RX ADMIN — LOSARTAN POTASSIUM 50 MG: 50 TABLET, FILM COATED ORAL at 07:50

## 2021-03-04 RX ADMIN — GABAPENTIN 100 MG: 100 CAPSULE ORAL at 08:07

## 2021-03-04 RX ADMIN — CLONIDINE HYDROCHLORIDE 0.1 MG: 0.1 TABLET ORAL at 07:50

## 2021-03-04 ASSESSMENT — PAIN DESCRIPTION - DESCRIPTORS: DESCRIPTORS: DISCOMFORT

## 2021-03-04 ASSESSMENT — PAIN DESCRIPTION - PAIN TYPE: TYPE: CHRONIC PAIN

## 2021-03-04 ASSESSMENT — PAIN SCALES - GENERAL
PAINLEVEL_OUTOF10: 7
PAINLEVEL_OUTOF10: 0
PAINLEVEL_OUTOF10: 9

## 2021-03-04 ASSESSMENT — PAIN DESCRIPTION - LOCATION: LOCATION: VAGINA

## 2021-03-04 ASSESSMENT — PAIN DESCRIPTION - ONSET: ONSET: ON-GOING

## 2021-03-04 NOTE — FLOWSHEET NOTE
[x] daily progress note       [] discharge       Patient Name:  Lauro Whitlock   :  1923 MRN: [id number]  Room:  [address] [place] [address] Date of Admission: 2021  Rehabilitation Diagnosis:   Fracture of unspecified part of neck of right femur, initial encounter for closed fracture [S72.001A]  Closed displaced fracture of right femoral neck ([address] [S72.001A]       Date 3/4/2021       Day of ARU Week:  5   Time IN/-953  [id number]   Individual Tx Minutes 120   TOTAL Tx Time Mins 120   Restrictions Restrictions/Precautions  Restrictions/Precautions: Fall Risk, Weight Bearing, ROM Restrictions  Position Activity Restriction  Hip Precautions: No hip flexion > 90 degrees, No hip internal rotation, No ADduction, Posterior hip precautions   Communication with other providers: [x]   OK to see per nursing:     []   Spoke with team member regarding:      Subjective observations and cognitive status: AM session: pt seen sitting up in w/c at beginning of treatment. Agreeable to therapy. Pt reported dizziness towards end of each gait training. /56; /63. Standing /54; 106/53; 100/50; 106/56; 107/53. PM session: pt seen in semi-link's position in bed at beginning of treatment. Agreeable to therapy. Pt reported feeling better and reported no dizziness during session. Pain level/location: 0/10          Discharge recommendations  Anticipated discharge date: Expected Discharge Date: 21  Destination: []??home alone   []? ?home alone with assist PRN     []? ? home w/ family      []? ? Continuous supervision  []? ?SNF    []? ? Assisted living     []? ? Other:  Continued therapy: []??[facility] PT  []??OUTPATIENT PT   []? ? No Further PT  []? ?SNF PT  Caregiver training recommended: []? ?Yes  []? ? No   Equipment needs: RW and manual w/c     Bed Mobility:            Scooting:  Supervision  Lying --> Sit:  Supervision   Sit --> lying:  Supervision    Transfers:    Sit--> Stand:  Supervision   Stand --> Sit: Family training  [x]   Progress was updated in Rehabtracker this date. Treatment Plan for Next Session: gait training; exercises; advanced gait; stair training; exercises     Assessment: This pt demonstrated a negative response to today's AM treatment session as evidenced by increased dizziness which decreased patient's gait distances. The patient is making progress toward established goals as evidenced by QI scores. Ongoing deficits are observed in the areas of dizziness during gait training and continued focus on gait training is recommended. Treatment/Activity Tolerance:   [x] Tolerated treatment with no adverse effects in PM     [x] Patient limited by dizziness in AM   [] Patient limited by pain   [] Patient limited by medical complications:    [] Adverse reaction to Tx:   [] Significant change in status    Safety:       [x]  bed alarm set    []  chair alarm set    []  Pt refused alarms                []  Telesitter activated      [x]  Gait belt used during tx session      [x]other: After AM session: pt left in semi-link's position in bed with call light at end of treatment. After PM session: pt left in semi-link's position in bed with call light at end of treatment. Number of Minutes/Billable Intervention  Gait Training 75   Therapeutic Exercise 30   Neuro Re-Ed    Therapeutic Activity 15   Wheelchair Propulsion    Group    Other:    TOTAL 120         Social History  Social/Functional History  Lives With: Alone  Type of Home: (Pt reports living at [facility])  Home Layout: One level  Home Access: Stairs to enter without rails  Entrance Stairs - Number of Steps: threshold step at the door (garage entrance)  Bathroom Shower/Tub: Tub/Shower unit, Walk-in shower, Shower chair with back(soaked in tub at baseline)  Bathroom Toilet: Standard  Bathroom Equipment: Toilet raiser  Bathroom Accessibility: (2nd bathroom c walk-in shower is w/c/fww.  However pt reports use of tu/shower bathroom and is not w/c and walker accessible.)  Home Equipment: Cane  ADL Assistance: Independent  Homemaking Responsibilities: Yes  Meal Prep Responsibility: Primary  Laundry Responsibility: Primary  Cleaning Responsibility: Primary  Bill Paying/Finance Responsibility: Primary(paige Robledo)  is POA)  Shopping Responsibility: Primary  Dependent Care Responsibility: No  Health Care Management: Primary  Ambulation Assistance: Independent  Transfer Assistance: Independent  Active : No  Patient's  Info: Pt uses [employer]  Mode of Transportation: Bus  Occupation: Retired  Leisure & Hobbies: Pt reports being very busy however enjoys painting: water color and oil painting. Pt also enjoys reading. Pt also reports enjoying walks however d/t balance concerns pt has stopped. Additional Comments: sleeps in regular,  flat bed; ~3 falls in the past year with recent fall requiring hospitalization due to injury    Objective                                                                                    Goals:  (Update in navigator)  Short term goals  Time Frame for Short term goals: 10 tx days:  Short term goal 1: Pt will complete bed mobility tasks (rolling L/R, scooting, and sup<->sit) Ind following posterior hip precautions. Short term goal 2: Pt will complete OOB transfers using RW Mod Ind. Short term goal 3: Pt will ambulate 150 ft using RW over level surface and 10 ft over carpeted/transitional surface with SBA. Short term goal 4: Pt will ascend/descend curb step using RW with CGA. Short term goal 5: Pt will complete object retrieval from the floor with 1UE support on RW and using reacher Mod Ind. Short term goal 6: Pt will propel manual w/c >/=150 ft Mod Ind.:   :        Plan of Care                                                                              Times per week: 5 days per week for a minimum of 60 minutes/day plus group as appropriate for 60 minutes.   Treatment to include Current Treatment Recommendations: Strengthening, Gait Training, Patient/Caregiver Education & Training, ROM, Equipment Evaluation, Education, & procurement, Balance Training, Pain Management, Functional Mobility Training, Endurance Training, Home Exercise Program, Positioning, Transfer Training, Safety Education & Training, Wheelchair Mobility Training    Electronically signed by   Sadiq Mccoy [id number]  3/4/2021, 8:55 AM

## 2021-03-04 NOTE — PROGRESS NOTES
TBD  Destination: []home alone   []home alone w assist prn   [x] home w/ family    [] Continuous supervision       []SNF    [] Assisted living     [] Other:   Continued therapy: [x]HHC OT  []OUTPATIENT  OT   [] No Further OT  Equipment needs: TTB     Bed Mobility:           [x]   Pt received out of bed       Additional Therapeutic activities/exercises completed this date:     []   ADL Training   []   Balance/Postural training     []   Bed/Transfer Training   [x]   Endurance Training  Pt tolerated 8.5 mins on armbike to increase endurance, BUE strength, and ax tolerance. Pt required x2 RB at 4.5 mins each. []   Neuromuscular Re-ed    []   Nu-step:  Time:        Level:         #Steps:       []   Rebounder:    []  Seated     []  Standing        []   Supine Ther Ex (reps/sets):     [x]   Seated Ther Ex (reps/sets):  Pt instructed in bilateral reciprocal patterned movement  to increase BUE strength for completion of ADLs and functional mobility tasks. []   Standing Ther Ex (reps/sets):     [x]   Other: Kitchen mobility training, education on hip precautions/safety during ADL tasks. Comments:    Patient/Caregiver Education and Training:   []   [employer] Equipment Use  []   Bed Mobility/Transfer Technique/Safety  [x]   Energy Conservation Tips  []   Family training  []   Postural Awareness  [x]   Safety During Functional Activities  [x]   Reinforced Patient's Precautions   []   Progress was updated and reviewed in Rehabtracker with patient and/or family this         date. Treatment Plan for Next Session: increase dynamic/static standing tolerance /balance     Assessment: This pt demonstrated a positive response to today's treatment as evidenced by tolerating therapeutic ax/ex. The patient is making good progress toward established goals as evidenced by QI scores.  Ongoing deficits are observed in the areas of incorporating posterior precautions during functional tasks, decreased energy, and activity tolerance and continued focus on these areas/deficits are recommended. Treatment/Activity Tolerance:   [x] Tolerated treatment with no adverse effects    [] Patient limited by fatigue  [] Patient limited by pain   [] Patient limited by medical complications:    [] Adverse reaction to Tx:   [] Significant change in status    Safety:       []  bed alarm set    [x]  chair alarm set    []  Pt refused alarms                []  Telesitter activated      [x]  Gait belt used during tx session      []other:       Number of Minutes/Billable Intervention  Therapeutic Exercise 60   ADL Self-care    Neuro Re-Ed    Therapeutic Activity    Group    Other:    TOTAL 60       Social History  Social/Functional History  Lives With: Alone  Type of Home: (Pt reports living at [facility])  Home Layout: One level  Home Access: Stairs to enter without rails  Entrance Stairs - Number of Steps: threshold step at the door (garage entrance)  Bathroom Shower/Tub: Tub/Shower unit, Walk-in shower, Shower chair with back(soaked in tub at baseline)  Bathroom Toilet: Standard  Bathroom Equipment: Toilet raiser  Bathroom Accessibility: (2nd bathroom c walk-in shower is w/c/fww. However pt reports use of tu/shower bathroom and is not w/c and walker accessible.)  Home Equipment: Cane  ADL Assistance: Independent  Homemaking Responsibilities: Yes  Meal Prep Responsibility: Primary  Laundry Responsibility: Primary  Cleaning Responsibility: Primary  Bill Paying/Finance Responsibility: Primary(paige Mendez)  is POA)  Shopping Responsibility: Primary  Dependent Care Responsibility: No  Health Care Management: Primary  Ambulation Assistance: Independent  Transfer Assistance: Independent  Active : No  Patient's  Info: Pt uses [employer]  Mode of Transportation: Bus  Occupation: Retired  Leisure & Hobbies: Pt reports being very busy however enjoys painting: water color and oil painting. Pt also enjoys reading.  Pt also reports enjoying walks however d/t balance concerns pt has stopped. Additional Comments: sleeps in regular,  flat bed; ~3 falls in the past year with recent fall requiring hospitalization due to injury    Objective                                                                                    Goals:  (Update in navigator)  Short term goals  Time Frame for Short term goals: STG=LTG:  Long term goals  Time Frame for Long term goals : 7-10 days or until d/c  Long term goal 1: Pt will complete feeding/grooming/oral care task c MOD I by d/c  Long term goal 2: Pt will complete total body bathing c MOD I by d/c  Long term goal 3: Pt will complete total body dressing (UB/LB/Footwear) c MOD I by d/c  Long term goal 4: Pt will complete toileting c MOD I by d/c  Long term goal 5: Pt will complete functional transfer (toilet, tub, shower) c MOD I by d/c. Long term goals 6: Pt will perform therex/therax to facilitate increased strength/endurance/ax tolerance (c emphasis on dynamic standing balance/tolerance >10 mins and BUE endurance) c MOD I in order to complete ADLs. :        Plan of Care                                                                              Times per week: 5 days per week for a minimum of 60 minutes/day plus group as appropriate for 60 minutes.   Treatment to include Plan  Times per day: Daily  Current Treatment Recommendations: Strengthening, Patient/Caregiver Education & Training, Equipment Evaluation, Education, & procurement, Self-Care / ADL, Pain Management, Balance Training, Home Management Training, Functional Mobility Training, Safety Education & Training, ROM    Electronically signed by   EDY Sifuentes OTR/L  License # [id number]    3/4/2021, 3:26 PM

## 2021-03-05 PROCEDURE — 6360000002 HC RX W HCPCS: Performed by: INTERNAL MEDICINE

## 2021-03-05 PROCEDURE — 97110 THERAPEUTIC EXERCISES: CPT

## 2021-03-05 PROCEDURE — 99232 SBSQ HOSP IP/OBS MODERATE 35: CPT | Performed by: PHYSICAL MEDICINE & REHABILITATION

## 2021-03-05 PROCEDURE — 1280000000 HC REHAB R&B

## 2021-03-05 PROCEDURE — 97530 THERAPEUTIC ACTIVITIES: CPT

## 2021-03-05 PROCEDURE — 97535 SELF CARE MNGMENT TRAINING: CPT

## 2021-03-05 PROCEDURE — 6370000000 HC RX 637 (ALT 250 FOR IP): Performed by: INTERNAL MEDICINE

## 2021-03-05 PROCEDURE — 97116 GAIT TRAINING THERAPY: CPT

## 2021-03-05 PROCEDURE — 6370000000 HC RX 637 (ALT 250 FOR IP): Performed by: PHYSICAL MEDICINE & REHABILITATION

## 2021-03-05 RX ADMIN — GABAPENTIN 100 MG: 100 CAPSULE ORAL at 09:10

## 2021-03-05 RX ADMIN — DILTIAZEM HYDROCHLORIDE 240 MG: 240 CAPSULE, COATED, EXTENDED RELEASE ORAL at 09:10

## 2021-03-05 RX ADMIN — POLYETHYLENE GLYCOL (3350) 17 G: 17 POWDER, FOR SOLUTION ORAL at 09:10

## 2021-03-05 RX ADMIN — CLONIDINE HYDROCHLORIDE 0.1 MG: 0.1 TABLET ORAL at 09:10

## 2021-03-05 RX ADMIN — OXYCODONE AND ACETAMINOPHEN 1 TABLET: 5; 325 TABLET ORAL at 13:13

## 2021-03-05 RX ADMIN — POLYETHYLENE GLYCOL (3350) 17 G: 17 POWDER, FOR SOLUTION ORAL at 21:13

## 2021-03-05 RX ADMIN — CLONIDINE HYDROCHLORIDE 0.1 MG: 0.1 TABLET ORAL at 21:13

## 2021-03-05 RX ADMIN — GABAPENTIN 100 MG: 100 CAPSULE ORAL at 21:13

## 2021-03-05 RX ADMIN — OXYCODONE AND ACETAMINOPHEN 1 TABLET: 5; 325 TABLET ORAL at 21:15

## 2021-03-05 RX ADMIN — DOCUSATE SODIUM 100 MG: 100 CAPSULE, LIQUID FILLED ORAL at 09:10

## 2021-03-05 RX ADMIN — ASPIRIN 81 MG: 81 TABLET, COATED ORAL at 09:10

## 2021-03-05 RX ADMIN — OXYCODONE AND ACETAMINOPHEN 1 TABLET: 5; 325 TABLET ORAL at 05:46

## 2021-03-05 RX ADMIN — ENOXAPARIN SODIUM 30 MG: 30 INJECTION SUBCUTANEOUS at 09:10

## 2021-03-05 RX ADMIN — SENNOSIDES 8.6 MG: 8.6 TABLET, FILM COATED ORAL at 21:13

## 2021-03-05 RX ADMIN — LOSARTAN POTASSIUM 50 MG: 50 TABLET, FILM COATED ORAL at 09:10

## 2021-03-05 ASSESSMENT — PAIN DESCRIPTION - FREQUENCY: FREQUENCY: CONTINUOUS

## 2021-03-05 ASSESSMENT — PAIN DESCRIPTION - LOCATION: LOCATION: VAGINA

## 2021-03-05 ASSESSMENT — PAIN SCALES - GENERAL
PAINLEVEL_OUTOF10: 7
PAINLEVEL_OUTOF10: 0

## 2021-03-05 ASSESSMENT — PAIN - FUNCTIONAL ASSESSMENT: PAIN_FUNCTIONAL_ASSESSMENT: ACTIVITIES ARE NOT PREVENTED

## 2021-03-05 ASSESSMENT — PAIN DESCRIPTION - PAIN TYPE: TYPE: CHRONIC PAIN

## 2021-03-05 NOTE — PROGRESS NOTES
Physical Rehabilitation: OCCUPATIONAL THERAPY     [x] daily progress note       [] discharge       Patient Name:  Bolivar Coley   :  1923 MRN: [id number]  Room:  [address] [place] [address] Date of Admission: 2021  Rehabilitation Diagnosis:   Fracture of unspecified part of neck of right femur, initial encounter for closed fracture [S72.001A]  Closed displaced fracture of right femoral neck ([address] [S72.001A]       Date 3/5/2021       Day of ARU Week:  6   Time IN/OUT 1100/1200   Individual Tx Minutes 60   Group Tx Minutes    Co-Treat Minutes    Concurrent Tx Minutes    TOTAL Tx Time Mins 60   Variance Time    Variance Time []   Refusal due to:     []   Medical hold/reason:    []   Illness   []   Off Unit for test/procedure  []   Extra time needed to complete task  []   Therapeutic need  []   Other (specify):   Restrictions Restrictions/Precautions: Fall Risk, Weight Bearing, ROM Restrictions     Hip Precautions: No hip flexion > 90 degrees, No hip internal rotation, No ADduction, Posterior hip precautions   Communication with other providers: [x]   OK to see per nursing:     []   Spoke with team member regarding:      Subjective observations and cognitive status:  patient semi fowlers upon entry, pleasant and agreeable to therapy      Pain level/location:    /10       Location: per patient no pain noted    Discharge recommendations  Anticipated discharge date:  3/13  Destination: []home alone   []home alone w assist prn   [] home w/ family    [] Continuous supervision       []SNF    [] Assisted living     [x] Other: ILF   Continued therapy: [x]HHC OT  []OUTPATIENT  OT   [] No Further OT  Equipment needs: None        ADLs:    Eating: Eating  Assistance Needed: Independent  Comment: X  CARE Score: 6  Discharge Goal: Independent       Oral Hygiene: Oral Hygiene  Assistance Needed: Independent  Comment: X  CARE Score: 6  Discharge Goal: Independent    UB/LB Bathing: Shower/Bathe Self  Assistance Needed: Supervision or touching assistance  Comment: Sup during stance to wash buttocks and melony area unilateral support to simbab rupali  CARE Score: 4  Discharge Goal: Independent    UB Dressing: Upper Body Dressing  Assistance Needed: Independent  Comment: X  CARE Score: 6  Discharge Goal: Independent         LB Dressing: Lower Body Dressing  Assistance Needed: Supervision or touching assistance  Comment: Sup vc for placement and use of reacher to thread pants; cues to steady prior to managing pants fully over hips  CARE Score: 4  Discharge Goal: Independent    Donning and [employer] Footwear: Putting On/Taking Off Footwear  Assistance Needed: Independent  Comment: using sock aide  CARE Score: 6  Discharge Goal: Independent      Toileting: Toileting Hygiene  Assistance Needed: Supervision or touching assistance  Comment: Sup vc for steadying self prior to managing pants fully over hips  CARE Score: 4  Discharge Goal: Independent      Toilet Transfers: Toilet Transfer  Assistance Needed: Supervision or touching assistance  Comment: Sup  CARE Score: 4  Discharge Goal: Independent  Device Used:    []   Standard Toilet         [x]   Grab Bars           []  Bedside Commode       [x]   Elevated Toilet          []   Other:        Bed Mobility:           []   Pt received out of bed   Rolling R/L:    Scooting:   Mod I   Supine --> Sit:  Mod I   Sit --> Supine:      Transfers:    Sit--> Stand:  Sup vc hand placement   Stand --> Sit:   Sup hand placement   Stand-Pivot:   Sup hand and body placement with directional changes   Other:    Assistive device required for transfer:   RW, Akbar zapien       Functional Mobility:  Throughout room/bathroom   Assistance:  Sup  Device:   [x]   Rolling Walker     []   Standard Walker []   Wheelchair        []   [employer]       []   4-Wheeled Orville Marlow         []   Cardiac Orville Marlow       []   Other:        Homemaking Tasks:  Sup Patient retreive clothing from [employer] on countertop and transports to bathroom prior to shower this date     Additional Therapeutic activities/exercises completed this date:     [x]   ADL Training   [x]   Balance/Postural training     [x]   Bed/Transfer Training   []   Endurance Training   []   Neuromuscular Re-ed   []   Nu-step:  Time:        Level:         #Steps:       []   Rebounder:    []  Seated     []  Standing        []   Supine Ther Ex (reps/sets):     []   Seated Ther Ex (reps/sets):     []   Standing Ther Ex (reps/sets):     []   Other:      Comments:      Patient/Caregiver Education and Training:   [x]   [employer] Equipment Use  [x]   Bed Mobility/Transfer Technique/Safety  [x]   Energy Conservation Tips  []   Family training  [x]   Postural Awareness  [x]   Safety During Functional Activities  [x]   Reinforced Patient's Precautions   []   Progress was updated and reviewed in Rehabtracker with patient and/or family this         date. Treatment Plan for Next Session: POC to continue as toelrated       Assessment: This pt demonstrated a positive   response to today's treatment The patient is making excellent  progress toward established goals as evidenced by QI scores. Ongoing deficits are observed in the areas of transfers and functional mobility  and continued focus on safety awareness during ADL tasks  is recommended.        Treatment/Activity Tolerance:   [x] Tolerated treatment with no adverse effects    [] Patient limited by fatigue  [] Patient limited by pain   [] Patient limited by medical complications:    [] Adverse reaction to Tx:   [] Significant change in status    Safety:       [x]  bed alarm set    []  chair alarm set    []  Pt refused alarms                []  Telesitter activated      [x]  Gait belt used during tx session      []other:       Number of Minutes/Billable Intervention  Therapeutic Exercise    ADL Self-care 45   Neuro Re-Ed    Therapeutic Activity 15   Group    Other:    TOTAL 60       Social History  Social/Functional History  Lives With: Alone  Type of Home: (Pt reports living at [facility])  Home Layout: One level  Home Access: Stairs to enter without rails  Entrance Stairs - Number of Steps: threshold step at the door (garage entrance)  Bathroom Shower/Tub: Tub/Shower unit, Walk-in shower, Shower chair with back(soaked in tub at baseline)  [address]: [address] [place] [address] [address]: Toilet raiser  Bathroom Accessibility: (2nd bathroom c walk-in shower is w/c/fww. However pt reports use of tu/shower bathroom and is not w/c and walker accessible.)  Home Equipment: Cane  ADL Assistance: Independent  Homemaking Responsibilities: Yes  Meal Prep Responsibility: Primary  Laundry Responsibility: Primary  Cleaning Responsibility: Primary  Bill Paying/Finance Responsibility: Primary(paige Geiger)  is POA)  Shopping Responsibility: Primary  Dependent Care Responsibility: No  Health Care Management: Primary  Ambulation Assistance: Independent  Transfer Assistance: Independent  Active : No  Patient's  Info: Pt uses [employer]  Mode of Transportation: Bus  Occupation: Retired  Leisure & Hobbies: Pt reports being very busy however enjoys painting: water color and oil painting. Pt also enjoys reading. Pt also reports enjoying walks however d/t balance concerns pt has stopped.   Additional Comments: sleeps in regular,  flat bed; ~3 falls in the past year with recent fall requiring hospitalization due to injury    Objective                                                                                    Goals:  (Update in navigator)  Short term goals  Time Frame for Short term goals: STG=LTG:  Long term goals  Time Frame for Long term goals : 7-10 days or until d/c  Long term goal 1: Pt will complete feeding/grooming/oral care task c MOD I by d/c  Long term goal 2: Pt will complete total body bathing c MOD I by d/c  Long term goal 3: Pt will complete total body dressing (UB/LB/Footwear) c MOD I by d/c  Long term goal 4: Pt will complete toileting c MOD I by d/c  Long term goal 5: Pt will complete functional transfer (toilet, tub, shower) c MOD I by d/c. Long term goals 6: Pt will perform therex/therax to facilitate increased strength/endurance/ax tolerance (c emphasis on dynamic standing balance/tolerance >10 mins and BUE endurance) c MOD I in order to complete ADLs. :        Plan of Care                                                                              Times per week: 5 days per week for a minimum of 60 minutes/day plus group as appropriate for 60 minutes.   Treatment to include Plan  Times per day: Daily  Current Treatment Recommendations: Strengthening, Patient/Caregiver Education & Training, Equipment Evaluation, Education, & procurement, Self-Care / ADL, Pain Management, Balance Training, Home Management Training, Functional Mobility Training, Safety Education & Training, ROM    Electronically signed by   BERTRAND Dee  3/5/2021, 12:05 PM

## 2021-03-05 NOTE — PROGRESS NOTES
SBA  Assistive device required for transfer:   FWW    Gait:    Distance:  253 ft with  1 standing restbreak,  90 ft with  2 turns  Assistance:  SBA  Device:  FWW  Gait Quality:  Slow pace, recip steps with  1 lob cga to recover during change if direction    Stairs   # Completed:  10  Assistance:  CGA  Supportive Device:  B rail use    Uneven Surfaces:       Assistance:    CG/SBA  Device:    FWW  Surfaces Completed:   []  Green mat with bean bags beneath      []  Throw rugs       []  Ramp       []  Outdoor pavements        []  Grass             []  Loose gravel        [x]  Other:   carpet      Additional Therapeutic activities/exercises completed this date:     []   Nu-step:  Time:        Level:         #Steps:       []   Rebounder:    []  Seated     []  Standing        []   Balance training         []   Postural training    [x]   Supine ther ex (reps/sets):  Pf/df, QS, GS, heel slide, saq, hip abd/add x 15 reps each     [x]   Seated ther ex (reps/sets):  laq x 15 reps   []   Standing ther ex (reps/sets):     []   Picking up object from floor (standing):                   []   Reacher used   []   Other:   [x]   Other:curb step with cga    Comments:      Patient/Caregiver Education and Training:   [x]   Bed Mobility/Transfer technique/safety  [x]   Gait technique/sequencing  [x]   Proper use of assistive device  []   Advanced mobility safety and technique  []   Reinforced patient's precautions/mobility while maintaining precautions  []   Postural awareness  []   Family training  []   Progress was updated and reviewed in Rehabtracker with patient and/or family this date. Treatment Plan for Next Session: transfer training, gait training     Assessment: This pt demonstrated a positive response to today's treatment as evidenced by pt completing gait and stair training.   The patient is making  progress toward established goals as evidenced by QI scores   Ongoing deficits are observed in the areas of strrength and continued focus on mobility safety is recommended. Treatment/Activity Tolerance:   [] Tolerated treatment with no adverse effects    [x] Patient limited by fatigue  [] Patient limited by pain   [] Patient limited by medical complications:    [] Adverse reaction to Tx:   [] Significant change in status    Safety:       [x]  bed alarm set    []  chair alarm set    []  Pt refused alarms                []  Telesitter activated      [x]  Gait belt used during tx session      []other:         Number of Minutes/Billable Intervention  Gait Training 30   Therapeutic Exercise 15   Neuro Re-Ed    Therapeutic Activity 15   Wheelchair Propulsion    Group    Other:    TOTAL 60         Social History  Social/Functional History  Lives With: Alone  Type of Home: (Pt reports living at [facility])  Home Layout: One level  Home Access: Stairs to enter without rails  Entrance Stairs - Number of Steps: threshold step at the door (garage entrance)  Bathroom Shower/Tub: Tub/Shower unit, Walk-in shower, Shower chair with back(soaked in tub at baseline)  Bathroom Toilet: Standard  Bathroom Equipment: Toilet raiser  Bathroom Accessibility: (2nd bathroom c walk-in shower is w/c/fww. However pt reports use of tu/shower bathroom and is not w/c and walker accessible.)  Home Equipment: Cane  ADL Assistance: Independent  Homemaking Responsibilities: Yes  Meal Prep Responsibility: Primary  Laundry Responsibility: Primary  Cleaning Responsibility: Primary  Bill Paying/Finance Responsibility: Primary(paige Sequeira)  is POA)  Shopping Responsibility: Primary  Dependent Care Responsibility: No  Health Care Management: Primary  Ambulation Assistance: Independent  Transfer Assistance: Independent  Active : No  Patient's  Info: Pt uses [employer]  Mode of Transportation: Bus  Occupation: Retired  Leisure & Hobbies: Pt reports being very busy however enjoys painting: water color and oil painting.  Pt also

## 2021-03-05 NOTE — PROGRESS NOTES
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 Persistent leg pains. No chills or cough. Prior (baseline) level of function: Independent. Current level of function:         Current  [facility] and Goals:   Occupational Therapy:    Short term goals  Time Frame for Short term goals: STG=LTG :   Long term goals  Time Frame for Long term goals : 7-10 days or until d/c  Long term goal 1: Pt will complete feeding/grooming/oral care task c MOD I by d/c  Long term goal 2: Pt will complete total body bathing c MOD I by d/c  Long term goal 3: Pt will complete total body dressing (UB/LB/Footwear) c MOD I by d/c  Long term goal 4: Pt will complete toileting c MOD I by d/c  Long term goal 5: Pt will complete functional transfer (toilet, tub, shower) c MOD I by d/c. Long term goals 6: Pt will perform therex/therax to facilitate increased strength/endurance/ax tolerance (c emphasis on dynamic standing balance/tolerance >10 mins and BUE endurance) c MOD I in order to complete ADLs.  :                                       Eating: Eating  Assistance Needed: Independent  Comment: X  CARE Score: 6  Discharge Goal: Independent       Oral Hygiene: Oral Hygiene  Assistance Needed: Independent  Comment: X  CARE Score: 6  Discharge Goal: Independent    UB/LB Bathing: Shower/Bathe Self  Assistance Needed: Supervision or touching assistance  Comment: Sup during stance to wash buttocks and melony area unilateral support to grab bars  CARE Score: 4  Discharge Goal: Independent    UB Goal: Independent  Roll Left and Right  Assistance Needed: Partial/moderate assistance(Rolling to R Min A; SBA to L with additional set-up assist of pillow between thighs to avoid adduction of R hip beyond midline)  CARE Score: 3  Discharge Goal: Independent  Lying to Sitting on Side of Bed  Assistance Needed: Supervision or touching assistance  Comment: Sup.   CARE Score: 4  Discharge Goal: Independent    Transfers:    Sit to Stand  Assistance Needed: Supervision or touching assistance  Comment: SBA using RW  CARE Score: 4  Discharge Goal: Independent  Chair/Bed-to-Chair Transfer  Assistance Needed: Supervision or touching assistance  Comment: SBA using RW  CARE Score: 4  Discharge Goal: Independent  Toilet Transfer  Assistance Needed: Partial/moderate assistance(Min A using grab bars)  CARE Score: 3  Car Transfer  Assistance Needed: Partial/moderate assistance(Min A using RW; pt was able to actively lift BLE in and out of car but required VCs on RLE positioning following posterior hip precautions)  CARE Score: 3  Discharge Goal: Independent    Ambulation:    Walking Ability  Does the Patient Walk?: Yes     Walk 10 Feet  Assistance Needed: Supervision or touching assistance  Comment: SBA using RW  CARE Score: 4  Discharge Goal: Supervision or touching assistance     Walk 50 Feet with Two Turns  Assistance Needed: Supervision or touching assistance  Comment: SBA using RW  Reason if not Attempted: Not attempted due to medical condition or safety concerns(pt was only able to tolerate 18 ft today (limited by fatigue and dizziness))  CARE Score: 4  Discharge Goal: Supervision or touching assistance     Walk 150 Feet  Assistance Needed: Supervision or touching assistance  Comment: SBA using RW; inconstistent with this performance due to dizziness; 197 ft yesterday, 137 ft at the most today  Reason if not Attempted: Not attempted due to medical condition or safety concerns  CARE Score: 4  Discharge Goal: Supervision or touching assistance     Walking 10 Feet on Uneven Surfaces  Assistance Needed: Supervision or touching assistance  Comment: SBA using RW  CARE Score: 4  Discharge Goal: Supervision or touching assistance     1 Step (Curb)  Assistance Needed: Supervision or touching assistance  Comment: SBA using RW  Reason if not Attempted: Not attempted due to medical condition or safety concerns  CARE Score: 4  Discharge Goal: Supervision or touching assistance     4 Steps  Assistance Needed: Supervision or touching assistance  Comment: SBA to ascend/descend 5 steps using railings  Reason if not Attempted: Not applicable  CARE Score: 4  Discharge Goal: Not Applicable     12 Steps  Reason if not Attempted: Not applicable  CARE Score: 9  Discharge Goal: Not Applicable       Wheelchair:  w/c Ability: Wheelchair Ability  Uses a Wheelchair and/or Scooter?: Yes  Wheel 50 Feet with Two Turns  Assistance Needed: Supervision or touching assistance(SBA-Sup)  CARE Score: 4  Discharge Goal: Independent  Wheel 150 Feet  Assistance Needed: Partial/moderate assistance(pt was only able to propel up to 75 ft with SBA-Sup; activity tolerance was limited by fatigue and worsening lethargy)  CARE Score: 3  Discharge Goal: Independent          Balance:        Object: Picking Up Object  Assistance Needed: Partial/moderate assistance(required Min A using RW and reacher)  CARE Score: 3  Discharge Goal: Independent    I      Exam:    Blood pressure (!) 134/59, pulse 67, temperature 98.1 °F (36.7 °C), temperature source Oral, resp. rate 18, height 5' 2.99\" (1.6 m), weight 108 lb 3.2 oz (49.1 kg), SpO2 95 %. General: Up in a wheelchair. Propelling it some with her arms and left leg. Alert. Oriented. HEENT: Neck supple. No JVD. Talkative. Pulmonary: No coughing, wheezing or rales. Cardiac: Regular rate and rhythm. Abdomen: Patient's abdomen is soft and nondistended. Bowel sounds were present throughout.   There was no rebound, guarding or masses noted. Upper extremities: Functional  strength allows her to manipulate the wheelchair some. Lower extremities: Right hip and thigh area remained tender to palpation but the swelling is softening. No signs of DVT. Sitting balance was good. Standing balance was poor. Lab Results   Component Value Date    WBC 7.2 03/03/2021    HGB 9.8 (L) 03/03/2021    HCT 32.1 (L) 03/03/2021    MCV 97.0 03/03/2021     03/03/2021     Lab Results   Component Value Date    INR 0.91 02/22/2021    PROTIME 11.0 (L) 02/22/2021     Lab Results   Component Value Date    CREATININE 1.1 03/03/2021    BUN 26 (H) 03/03/2021     03/03/2021    K 4.7 03/03/2021     03/03/2021    CO2 31 03/03/2021     Lab Results   Component Value Date     (H) 02/23/2021     (H) 02/23/2021    ALKPHOS 135 (H) 02/23/2021    BILITOT 1.2 (H) 02/23/2021       Expected length of stay  prior to a supervised level of function for discharge home with a walker and [address] OT/PT is 3/13/2021. Recommendations:    1. Right femoral neck fracture with hemiarthroplasty: Waxing and waning physical tolerance for the daily occupational and physical therapy.  Benefiting from the pulmonary hygiene, DVT prophylaxis and cautious pain management.  Generally continent of bowel and bladder. Very good at verbalizing her hip precautions.  Adaptive equipment education.  Verbal cues and mild to moderate physical assistance for transfers again today. 2. DVT prophylaxis: Lovenox 30 mg subcu daily.  I must monitor her hemoglobin and platelet count periodically while on this medication.  Weightbearing activities are slowly improving daily.  GI prophylaxis offered.  No signs of acute blood loss. 3. Uncontrolled hypertension:  Accepting slight supine hypertension to avoid the orthostasis. Continue with Cardizem, Catapres and Cozaar.  Target systolic blood pressure is 120-140.  Vital signs are checked at rest and with activity.  Consistent oral hydration is encouraged. Less orthostatic dizziness reported and treatment.      4. Obstipation: Working to stay ahead of her constipation with oral meds now.    Positive flatus. 5. Lumbar DDD with sciatica: Diathermy, therapeutic exercises and careful adjustments of gait training to accommodate her foot drop. The patient states her perineal pain is a part of the radiation of her sciatica from her back through her groin to her leg.   6. Nausea with emesis: Zofran is used infrequently now.  Regular bowel intervention is helping.    Symptoms are not limiting her oral intake.

## 2021-03-05 NOTE — PROGRESS NOTES
Physical Therapy  [x] daily progress note       [] discharge       Patient Name:  Reyes Humphreys   :  1923 MRN: [id number]  Room:  [address] [place] [address] Date of Admission: 2021  Rehabilitation Diagnosis:   Fracture of unspecified part of neck of right femur, initial encounter for closed fracture [S72.001A]  Closed displaced fracture of right femoral neck ([address] [S72.001A]       Date 3/5/2021       Day of ARU Week:  6   Time IN/OUT 4993-0747   Individual Tx Minutes 60   Group Tx Minutes    Co-Treat Minutes    Concurrent Tx Minutes    TOTAL Tx Time Mins 60   Variance Time    Variance Time []   Refusal due to:     []   Medical hold/reason:    []   Illness   []   Off Unit for test/procedure  []   Extra time needed to complete task  []   Therapeutic need  []   Other (specify):   Restrictions Restrictions/Precautions  Restrictions/Precautions: Fall Risk, Weight Bearing, ROM Restrictions  Position Activity Restriction  Hip Precautions: No hip flexion > 90 degrees, No hip internal rotation, No ADduction, Posterior hip precautions   Communication with other providers: [x]   OK to see per nursing:     []   Spoke with team member regarding:      Subjective observations and cognitive status: Patient in bed upon arrival.  Willing to work with PT this date.     Pain level/location:   4 /10       Location: right leg  Pain at 1/10 at end of tx   Discharge recommendations  Anticipated discharge date: 21  Destination: [x]home alone   []home alone with assist PRN     [] home w/ family      [] Continuous supervision  []SNF    [] Assisted living     [] Other:   Continued therapy: []HHC PT  []OUTPATIENT  PT   [] No Further PT  Equipment needs:          Bed Mobility:           []   Pt received out of bed   Rolling R/L:  ind   Scooting:  IND  Lying --> Sit:  Supervision  Sit --> lying:  Supervision     Transfers:    Sit--> Stand: SBA to supervision with cues on safety and hand placement  Stand --> Sit:   Supervision with ongoing cues on hand placement  Chair-->Bed/Bed --> Chair:  Supervision   Car Transfers:  NT  Toilet Transfer (if applicable): Supervision with use of grab bar and raised toilet seat  Other:    Assistive device required for transfer:  FWW    Gait:    Distance:  364'  Assistance:  Supervision   Device:  FWW  Gait Quality:  Cues on increasing knee flexion, posture and decreasing ue / shoulder tenseness        Uneven Surfaces:       Assistance:   SBA  Device:   FWW  Surfaces Completed:   []  Green mat with bean bags beneath      []  Throw rugs       []  Ramp       []  Outdoor pavements        []  Grass             []  Loose gravel        [x]  Other:   Stepping over objects on floor to work on balance, foot clearance and safety with gait with fww    Additional Therapeutic activities/exercises completed this date:     []   Nu-step:  Time:        Level:         #Steps:       []   Rebounder:    []  Seated     []  Standing        []   Balance training         []   Postural training    []   Supine ther ex (reps/sets):     [x]   Seated ther ex (reps/sets):  X 20 reps including ankle pumps, marching, laq    [x]   Standing ther ex (reps/sets):  Marching and toe raises   []   Picking up object from floor (standing):                   []   Reacher used   []   Other:   []   Other:    Comments:      Patient/Caregiver Education and Training:   [x]   Bed Mobility/Transfer technique/safety  [x]   Gait technique/sequencing  [x]   Proper use of assistive device  []   Advanced mobility safety and technique  []   Reinforced patient's precautions/mobility while maintaining precautions  [x]   Postural awareness  []   Family training  []   Progress was updated and reviewed in Rehabtracker with patient and/or family this date. Treatment Plan for Next Session: cont with gait training, endurance with gait distance, strengthening   Assessment:   This pt demonstrated a positive response to today's treatment as evidenced by willingness to work with PT.  The patient is making good progress toward established goals as evidenced by QI scores. Ongoing deficits are observed in the areas of strength and balance and continued focus on gait safety  is recommended. Treatment/Activity Tolerance:   [x] Tolerated treatment with no adverse effects    [] Patient limited by fatigue  [] Patient limited by pain   [] Patient limited by medical complications:    [] Adverse reaction to Tx:   [] Significant change in status    Safety:       [x]  bed alarm set    []  chair alarm set    []  Pt refused alarms                []  Telesitter activated      [x]  Gait belt used during tx session      []other:         Number of Minutes/Billable Intervention  Gait Training 30   Therapeutic Exercise 15   Neuro Re-Ed    Therapeutic Activity 15   Wheelchair Propulsion    Group    Other:    TOTAL 60         Social History  Social/Functional History  Lives With: Alone  Type of Home: (Pt reports living at [facility])  Home Layout: One level  Home Access: Stairs to enter without rails  Entrance Stairs - Number of Steps: threshold step at the door (garage entrance)  Bathroom Shower/Tub: Tub/Shower unit, Walk-in shower, Shower chair with back(soaked in tub at baseline)  Bathroom Toilet: Standard  Bathroom Equipment: Toilet raiser  Bathroom Accessibility: (2nd bathroom c walk-in shower is w/c/fww.  However pt reports use of tu/shower bathroom and is not w/c and walker accessible.)  Home Equipment: Cane  ADL Assistance: Independent  Homemaking Responsibilities: Yes  Meal Prep Responsibility: Primary  Laundry Responsibility: Primary  Cleaning Responsibility: Primary  Bill Paying/Finance Responsibility: Primary(paige Red)  is POA)  Shopping Responsibility: Primary  Dependent Care Responsibility: No  Health Care Management: Primary  Ambulation Assistance: Independent  Transfer Assistance: Independent  Active : No  Patient's  Info: Pt uses [facility] [employer]

## 2021-03-05 NOTE — PROGRESS NOTES
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 No discharge noted. Prior (baseline) level of function: Independent. Current level of function:         Current  IRF-[facility] and Goals:   Occupational Therapy:    Short term goals  Time Frame for Short term goals: STG=LTG :   Long term goals  Time Frame for Long term goals : 7-10 days or until d/c  Long term goal 1: Pt will complete feeding/grooming/oral care task c MOD I by d/c  Long term goal 2: Pt will complete total body bathing c MOD I by d/c  Long term goal 3: Pt will complete total body dressing (UB/LB/Footwear) c MOD I by d/c  Long term goal 4: Pt will complete toileting c MOD I by d/c  Long term goal 5: Pt will complete functional transfer (toilet, tub, shower) c MOD I by d/c. Long term goals 6: Pt will perform therex/therax to facilitate increased strength/endurance/ax tolerance (c emphasis on dynamic standing balance/tolerance >10 mins and BUE endurance) c MOD I in order to complete ADLs.  :                                       Eating: Eating  Assistance Needed: Independent  Comment: X  CARE Score: 6  Discharge Goal: Independent       Oral Hygiene: Oral Hygiene  Assistance Needed: Independent  Comment: X  CARE Score: 6  Discharge Goal: Independent    UB/LB Bathing: Shower/Bathe Self  Assistance Needed: Supervision or touching assistance  Comment: Sup  CARE Score: 4  Discharge Goal: Independent    UB Dressing: Upper Body Dressing  Assistance Needed: Independent  Comment: X  CARE Score: 6  Discharge Goal: Independent         LB Dressing: Lower Body Dressing  Assistance Needed: Supervision or touching assistance  Comment: Sup  CARE Score: 4  Discharge Goal: Independent    Donning and [place] Footwear: Putting On/Taking Off Footwear  Assistance Needed: Supervision or touching assistance  Comment: Sup using sock aide; therapist jatinder, patient recall and demo with 100% accuracy  CARE Score: 4  Discharge Goal: Independent      Toileting: Toileting Hygiene  Assistance Needed: Supervision or touching assistance  Comment: Sup in stance to manage clothing fully over hips  CARE Score: 4  Discharge Goal: Independent      Toilet Transfers: Toilet Transfer  Assistance Needed: Supervision or touching assistance  Comment: Sup  CARE Score: 4  Discharge Goal: Independent    Physical Therapy:   Short term goals  Time Frame for Short term goals: 10 tx days:  Short term goal 1: Pt will complete bed mobility tasks (rolling L/R, scooting, and sup<->sit) Ind following posterior hip precautions. Short term goal 2: Pt will complete OOB transfers using RW Mod Ind. Short term goal 3: Pt will ambulate 150 ft using RW over level surface and 10 ft over carpeted/transitional surface with SBA. Short term goal 4: Goal progressed 03/04/2021: Pt will ascend/descend curb step using RW with Sup. following appropriate step sequencing. Short term goal 5: Pt will complete object retrieval from the floor with 1UE support on RW and using reacher Mod Ind. Short term goal 6: Pt will propel manual w/c >/=150 ft Mod Ind. Short term goal 7: Goal added 03/04/2021: Pt will ascend/descend 4-5 steps using railings with Sup following appropriate step sequencing. Bed Mobility:   Sit to Lying  Assistance Needed: Supervision or touching assistance  Comment: Sup.   CARE Score: 4  Discharge Goal: Independent  Roll Left and Right  Assistance Needed: Partial/moderate assistance(Rolling to R Min A; SBA to L with additional set-up assist of pillow between thighs to avoid adduction of R hip beyond midline)  CARE Score: 3  Discharge Goal: Independent  Lying to Sitting on Side of Bed  Assistance Needed: Supervision or touching assistance  Comment: Sup.   CARE Score: 4  Discharge Goal: Independent    Transfers:    Sit to Stand  Assistance Needed: Supervision or touching assistance  Comment: SBA using RW  CARE Score: 4  Discharge Goal: Independent  Chair/Bed-to-Chair Transfer  Assistance Needed: Supervision or touching assistance  Comment: SBA using RW  CARE Score: 4  Discharge Goal: Independent  Toilet Transfer  Assistance Needed: Partial/moderate assistance(Min A using grab bars)  CARE Score: 3  Car Transfer  Assistance Needed: Partial/moderate assistance(Min A using RW; pt was able to actively lift BLE in and out of car but required VCs on RLE positioning following posterior hip precautions)  CARE Score: 3  Discharge Goal: Independent    Ambulation:    Walking Ability  Does the Patient Walk?: Yes     Walk 10 Feet  Assistance Needed: Supervision or touching assistance  Comment: SBA using RW  CARE Score: 4  Discharge Goal: Supervision or touching assistance     Walk 50 Feet with Two Turns  Assistance Needed: Supervision or touching assistance  Comment: SBA using RW  Reason if not Attempted: Not attempted due to medical condition or safety concerns(pt was only able to tolerate 18 ft today (limited by fatigue and dizziness))  CARE Score: 4  Discharge Goal: Supervision or touching assistance     Walk 150 Feet  Assistance Needed: Supervision or touching assistance  Comment: SBA using RW; inconstistent with this performance due to dizziness; 197 ft yesterday, 137 ft at the most today  Reason if not Attempted: Not attempted due to medical condition or safety concerns  CARE Score: 4  Discharge Goal: Supervision or touching assistance     Walking 10 Feet on Uneven Surfaces  Assistance Needed: Supervision or touching assistance  Comment: SBA using RW  CARE Score: 4  Discharge Goal: Supervision or touching assistance     1 Step (Curb)  Assistance Needed: Supervision or touching assistance  Comment: SBA using RW  Reason if not Attempted: Not attempted due to medical condition or safety concerns  CARE Score: 4  Discharge Goal: Supervision or touching assistance     4 Steps  Assistance Needed: Supervision or touching assistance  Comment: SBA to ascend/descend 5 steps using railings  Reason if not Attempted: Not applicable  CARE Score: 4  Discharge Goal: Not Applicable     12 Steps  Reason if not Attempted: Not applicable  CARE Score: 9  Discharge Goal: Not Applicable       Wheelchair:  w/c Ability: Wheelchair Ability  Uses a Wheelchair and/or Scooter?: Yes  Wheel 50 Feet with Two Turns  Assistance Needed: Supervision or touching assistance(SBA-Sup)  CARE Score: 4  Discharge Goal: Independent  Wheel 150 Feet  Assistance Needed: Partial/moderate assistance(pt was only able to propel up to 75 ft with SBA-Sup; activity tolerance was limited by fatigue and worsening lethargy)  CARE Score: 3  Discharge Goal: Independent          Balance:        Object: Picking Up Object  Assistance Needed: Partial/moderate assistance(required Min A using RW and reacher)  CARE Score: 3  Discharge Goal: Independent    I      Exam:    Blood pressure (!) 142/63, pulse 78, temperature 97.7 °F (36.5 °C), temperature source Oral, resp. rate 16, height 5' 2.99\" (1.6 m), weight 108 lb 3.2 oz (49.1 kg), SpO2 96 %. General: Lying back in bed. Alert. Talkative. In fair spirits. HEENT: Neck supple. Visual fields full. Speech clear. Pulmonary: No rales or rhonchi noted. Cardiac: Regular rate and rhythm. Abdomen: Patient's abdomen is soft and nondistended. Bowel sounds were present throughout. There was no rebound, guarding or masses noted. Upper extremities: Able to reach out to meet my hands with hers. Strong .     Lower extremities: Right hip and thigh movement are very limited. Staples approximate the skin edges well. No new swelling distally. Heels are clear. Sitting balance was good. Standing balance was poor. Lab Results   Component Value Date    WBC 7.2 03/03/2021    HGB 9.8 (L) 03/03/2021    HCT 32.1 (L) 03/03/2021    MCV 97.0 03/03/2021     03/03/2021     Lab Results   Component Value Date    INR 0.91 02/22/2021    PROTIME 11.0 (L) 02/22/2021     Lab Results   Component Value Date    CREATININE 1.1 03/03/2021    BUN 26 (H) 03/03/2021     03/03/2021    K 4.7 03/03/2021     03/03/2021    CO2 31 03/03/2021     Lab Results   Component Value Date     (H) 02/23/2021     (H) 02/23/2021    ALKPHOS 135 (H) 02/23/2021    BILITOT 1.2 (H) 02/23/2021       Expected length of stay  prior to a supervised level of function for discharge home with a walker and [address] OT/PT is 3/13/2021. Recommendations:    1. Right femoral neck fracture with hemiarthroplasty: More consistent with her participation in the daily occupational and physical therapy.  She is tolerating the pulmonary hygiene, DVT prophylaxis and cautious pain management. Steady oral intake noted.  Generally continent of bowel and bladder. Working to get her to incorporate hip precautions.  Adaptive equipment education.  Verbal cues and mild to moderate physical assistance for transfers today. 2. DVT prophylaxis: Lovenox 30 mg subcu daily.  I must monitor her hemoglobin and platelet count periodically while on this medication.  Weightbearing activities are slowly improving daily.  GI prophylaxis offered.  No new bruising or swelling. 3. Uncontrolled hypertension:  Accepting slight supine hypertension to avoid the orthostasis. Continue with Cardizem, Catapres and Cozaar.   Target systolic blood pressure is 120-140.  Vital signs are checked at rest and with activity.  Consistent oral hydration is encouraged.      4. Obstipation: The patient had painful distention of her abdomen upon admission to rehab.  Stat x-ray showed appropriate gas patterns and no signs of obstruction.  She received suppository and has had multiple bowel movements.  Working to stay ahead of her constipation with oral meds now.    Positive flatus. 5. Lumbar DDD with sciatica: Diathermy, therapeutic exercises and careful adjustments of gait training to accommodate her foot drop. The patient states her perineal pain is a part of the radiation of her sciatica from her back through her groin to her leg. 6. Nausea with emesis: Zofran is used infrequently now.  Regular bowel intervention is helping.    Symptoms are not limiting her oral intake.       Counseling and Coordination of Care: In care conference today I met with the patient's OT, PT, RN and . We discussed the patient's problems, progress and prognosis. Disposition issues were clarified and plans were established for ongoing rehabilitation efforts beyond the ARU stay. I reviewed this information with the patient during a second distinct visit with the patient. More than half of the total time of 33 minutes spent with the patient involved counseling and coordination of care.

## 2021-03-05 NOTE — PLAN OF CARE
Problem: Falls - Risk of:  Goal: Will remain free from falls  Description: Will remain free from falls  3/5/2021 0806 by Ericka Pereyra RN  Outcome: Ongoing  3/5/2021 0214 by Eve Lucero RN  Outcome: Ongoing  Goal: Absence of physical injury  Description: Absence of physical injury  3/5/2021 0214 by Eve Lucero RN  Outcome: Ongoing     Problem: Pain:  Goal: Pain level will decrease  Description: Pain level will decrease  3/5/2021 0806 by Ericka Pereyra RN  Outcome: Ongoing  3/5/2021 0214 by Eve Lucero RN  Outcome: Ongoing  Goal: Control of acute pain  Description: Control of acute pain  3/5/2021 0214 by Eve Lucero RN  Outcome: Ongoing  Goal: Control of chronic pain  Description: Control of chronic pain  3/5/2021 0214 by Eve Lucero RN  Outcome: Ongoing

## 2021-03-05 NOTE — PLAN OF CARE
Problem: Falls - Risk of:  Goal: Will remain free from falls  Description: Will remain free from falls  3/5/2021 0214 by Jonathan Lopez RN  Outcome: Ongoing  3/4/2021 1515 by Ronnell Tolbert RN  Outcome: Ongoing  Goal: Absence of physical injury  Description: Absence of physical injury  3/5/2021 0214 by Jonathan Lopez RN  Outcome: Ongoing  3/4/2021 1515 by Ronnell Tolbert RN  Outcome: Ongoing     Problem: Pain:  Goal: Pain level will decrease  Description: Pain level will decrease  3/5/2021 0214 by Jonathan Lopez RN  Outcome: Ongoing  3/4/2021 1515 by Ronnell Tolbert RN  Outcome: Ongoing  Goal: Control of acute pain  Description: Control of acute pain  3/5/2021 0214 by Jonathan oLpez RN  Outcome: Ongoing  3/4/2021 1515 by Ronnell Tolbert RN  Outcome: Ongoing  Goal: Control of chronic pain  Description: Control of chronic pain  3/5/2021 0214 by Jonathan Lopez RN  Outcome: Ongoing  3/4/2021 1515 by Ronnell Tolbert RN  Outcome: Ongoing

## 2021-03-05 NOTE — PROGRESS NOTES
Comprehensive Nutrition Assessment    Type and Reason for Visit:  Reassess    Nutrition Recommendations/Plan:   Continue general diet with oral nutrition supplements during stay   Assist with meals as needed  Will continue to follow up during stay     Nutrition Assessment:  Remains on general diet with meal intake usually %. Offered supplements, frozen and standard, willing to take some. Improving intake this week, moderate nutrition risk at this time. Malnutrition Assessment:  Malnutrition Status: At risk for malnutrition (Comment)    Context:  Acute Illness       Estimated Daily Nutrient Needs:  Energy (kcal):  5786-7852 (27-30 shalonda/kg); Weight Used for Energy Requirements:  Current     Protein (g):  75 (1.5 g/kg);  Weight Used for Protein Requirements:  Current        Fluid (ml/day):  6322-2444; Method Used for Fluid Requirements:  1 ml/kcal      Nutrition Related Findings:  up in bed eating lunch, Last BM was today      Wounds:  Surgical Incision       Current Nutrition Therapies:    DIET GENERAL;  Dietary Nutrition Supplements: Frozen Oral Supplement  Dietary Nutrition Supplements: Standard High Calorie Oral Supplement    Anthropometric Measures:  · Height: 5' 2.99\" (160 cm)  · Current Body Weight: 108 lb 3.9 oz (49.1 kg)   · Admission Body Weight:      · Usual Body Weight: (per pt 100#)     · Ideal Body Weight: 115 lbs; % Ideal Body Weight 96.4 %   · BMI: 19.2  · Adjusted Body Weight:  ; No Adjustment   · BMI Categories: Underweight (BMI less than 22) age over 72       Nutrition Diagnosis:   · Predicted inadequate energy intake related to increase demand for energy/nutrients as evidenced by BMI, wounds      Nutrition Interventions:   Food and/or Nutrient Delivery:  Continue Current Diet, Continue Oral Nutrition Supplement  Nutrition Education/Counseling:  No recommendation at this time   Coordination of Nutrition Care:  Continue to monitor while inpatient, Feeding Assistance/Environment Change    Goals:  Patient will consume at least 50-75% at meals during stay       Nutrition Monitoring and Evaluation:   Behavioral-Environmental Outcomes:  None Identified   Food/Nutrient Intake Outcomes:  Diet Advancement/Tolerance, Food and Nutrient Intake, Supplement Intake  Physical Signs/Symptoms Outcomes:  Biochemical Data, Chewing or Swallowing, Meal Time Behavior, Skin, Weight, Constipation     Discharge Planning:    Continue current diet     Electronically signed by Abelino Severin, RD, LD on 3/5/21 at 2:10 PM EST    Contact: [phone]

## 2021-03-05 NOTE — CARE COORDINATION
Case mgt spoke with patient's niece Mary Ellen Javier @[phone]. Very pleased with d/c date and plan. She is wondering if the patient had an \"episode\" that led to her fall and fracture. Case mgt shared that no brain imaging has been done by [facility]. Mary Ellen Javier attends all PCP appts with the patient - case mgt suggested she bring up her general \"circulation\" concerns with PCP. She voiced understanding. Mary Ellen Javier will provide d/c transport and transport to all followup appts.

## 2021-03-06 PROCEDURE — 1280000000 HC REHAB R&B

## 2021-03-06 PROCEDURE — 6370000000 HC RX 637 (ALT 250 FOR IP): Performed by: INTERNAL MEDICINE

## 2021-03-06 PROCEDURE — 94761 N-INVAS EAR/PLS OXIMETRY MLT: CPT

## 2021-03-06 PROCEDURE — 6370000000 HC RX 637 (ALT 250 FOR IP): Performed by: PHYSICAL MEDICINE & REHABILITATION

## 2021-03-06 PROCEDURE — 94150 VITAL CAPACITY TEST: CPT

## 2021-03-06 PROCEDURE — 6360000002 HC RX W HCPCS: Performed by: INTERNAL MEDICINE

## 2021-03-06 RX ADMIN — CLONIDINE HYDROCHLORIDE 0.1 MG: 0.1 TABLET ORAL at 09:21

## 2021-03-06 RX ADMIN — ENOXAPARIN SODIUM 30 MG: 30 INJECTION SUBCUTANEOUS at 09:21

## 2021-03-06 RX ADMIN — DOCUSATE SODIUM 100 MG: 100 CAPSULE, LIQUID FILLED ORAL at 09:21

## 2021-03-06 RX ADMIN — OXYCODONE AND ACETAMINOPHEN 1 TABLET: 5; 325 TABLET ORAL at 06:31

## 2021-03-06 RX ADMIN — SENNOSIDES 8.6 MG: 8.6 TABLET, FILM COATED ORAL at 21:10

## 2021-03-06 RX ADMIN — GABAPENTIN 100 MG: 100 CAPSULE ORAL at 09:21

## 2021-03-06 RX ADMIN — CLONIDINE HYDROCHLORIDE 0.1 MG: 0.1 TABLET ORAL at 21:10

## 2021-03-06 RX ADMIN — LOSARTAN POTASSIUM 50 MG: 50 TABLET, FILM COATED ORAL at 09:21

## 2021-03-06 RX ADMIN — ASPIRIN 81 MG: 81 TABLET, COATED ORAL at 09:21

## 2021-03-06 RX ADMIN — DILTIAZEM HYDROCHLORIDE 240 MG: 240 CAPSULE, COATED, EXTENDED RELEASE ORAL at 09:21

## 2021-03-06 RX ADMIN — OXYCODONE AND ACETAMINOPHEN 1 TABLET: 5; 325 TABLET ORAL at 21:09

## 2021-03-06 RX ADMIN — GABAPENTIN 100 MG: 100 CAPSULE ORAL at 21:09

## 2021-03-06 ASSESSMENT — PAIN SCALES - GENERAL: PAINLEVEL_OUTOF10: 7

## 2021-03-06 ASSESSMENT — PAIN DESCRIPTION - PAIN TYPE: TYPE: CHRONIC PAIN

## 2021-03-06 ASSESSMENT — PAIN DESCRIPTION - DESCRIPTORS: DESCRIPTORS: DISCOMFORT

## 2021-03-07 PROCEDURE — 6370000000 HC RX 637 (ALT 250 FOR IP): Performed by: PHYSICAL MEDICINE & REHABILITATION

## 2021-03-07 PROCEDURE — 94761 N-INVAS EAR/PLS OXIMETRY MLT: CPT

## 2021-03-07 PROCEDURE — 94150 VITAL CAPACITY TEST: CPT

## 2021-03-07 PROCEDURE — 6370000000 HC RX 637 (ALT 250 FOR IP): Performed by: INTERNAL MEDICINE

## 2021-03-07 PROCEDURE — 1280000000 HC REHAB R&B

## 2021-03-07 PROCEDURE — 6360000002 HC RX W HCPCS: Performed by: INTERNAL MEDICINE

## 2021-03-07 RX ADMIN — CLONIDINE HYDROCHLORIDE 0.1 MG: 0.1 TABLET ORAL at 19:55

## 2021-03-07 RX ADMIN — POLYETHYLENE GLYCOL (3350) 17 G: 17 POWDER, FOR SOLUTION ORAL at 19:55

## 2021-03-07 RX ADMIN — DOCUSATE SODIUM 100 MG: 100 CAPSULE, LIQUID FILLED ORAL at 08:47

## 2021-03-07 RX ADMIN — SENNOSIDES 8.6 MG: 8.6 TABLET, FILM COATED ORAL at 19:55

## 2021-03-07 RX ADMIN — ENOXAPARIN SODIUM 30 MG: 30 INJECTION SUBCUTANEOUS at 08:46

## 2021-03-07 RX ADMIN — ASPIRIN 81 MG: 81 TABLET, COATED ORAL at 08:47

## 2021-03-07 RX ADMIN — DILTIAZEM HYDROCHLORIDE 240 MG: 240 CAPSULE, COATED, EXTENDED RELEASE ORAL at 08:47

## 2021-03-07 RX ADMIN — GABAPENTIN 100 MG: 100 CAPSULE ORAL at 08:47

## 2021-03-07 RX ADMIN — LOSARTAN POTASSIUM 50 MG: 50 TABLET, FILM COATED ORAL at 08:47

## 2021-03-07 RX ADMIN — CLONIDINE HYDROCHLORIDE 0.1 MG: 0.1 TABLET ORAL at 08:47

## 2021-03-07 RX ADMIN — GABAPENTIN 100 MG: 100 CAPSULE ORAL at 19:55

## 2021-03-07 RX ADMIN — OXYCODONE AND ACETAMINOPHEN 1 TABLET: 5; 325 TABLET ORAL at 08:47

## 2021-03-07 NOTE — PROGRESS NOTES
Pt reports she was eating pasta for dinner and it got \"stuck part way down\". She tried taking sips of water and she spit it back up. She now reports that it is improving and feels like it is \"moving\". Pt does not appear to be in any distress and in not having any difficulty breathing or moving air. Dr Albert Cushing updated. If it continues, he will consider placing an IV and treating with Reglan IV.

## 2021-03-08 PROCEDURE — 6370000000 HC RX 637 (ALT 250 FOR IP): Performed by: INTERNAL MEDICINE

## 2021-03-08 PROCEDURE — 97530 THERAPEUTIC ACTIVITIES: CPT

## 2021-03-08 PROCEDURE — 99232 SBSQ HOSP IP/OBS MODERATE 35: CPT | Performed by: PHYSICAL MEDICINE & REHABILITATION

## 2021-03-08 PROCEDURE — 94150 VITAL CAPACITY TEST: CPT

## 2021-03-08 PROCEDURE — 1280000000 HC REHAB R&B

## 2021-03-08 PROCEDURE — 97110 THERAPEUTIC EXERCISES: CPT

## 2021-03-08 PROCEDURE — 94761 N-INVAS EAR/PLS OXIMETRY MLT: CPT

## 2021-03-08 PROCEDURE — 6370000000 HC RX 637 (ALT 250 FOR IP): Performed by: PHYSICAL MEDICINE & REHABILITATION

## 2021-03-08 PROCEDURE — 97116 GAIT TRAINING THERAPY: CPT

## 2021-03-08 PROCEDURE — 6360000002 HC RX W HCPCS: Performed by: INTERNAL MEDICINE

## 2021-03-08 RX ORDER — OXYCODONE HYDROCHLORIDE AND ACETAMINOPHEN 5; 325 MG/1; MG/1
1 TABLET ORAL ONCE
Status: COMPLETED | OUTPATIENT
Start: 2021-03-08 | End: 2021-03-08

## 2021-03-08 RX ADMIN — ASPIRIN 81 MG: 81 TABLET, COATED ORAL at 07:56

## 2021-03-08 RX ADMIN — ENOXAPARIN SODIUM 30 MG: 30 INJECTION SUBCUTANEOUS at 07:56

## 2021-03-08 RX ADMIN — DILTIAZEM HYDROCHLORIDE 240 MG: 240 CAPSULE, COATED, EXTENDED RELEASE ORAL at 07:56

## 2021-03-08 RX ADMIN — POLYETHYLENE GLYCOL (3350) 17 G: 17 POWDER, FOR SOLUTION ORAL at 07:55

## 2021-03-08 RX ADMIN — DOCUSATE SODIUM 100 MG: 100 CAPSULE, LIQUID FILLED ORAL at 07:56

## 2021-03-08 RX ADMIN — CLONIDINE HYDROCHLORIDE 0.1 MG: 0.1 TABLET ORAL at 19:51

## 2021-03-08 RX ADMIN — OXYCODONE HYDROCHLORIDE AND ACETAMINOPHEN 1 TABLET: 5; 325 TABLET ORAL at 21:56

## 2021-03-08 RX ADMIN — ACETAMINOPHEN 650 MG: 325 TABLET ORAL at 19:50

## 2021-03-08 RX ADMIN — ACETAMINOPHEN 650 MG: 325 TABLET ORAL at 06:53

## 2021-03-08 RX ADMIN — GABAPENTIN 100 MG: 100 CAPSULE ORAL at 07:56

## 2021-03-08 RX ADMIN — LOSARTAN POTASSIUM 50 MG: 50 TABLET, FILM COATED ORAL at 07:56

## 2021-03-08 RX ADMIN — CLONIDINE HYDROCHLORIDE 0.1 MG: 0.1 TABLET ORAL at 07:56

## 2021-03-08 RX ADMIN — SENNOSIDES 8.6 MG: 8.6 TABLET, FILM COATED ORAL at 19:50

## 2021-03-08 RX ADMIN — GABAPENTIN 100 MG: 100 CAPSULE ORAL at 19:50

## 2021-03-08 ASSESSMENT — PAIN DESCRIPTION - LOCATION: LOCATION: VAGINA

## 2021-03-08 ASSESSMENT — PAIN DESCRIPTION - PAIN TYPE: TYPE: CHRONIC PAIN

## 2021-03-08 ASSESSMENT — PAIN DESCRIPTION - DESCRIPTORS: DESCRIPTORS: PATIENT UNABLE TO DESCRIBE

## 2021-03-08 ASSESSMENT — PAIN SCALES - GENERAL: PAINLEVEL_OUTOF10: 9

## 2021-03-08 NOTE — FLOWSHEET NOTE
[x] daily progress note       [] discharge       Patient Name:  Dannielle Sutherland   :  1923 MRN: [id number]  Room:  [address] [place] [address] Date of Admission: 2021  Rehabilitation Diagnosis:   Fracture of unspecified part of neck of right femur, initial encounter for closed fracture [S72.001A]  Closed displaced fracture of right femoral neck ([address] [S72.001A]       Date 3/8/2021       Day of ARU Week:  2   Time IN/OUT 2860-7692   Individual Tx Minutes 50   TOTAL Tx Time Mins 50   Variance Time +5 minutes   Variance Time []   Refusal due to:     []   Medical hold/reason:    []   Illness   []   Off Unit for test/procedure  [x]   Extra time needed to complete task  []   Therapeutic need  []   Other (specify):   Restrictions Restrictions/Precautions  Restrictions/Precautions: Fall Risk, Weight Bearing, ROM Restrictions  Position Activity Restriction  Hip Precautions: No hip flexion > 90 degrees, No hip internal rotation, No ADduction, Posterior hip precautions   Communication with other providers: [x]   OK to see per nursing:     []   Spoke with team member regarding:      Subjective observations and cognitive status: Pt seen in semi-link's position in bed at beginning of treatment. Agreeable to therapy. Pain level/location:    1/10       Location: right hip    Discharge recommendations  Anticipated discharge date:  2021  Destination: []?home alone   []?home alone with assist PRN     []? home w/ family      []? Continuous supervision  []? SNF    []? Assisted living     []? Other:   Continued therapy: []?C PT  []? OUTPATIENT  PT   []?  No Further PT  Equipment needs: RW and manual w/c     Additional Therapeutic activities/exercises completed this date:     []   Nu-step:  Time:        Level:         #Steps:       []   Rebounder:    []  Seated     []  Standing        []   Balance training         []   Postural training    [x]   Supine ther ex (reps/sets): BLE exercises ankle pumps, quad sets, buttock squeezes, heel slides, SAQs, hip abduction x 20 reps each for strengthening. []   Seated ther ex (reps/sets):     []   Standing ther ex (reps/sets):     []   Picking up object from floor (standing):                   []   Reacher used   [x]   Other: Educated patient on importance of nutrition for healing purposes. Pt reported that she has not been eating well d/t decreased appetite. []   Other:    Patient/Caregiver Education and Training:   []   Bed Mobility/Transfer technique/safety  []   Gait technique/sequencing  []   Proper use of assistive device  []   Advanced mobility safety and technique  [x]   Reinforced patient's precautions/mobility while maintaining precautions  []   Postural awareness  []   Family training  [x]   Progress was updated in Rehabtracker this date. Treatment Plan for Next Session: gait; transfers; advanced gait; stair training; exercises     Assessment: This pt demonstrated a positive response to today's treatment as evidenced by improved form with exercises. The patient is making improved progress toward established goals as evidenced by QI scores. Ongoing deficits are observed in the areas of strength and continued focus on strength is recommended. Treatment/Activity Tolerance:   [x] Tolerated treatment with no adverse effects    [] Patient limited by fatigue  [] Patient limited by pain   [] Patient limited by medical complications:    [] Adverse reaction to Tx:   [] Significant change in status    Safety:       [x]  bed alarm set    []  chair alarm set    []  Pt refused alarms                []  Telesitter activated      [x]  Gait belt used during tx session      [x]other: pt left in semi-link's position in bed with call light at end of treatment.          Number of Minutes/Billable Intervention  Gait Training 50   Therapeutic Exercise    Neuro Re-Ed    Therapeutic Activity    Wheelchair Propulsion    Group    Other:    TOTAL 50         Social History  Social/Functional History  Lives With: Alone  Type of Home: (Pt reports living at [facility])  Home Layout: One level  Home Access: Stairs to enter without rails  Entrance Stairs - Number of Steps: threshold step at the door (garage entrance)  Bathroom Shower/Tub: Tub/Shower unit, Walk-in shower, Shower chair with back(soaked in tub at baseline)  [address]: Standard  Bathroom Equipment: Toilet raiser  Bathroom Accessibility: (2nd bathroom c walk-in shower is w/c/fww. However pt reports use of tu/shower bathroom and is not w/c and walker accessible.)  Home Equipment: Cane  ADL Assistance: Independent  Homemaking Responsibilities: Yes  Meal Prep Responsibility: Primary  Laundry Responsibility: Primary  Cleaning Responsibility: Primary  Bill Paying/Finance Responsibility: Primary(paige Lebron)  is POA)  Shopping Responsibility: Primary  Dependent Care Responsibility: No  Health Care Management: Primary  Ambulation Assistance: Independent  Transfer Assistance: Independent  Active : No  Patient's  Info: Pt uses [employer]  Mode of Transportation: Bus  Occupation: Retired  Leisure & Hobbies: Pt reports being very busy however enjoys painting: water color and oil painting. Pt also enjoys reading. Pt also reports enjoying walks however d/t balance concerns pt has stopped. Additional Comments: sleeps in regular,  flat bed; ~3 falls in the past year with recent fall requiring hospitalization due to injury    Objective                                                                                    Goals:  (Update in navigator)  Short term goals  Time Frame for Short term goals: 10 tx days:  Short term goal 1: Pt will complete bed mobility tasks (rolling L/R, scooting, and sup<->sit) Ind following posterior hip precautions. Short term goal 2: Pt will complete OOB transfers using RW Mod Ind.   Short term goal 3: Pt will ambulate 150 ft using RW over level surface and 10 ft over carpeted/transitional surface with SBA. Short term goal 4: Goal progressed 03/04/2021: Pt will ascend/descend curb step using RW with Sup. following appropriate step sequencing. Short term goal 5: Pt will complete object retrieval from the floor with 1UE support on RW and using reacher Mod Ind. Short term goal 6: Pt will propel manual w/c >/=150 ft Mod Ind. Short term goal 7: Goal added 03/04/2021: Pt will ascend/descend 4-5 steps using railings with Sup following appropriate step sequencing.:   :        Plan of Care                                                                              Times per week: 5 days per week for a minimum of 60 minutes/day plus group as appropriate for 60 minutes.   Treatment to include Current Treatment Recommendations: Strengthening, Gait Training, Patient/Caregiver Education & Training, ROM, Equipment Evaluation, Education, & procurement, Balance Training, Pain Management, Functional Mobility Training, Endurance Training, Home Exercise Program, Positioning, Transfer Training, Safety Education & Training, Wheelchair Mobility Training    Electronically signed by   SUGEY [id number]  3/8/2021, 3:08 PM

## 2021-03-08 NOTE — PROGRESS NOTES
Occupational Therapy  Physical Rehabilitation: OCCUPATIONAL THERAPY     [x] daily progress note       [] discharge       Patient Name:  Dneny Flores   :  1923 MRN: [id number]  Room:  [address] [place] [address] Date of Admission: 2021  Rehabilitation Diagnosis:   Fracture of unspecified part of neck of right femur, initial encounter for closed fracture [S72.001A]  Closed displaced fracture of right femoral neck ([address] [S72.001A]       Date 3/8/2021       Day of ARU Week:  2   Time IN/OUT 0957-0051   Individual Tx Minutes 64   Group Tx Minutes    Co-Treat Minutes    Concurrent Tx Minutes    TOTAL Tx Time Mins 64   Variance Time Per PTA Anson made aware of increased therapy time required for OT session. Variance Time []   Refusal due to:     []   Medical hold/reason:    []   Illness   []   Off Unit for test/procedure  [x]   Extra time needed to complete task  [x]   Therapeutic need  []   Other (specify):   Restrictions Restrictions/Precautions: Fall Risk, Weight Bearing, ROM Restrictions     Hip Precautions: No hip flexion > 90 degrees, No hip internal rotation, No ADduction, Posterior hip precautions   Communication with other providers: [x]   OK to see per nursing:     []   Spoke with team member regarding:      Subjective observations and cognitive status:  Pt in semi-fowlers upon arrival. Pt reports discomfort near vaginal area. Pt reports, Maggijorge Martin said I have a compressed area on my lower back, and the nerves are being affected so I think that's what's bothering me. It feels sensitive with certain clothes like my depends. The padding sometimes bothers it. \"      At end of therapy session pt reported increased depressed feelings with therapist. Pt shared concerns for returning home alone. Pt and concerns validated. Pt educated on progress made as well as provided with resources for assistance c emotional status and concerns. Rebeca services as well as assistance from case management provided.  Followed up c SALOME Wild to relay concerns to Noreen. Pain level/location:   1 /10       Location: vaginal area   Discharge recommendations  Anticipated discharge date:  3/13  Destination: []home alone   []home alone w assist prn   [] home w/ family    [] Continuous supervision       []SNF    [x] Assisted living     [] Other:   Continued therapy: [x]HHC OT  []OUTPATIENT  OT   [] No Further OT   Equipment needs: none, pt reports having shower chair in walk in bathroom. Bed Mobility:           [x]   Pt received out of bed   Scooting:  MOD I  Supine --> Sit:  MOD I  Sit --> Supine:  MOD I    Transfers:    Sit--> Stand:  SBA  Stand --> Sit:   SBA  Stand-Pivot:   SBA  Other:    Assistive device required for transfer:   FWW      Functional Mobility:    Assistance: To therapy kitchen c SBA  Device:   [x]   Rolling Walker     []   Standard Walker []   Wheelchair        []   [employer]       []   Feliciano Ditch         []   Cardiac Walker       []   Other:        Homemaking Tasks:   Pt completed kitchen mobility task/item retrieval/return from high/waste level places to facilitate independence and safety in the kitchen. Pt also educated on posterior hip precautions compliance during functional tasks. Pt utilized reacher and walker bag during retrieval tasks and demo good carry over of body/FWW placement.        Additional Therapeutic activities/exercises completed this date:     []   ADL Training   [x]   Balance/Postural training     []   Bed/Transfer Training   [x]   Endurance Training   []   Neuromuscular Re-ed   []   Nu-step:  Time:        Level:         #Steps:       []   Rebounder:    []  Seated     []  Standing        []   Supine Ther Ex (reps/sets):     []   Seated Ther Ex (reps/sets):     []   Standing Ther Ex (reps/sets):     []   Other:      Comments:      Patient/Caregiver Education and Training:   [x]   [employer] Equipment Use  []   Bed Mobility/Transfer Technique/Safety  [x]   Energy Conservation Tips  []   Family training  [] Postural Awareness  [x]   Safety During Functional Activities  [x]   Reinforced Patient's Precautions   []   Progress was updated and reviewed in Rehabtracker with patient and/or family this         date. Treatment Plan for Next Session: increase carry over of posterior hip precaution compliance during functional tasks. Assessment: This pt demonstrated a positive response to today's treatment as evidenced by activity tolerance. The patient is making good progress toward established goals as evidenced by QI scores. Ongoing deficits are observed in the areas of safety/carry over of posterior hip precautions during functional tasks and continued focus on compliance is recommended. Treatment/Activity Tolerance:   [x] Tolerated treatment with no adverse effects    [] Patient limited by fatigue  [] Patient limited by pain   [] Patient limited by medical complications:    [] Adverse reaction to Tx:   [] Significant change in status    Safety:       [x]  bed alarm set    []  chair alarm set    []  Pt refused alarms                []  Telesitter activated      [x]  Gait belt used during tx session      []other:       Number of Minutes/Billable Intervention  Therapeutic Exercise 44   ADL Self-care    Neuro Re-Ed    Therapeutic Activity 20   Group    Other:    TOTAL 64       Social History  Social/Functional History  Lives With: Alone  Type of Home: (Pt reports living at [facility])  Home Layout: One level  Home Access: Stairs to enter without rails  Entrance Stairs - Number of Steps: threshold step at the door (garage entrance)  Bathroom Shower/Tub: Tub/Shower unit, Walk-in shower, Shower chair with back(soaked in tub at baseline)  Bathroom Toilet: Standard  Bathroom Equipment: Toilet raiser  Bathroom Accessibility: (2nd bathroom c walk-in shower is w/c/fww.  However pt reports use of tu/shower bathroom and is not w/c and walker accessible.)  Home Equipment: Cane  ADL Assistance: Independent  Homemaking Responsibilities: Yes  Meal Prep Responsibility: Primary  Laundry Responsibility: Primary  Cleaning Responsibility: Primary  Bill Paying/Finance Responsibility: Primary([place])  is POA)  Shopping Responsibility: Primary  Dependent Care Responsibility: No  Health Care Management: Primary  Ambulation Assistance: Independent  Transfer Assistance: Independent  Active : No  Patient's  Info: Pt uses [employer] [facility] [employer]  Mode of Transportation: Bus  Occupation: Retired  Leisure & Hobbies: Pt reports being very busy however enjoys painting: water color and oil painting. Pt also enjoys reading. Pt also reports enjoying walks however d/t balance concerns pt has stopped. Additional Comments: sleeps in regular,  flat bed; ~3 falls in the past year with recent fall requiring hospitalization due to injury    Objective                                                                                    Goals:  (Update in navigator)  Short term goals  Time Frame for Short term goals: STG=LTG:  Long term goals  Time Frame for Long term goals : 7-10 days or until d/c  Long term goal 1: Pt will complete feeding/grooming/oral care task c MOD I by d/c  Long term goal 2: Pt will complete total body bathing c MOD I by d/c  Long term goal 3: Pt will complete total body dressing (UB/LB/Footwear) c MOD I by d/c  Long term goal 4: Pt will complete toileting c MOD I by d/c  Long term goal 5: Pt will complete functional transfer (toilet, tub, shower) c MOD I by d/c. Long term goals 6: Pt will perform therex/therax to facilitate increased strength/endurance/ax tolerance (c emphasis on dynamic standing balance/tolerance >10 mins and BUE endurance) c MOD I in order to complete ADLs. :        Plan of Care                                                                              Times per week: 5 days per week for a minimum of 60 minutes/day plus group as appropriate for 60 minutes.   Treatment to include Plan  Times per day: Daily  Current Treatment Recommendations: Strengthening, Patient/Caregiver Education & Training, Equipment Evaluation, Education, & procurement, Self-Care / ADL, Pain Management, Balance Training, Home Management Training, Functional Mobility Training, Safety Education & Training, ROM    Electronically signed by   EDY Bartlett, OTR/L  License # [id number]    3/8/2021, 4:15 PM

## 2021-03-08 NOTE — PLAN OF CARE
Problem: Falls - Risk of:  Goal: Will remain free from falls  Description: Will remain free from falls  3/8/2021 0953 by Dontrell Allen RN  Outcome: Ongoing  3/8/2021 0151 by Cristine Hassan RN  Outcome: Ongoing  Goal: Absence of physical injury  Description: Absence of physical injury  3/8/2021 0953 by Dontrell Allen RN  Outcome: Ongoing  3/8/2021 0151 by Cristine Hassan RN  Outcome: Ongoing     Problem: Pain:  Goal: Pain level will decrease  Description: Pain level will decrease  3/8/2021 0953 by Dontrell Allen RN  Outcome: Ongoing  3/8/2021 0151 by Cristine Hassan RN  Outcome: Ongoing  Goal: Control of acute pain  Description: Control of acute pain  3/8/2021 0953 by Dontrell Allen RN  Outcome: Ongoing  3/8/2021 0151 by Cristine Hassan RN  Outcome: Ongoing  Goal: Control of chronic pain  Description: Control of chronic pain  3/8/2021 0953 by Dontrell Allen RN  Outcome: Ongoing  3/8/2021 0151 by Cristine Hassan RN  Outcome: Ongoing  Goal: Patient's pain/discomfort is manageable  Description: Patient's pain/discomfort is manageable  3/8/2021 0953 by Dontrell Allen RN  Outcome: Ongoing  3/8/2021 0151 by Cristine Hassan RN  Outcome: Ongoing     Problem: Infection:  Goal: Will remain free from infection  Description: Will remain free from infection  3/8/2021 0953 by Dontrell Allen RN  Outcome: Ongoing  3/8/2021 0151 by Cristine Hassan RN  Outcome: Ongoing     Problem: Safety:  Goal: Free from accidental physical injury  Description: Free from accidental physical injury  3/8/2021 0953 by Dontrell Allen RN  Outcome: Ongoing  3/8/2021 0151 by Cristine Hassan RN  Outcome: Ongoing  Goal: Free from intentional harm  Description: Free from intentional harm  3/8/2021 0953 by Dontrell Allen RN  Outcome: Ongoing  3/8/2021 0151 by Cristine Hassan RN  Outcome: Ongoing     Problem: Daily Care:  Goal: Daily care needs are met  Description: Daily care needs are met  3/8/2021 0953 by Dontrell Allen, RN  Outcome: Ongoing  3/8/2021 0151 by Tra Cevallos RN  Outcome: Ongoing     Problem: Skin Integrity:  Goal: Skin integrity will stabilize  Description: Skin integrity will stabilize  3/8/2021 0953 by Geri Hopson RN  Outcome: Ongoing  3/8/2021 0151 by Tra Cevallos RN  Outcome: Ongoing     Problem: Discharge Planning:  Goal: Patients continuum of care needs are met  Description: Patients continuum of care needs are met  3/8/2021 0953 by Geri Hopson RN  Outcome: Ongoing  3/8/2021 0151 by Tra Cevallos RN  Outcome: Ongoing

## 2021-03-08 NOTE — PROGRESS NOTES
After taking couple p.m. meds, pt trying to clear throat. Encouraged pt to tuck chin when swallowing. Pt able to take remainder of meds without difficulty, stating was able to swallow meds comfortably. Will continue to monitor.

## 2021-03-08 NOTE — PROGRESS NOTES
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 Prior (baseline) level of function: Independent. Current level of function:         Current  IRF-MUSTAPHA and Goals:   Occupational Therapy:    Short term goals  Time Frame for Short term goals: STG=LTG :   Long term goals  Time Frame for Long term goals : 7-10 days or until d/c  Long term goal 1: Pt will complete feeding/grooming/oral care task c MOD I by d/c  Long term goal 2: Pt will complete total body bathing c MOD I by d/c  Long term goal 3: Pt will complete total body dressing (UB/LB/Footwear) c MOD I by d/c  Long term goal 4: Pt will complete toileting c MOD I by d/c  Long term goal 5: Pt will complete functional transfer (toilet, tub, shower) c MOD I by d/c. Long term goals 6: Pt will perform therex/therax to facilitate increased strength/endurance/ax tolerance (c emphasis on dynamic standing balance/tolerance >10 mins and BUE endurance) c MOD I in order to complete ADLs.  :                                       Eating: Eating  Assistance Needed: Independent  Comment: X  CARE Score: 6  Discharge Goal: Independent       Oral Hygiene: Oral Hygiene  Assistance Needed: Independent  Comment: X  CARE Score: 6  Discharge Goal: Independent    UB/LB Bathing: Shower/Bathe Self  Assistance Needed: Supervision or touching assistance  Comment: Sup during stance to wash buttocks and melony area unilateral support to grab bars  CARE Score: 4  Discharge Goal: Independent    UB Dressing: Upper Body Dressing  Assistance Needed: Independent  Comment: X  CARE Score: 6  Discharge Goal: Independent         LB Dressing: Lower Body Dressing  Assistance Needed: Supervision or touching assistance  Comment: Sup vc for placement and use of reacher to thread pants; cues to steady prior to managing pants fully over hips  CARE Score: 4  Discharge Goal: Independent    Donning and [place] Footwear: Putting On/Taking Off Footwear  Assistance Needed: Independent  Comment: using sock aide  CARE Score: 6  Discharge Goal: Independent      Toileting: Toileting Hygiene  Assistance Needed: Supervision or touching assistance  Comment: Sup vc for steadying self prior to managing pants fully over hips  CARE Score: 4  Discharge Goal: Independent      Toilet Transfers: Toilet Transfer  Assistance Needed: Supervision or touching assistance  Comment: Sup cues for hand placement  CARE Score: 4  Discharge Goal: Independent    Physical Therapy:   Short term goals  Time Frame for Short term goals: 10 tx days:  Short term goal 1: Pt will complete bed mobility tasks (rolling L/R, scooting, and sup<->sit) Ind following posterior hip precautions. Short term goal 2: Pt will complete OOB transfers using RW Mod Ind. Short term goal 3: Pt will ambulate 150 ft using RW over level surface and 10 ft over carpeted/transitional surface with SBA. Short term goal 4: Goal progressed 03/04/2021: Pt will ascend/descend curb step using RW with Sup. following appropriate step sequencing. Short term goal 5: Pt will complete object retrieval from the floor with 1UE support on RW and using reacher Mod Ind. Short term goal 6: Pt will propel manual w/c >/=150 ft Mod Ind. Short term goal 7: Goal added 03/04/2021: Pt will ascend/descend 4-5 steps using railings with Sup following appropriate step sequencing. Bed Mobility:   Sit to Lying  Assistance Needed: Supervision or touching assistance  Comment: Sup.   CARE Score: 4  Discharge Goal: Independent  Roll Left and Right  Assistance Needed: Partial/moderate assistance(Rolling to R Min A; SBA to L with additional set-up assist of pillow between thighs to avoid adduction of R hip beyond midline)  CARE Score: 3  Discharge Goal: Independent  Lying to Sitting on Side of Bed  Assistance Needed: Supervision or touching assistance  Comment: Sup.   CARE Score: 4  Discharge Goal: Independent    Transfers:    Sit to Stand  Assistance Needed: Supervision or touching assistance  Comment: SBA using RW  CARE Score: 4  Discharge Goal: Independent  Chair/Bed-to-Chair Transfer  Assistance Needed: Supervision or touching assistance  Comment: SBA using RW  CARE Score: 4  Discharge Goal: Independent  Toilet Transfer  Assistance Needed: Partial/moderate assistance(Min A using grab bars)  CARE Score: 3  Car Transfer  Assistance Needed: Partial/moderate assistance(Min A using RW; pt was able to actively lift BLE in and out of car but required VCs on RLE positioning following posterior hip precautions)  CARE Score: 3  Discharge Goal: Independent    Ambulation:    Walking Ability  Does the Patient Walk?: Yes     Walk 10 Feet  Assistance Needed: Supervision or touching assistance  Comment: SBA using RW  CARE Score: 4  Discharge Goal: Supervision or touching assistance     Walk 50 Feet with Two Turns  Assistance Needed: Supervision or touching assistance  Comment: SBA using RW  Reason if not Attempted: Not attempted due to medical condition or safety concerns(pt was only able to tolerate 18 ft today (limited by fatigue and dizziness))  CARE Score: 4  Discharge Goal: Supervision or touching assistance     Walk 150 Feet  Assistance Needed: Supervision or touching assistance  Comment: SBA using RW; inconstistent with this performance due to dizziness; 197 ft yesterday, 137 ft at the most today  Reason if not Attempted: Not attempted due to medical condition or safety concerns  CARE Score: 4  Discharge Goal: Supervision or touching assistance     Walking 10 Feet on Uneven Surfaces  Assistance Needed: Supervision or touching assistance  Comment: SBA using RW  CARE Score: 4  Discharge Goal: Supervision or touching assistance     1 Step (Curb)  Assistance Needed: Supervision or touching assistance  Comment: SBA using RW  Reason if not Attempted: Not attempted due to medical condition or safety concerns  CARE Score: 4  Discharge Goal: Supervision or touching assistance     4 Steps  Assistance Needed: Supervision or touching assistance  Comment: SBA to ascend/descend 5 steps using railings  Reason if not Attempted: Not applicable  CARE Score: 4  Discharge Goal: Not Applicable     12 Steps  Reason if not Attempted: Not applicable  CARE Score: 9  Discharge Goal: Not Applicable       Wheelchair:  w/c Ability: Wheelchair Ability  Uses a Wheelchair and/or Scooter?: Yes  Wheel 50 Feet with Two Turns  Assistance Needed: Supervision or touching assistance(SBA-Sup)  CARE Score: 4  Discharge Goal: Independent  Wheel 150 Feet  Assistance Needed: Partial/moderate assistance(pt was only able to propel up to 75 ft with SBA-Sup; activity tolerance was limited by fatigue and worsening lethargy)  CARE Score: 3  Discharge Goal: Independent          Balance:        Object: Picking Up Object  Assistance Needed: Partial/moderate assistance(required Min A using RW and reacher)  CARE Score: 3  Discharge Goal: Independent    I      Exam:    Blood pressure 139/63, pulse 70, temperature 97.7 °F (36.5 °C), temperature source Oral, resp. rate 17, height 5' 2.99\" (1.6 m), weight 101 lb 1.6 oz (45.9 kg), SpO2 97 %. General: Up in a bedside chair with legs elevated. In good spirits. Oriented. HEENT: Clear speech. Neck supple. No JVD. Pulmonary: Unlabored breathing without coughing or rales. Cardiac: RRR. Abdomen: Patient's abdomen is soft and nondistended. Bowel sounds were present throughout. There was no rebound, guarding or masses noted.     Upper extremities: No new bruising or signs of consistent now. Weight is stable.        4. Obstipation: Working to stay ahead of her constipation with oral meds now.    Positive flatus. 5. Lumbar DDD with sciatica: Diathermy, therapeutic exercises and careful adjustments of gait training to accommodate her foot drop.  Her chronic leg pain is a little less troublesome during therapy recently. .  6. Nausea with emesis: Zofran is used infrequently now.  Regular bowel intervention continues.    Symptoms are not limiting her oral intake.

## 2021-03-08 NOTE — PROGRESS NOTES
Physical Therapy    [x] daily progress note       [] discharge       Patient Name:  Keven Olszewski   :  1923 MRN: [id number]  Room:  [address] [place] [address] Date of Admission: 2021  Rehabilitation Diagnosis:   Fracture of unspecified part of neck of right femur, initial encounter for closed fracture [S72.001A]  Closed displaced fracture of right femoral neck ([address] [S72.001A]       Date 3/8/2021       Day of ARU Week:  2   Time IN/OUT 4608-2119   Individual Tx Minutes 71   Group Tx Minutes    Co-Treat Minutes    Concurrent Tx Minutes    TOTAL Tx Time Mins 71   Variance Time    Variance Time []   Refusal due to:     []   Medical hold/reason:    []   Illness   []   Off Unit for test/procedure  []   Extra time needed to complete task  []   Therapeutic need  []   Other (specify):   Restrictions Restrictions/Precautions  Restrictions/Precautions: Fall Risk, Weight Bearing, ROM Restrictions  Position Activity Restriction  Hip Precautions: No hip flexion > 90 degrees, No hip internal rotation, No ADduction, Posterior hip precautions   Communication with other providers: [x]   OK to see per nursing:     []   Spoke with team member regarding:      Subjective observations and cognitive status: Pt sitting up in w/c and agreable to therapy. Pt has     good recall of 3 out of 3 hip precautions.     Sitting b/p 144/66 mmhg, HR=90 b/m and sp02= 100%    Pt reports at end of session that she has not eaten well since yesterday morning and is given an Ensure supplement to sip on per ok from nurse   Pain level/location:  0  /10       Location:    Discharge recommendations  Anticipated discharge date:  2021  Destination: []home alone   []home alone with assist PRN     [] home w/ family      [] Continuous supervision  []SNF    [] Assisted living     [] Other:   Continued therapy: []HHC PT  []OUTPATIENT  PT   [] No Further PT  Equipment needs: RW and manual w/c         Bed Mobility:           [x]   Pt received out of bed   Sit --> lying: sba    Transfers:    Sit--> Stand:  SBA  Stand --> Sit:   SBA  Toilet Transfer (if applicable): CG/SBA with c/o light headedness   Assistive device required for transfer:   FWW    Gait:    Distance:  330 ft, 90 ft  Assistance:  SBA/CGA d/t c/o dizziness during last gait with change of direction into room  Device:  FWW  Gait Quality:  Slow pace, recip steps with c/o stiffness    Stairs   # Completed:  15  Assistance:  SBA  Supportive Device:  B rail use    Uneven Surfaces:       Assistance:    SBA/CGA  Device:    FWW  Surfaces Completed:   []  Green mat with bean bags beneath      []  Throw rugs       []  Ramp       []  Outdoor pavements        []  Grass             []  Loose gravel        [x]  Other: carpet        Additional Therapeutic activities/exercises completed this date:     []   Nu-step:  Time:        Level:         #Steps:       []   Rebounder:    []  Seated     []  Standing        []   Balance training         []   Postural training    []   Supine ther ex (reps/sets):     [x]   Seated ther ex (reps/sets): laq x 10 x 2 sets, limited hip flex to neutral x 15 reps, pf/df x 15 reps    []   Standing ther ex (reps/sets):     []   Picking up object from floor (standing):                   []   Reacher used   []   Other:   [x]   Other:curb step with sba  Comments:      Patient/Caregiver Education and Training:   [x]   Bed Mobility/Transfer technique/safety  [x]   Gait technique/sequencing  [x]   Proper use of assistive device  []   Advanced mobility safety and technique  []   Reinforced patient's precautions/mobility while maintaining precautions  []   Postural awareness  []   Family training  []   Progress was updated and reviewed in Rehabtracker with patient and/or family this date. Treatment Plan for Next Session: Transfer training, gait training, stair training,     Assessment:   This pt demonstrated a negative response to today's treatment as evidenced by c/o increased light headedness/dizziness limiting pt ability and nurse notified. Pt left supine in bed with ensure. The patient is making slow progress toward established goals as evidenced by QI scores. Ongoing deficits are observed in the areas of gait and stair training and continued focus on advanced gait and mobility safetyis recommended. Treatment/Activity Tolerance:   [] Tolerated treatment with no adverse effects    [] Patient limited by fatigue  [] Patient limited by pain   [x] Patient limited by medical complications: dizziness   [] Adverse reaction to Tx:   [] Significant change in status    Safety:       [x]  bed alarm set    []  chair alarm set    []  Pt refused alarms                []  Telesitter activated      [x]  Gait belt used during tx session      []other:         Number of Minutes/Billable Intervention  Gait Training 30   Therapeutic Exercise    Neuro Re-Ed    Therapeutic Activity 41   Wheelchair Propulsion    Group    Other:    TOTAL 71         Social History  Social/Functional History  Lives With: Alone  Type of Home: (Pt reports living at [facility])  Home Layout: One level  Home Access: Stairs to enter without rails  Entrance Stairs - Number of Steps: threshold step at the door (garage entrance)  Bathroom Shower/Tub: Tub/Shower unit, Walk-in shower, Shower chair with back(soaked in tub at baseline)  Bathroom Toilet: Standard  Bathroom Equipment: Toilet raiser  Bathroom Accessibility: (2nd bathroom c walk-in shower is w/c/fww.  However pt reports use of tu/shower bathroom and is not w/c and walker accessible.)  Home Equipment: Cane  ADL Assistance: Independent  Homemaking Responsibilities: Yes  Meal Prep Responsibility: Primary  Laundry Responsibility: Primary  Cleaning Responsibility: Primary  Bill Paying/Finance Responsibility: Primary(niece Kevin Dougherty)  is POA)  Shopping Responsibility: Primary  Dependent Care Responsibility: No  Health Care Management: Primary  Ambulation Assistance: Independent  Transfer Assistance: Independent  Active : No  Patient's  Info: Pt uses [employer]  Mode of Transportation: Bus  Occupation: Retired  Leisure & Hobbies: Pt reports being very busy however enjoys painting: water color and oil painting. Pt also enjoys reading. Pt also reports enjoying walks however d/t balance concerns pt has stopped. Additional Comments: sleeps in regular,  flat bed; ~3 falls in the past year with recent fall requiring hospitalization due to injury    Objective                                                                                    Goals:  (Update in navigator)  Short term goals  Time Frame for Short term goals: 10 tx days:  Short term goal 1: Pt will complete bed mobility tasks (rolling L/R, scooting, and sup<->sit) Ind following posterior hip precautions. Short term goal 2: Pt will complete OOB transfers using RW Mod Ind. Short term goal 3: Pt will ambulate 150 ft using RW over level surface and 10 ft over carpeted/transitional surface with SBA. Short term goal 4: Goal progressed 03/04/2021: Pt will ascend/descend curb step using RW with Sup. following appropriate step sequencing. Short term goal 5: Pt will complete object retrieval from the floor with 1UE support on RW and using reacher Mod Ind. Short term goal 6: Pt will propel manual w/c >/=150 ft Mod Ind. Short term goal 7: Goal added 03/04/2021: Pt will ascend/descend 4-5 steps using railings with Sup following appropriate step sequencing.:   :        Plan of Care                                                                              Times per week: 5 days per week for a minimum of 60 minutes/day plus group as appropriate for 60 minutes.   Treatment to include Current Treatment Recommendations: Strengthening, Gait Training, Patient/Caregiver Education & Training, ROM, Equipment Evaluation, Education, & procurement, Balance Training, Pain Management, Functional Mobility Training, Endurance Training, Home Exercise Program, Positioning, Transfer Training, Safety Education & Training, Wheelchair Mobility Training    Electronically signed by   Juan Morris  3/8/2021, 8:20 AM

## 2021-03-09 PROCEDURE — 97150 GROUP THERAPEUTIC PROCEDURES: CPT

## 2021-03-09 PROCEDURE — 6370000000 HC RX 637 (ALT 250 FOR IP): Performed by: INTERNAL MEDICINE

## 2021-03-09 PROCEDURE — 97530 THERAPEUTIC ACTIVITIES: CPT

## 2021-03-09 PROCEDURE — 94761 N-INVAS EAR/PLS OXIMETRY MLT: CPT

## 2021-03-09 PROCEDURE — 97116 GAIT TRAINING THERAPY: CPT

## 2021-03-09 PROCEDURE — 6370000000 HC RX 637 (ALT 250 FOR IP): Performed by: PHYSICAL MEDICINE & REHABILITATION

## 2021-03-09 PROCEDURE — 99232 SBSQ HOSP IP/OBS MODERATE 35: CPT | Performed by: PHYSICAL MEDICINE & REHABILITATION

## 2021-03-09 PROCEDURE — 97110 THERAPEUTIC EXERCISES: CPT

## 2021-03-09 PROCEDURE — 97535 SELF CARE MNGMENT TRAINING: CPT

## 2021-03-09 PROCEDURE — 6360000002 HC RX W HCPCS: Performed by: INTERNAL MEDICINE

## 2021-03-09 PROCEDURE — 1280000000 HC REHAB R&B

## 2021-03-09 PROCEDURE — 94150 VITAL CAPACITY TEST: CPT

## 2021-03-09 RX ORDER — TRAMADOL HYDROCHLORIDE 50 MG/1
50 TABLET ORAL EVERY 6 HOURS PRN
Status: DISCONTINUED | OUTPATIENT
Start: 2021-03-09 | End: 2021-03-13 | Stop reason: HOSPADM

## 2021-03-09 RX ADMIN — GABAPENTIN 100 MG: 100 CAPSULE ORAL at 22:19

## 2021-03-09 RX ADMIN — TRAMADOL HYDROCHLORIDE 50 MG: 50 TABLET, FILM COATED ORAL at 18:32

## 2021-03-09 RX ADMIN — ENOXAPARIN SODIUM 30 MG: 30 INJECTION SUBCUTANEOUS at 08:00

## 2021-03-09 RX ADMIN — CLONIDINE HYDROCHLORIDE 0.1 MG: 0.1 TABLET ORAL at 22:19

## 2021-03-09 RX ADMIN — CLONIDINE HYDROCHLORIDE 0.1 MG: 0.1 TABLET ORAL at 08:00

## 2021-03-09 RX ADMIN — LOSARTAN POTASSIUM 50 MG: 50 TABLET, FILM COATED ORAL at 08:00

## 2021-03-09 RX ADMIN — SENNOSIDES 8.6 MG: 8.6 TABLET, FILM COATED ORAL at 22:19

## 2021-03-09 RX ADMIN — DOCUSATE SODIUM 100 MG: 100 CAPSULE, LIQUID FILLED ORAL at 08:00

## 2021-03-09 RX ADMIN — GABAPENTIN 100 MG: 100 CAPSULE ORAL at 08:00

## 2021-03-09 RX ADMIN — DILTIAZEM HYDROCHLORIDE 240 MG: 240 CAPSULE, COATED, EXTENDED RELEASE ORAL at 08:00

## 2021-03-09 RX ADMIN — ACETAMINOPHEN 650 MG: 325 TABLET ORAL at 06:02

## 2021-03-09 RX ADMIN — ASPIRIN 81 MG: 81 TABLET, COATED ORAL at 08:00

## 2021-03-09 ASSESSMENT — PAIN DESCRIPTION - LOCATION: LOCATION: VAGINA

## 2021-03-09 ASSESSMENT — PAIN SCALES - GENERAL: PAINLEVEL_OUTOF10: 6

## 2021-03-09 NOTE — PROGRESS NOTES
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 Right hip and left lower leg remain uncomfortable. Occasional bowel movement. Prior (baseline) level of function: Independent. Current level of function:         Current  [facility] and Goals:   Occupational Therapy:    Short term goals  Time Frame for Short term goals: STG=LTG :   Long term goals  Time Frame for Long term goals : 7-10 days or until d/c  Long term goal 1: Pt will complete feeding/grooming/oral care task c MOD I by d/c  Long term goal 2: Pt will complete total body bathing c MOD I by d/c  Long term goal 3: Pt will complete total body dressing (UB/LB/Footwear) c MOD I by d/c  Long term goal 4: Pt will complete toileting c MOD I by d/c  Long term goal 5: Pt will complete functional transfer (toilet, tub, shower) c MOD I by d/c. Long term goals 6: Pt will perform therex/therax to facilitate increased strength/endurance/ax tolerance (c emphasis on dynamic standing balance/tolerance >10 mins and BUE endurance) c MOD I in order to complete ADLs.  :                                       Eating: Eating  Assistance Needed: Independent  Comment: X  CARE Score: 6  Discharge Goal: Independent       Oral Hygiene: Oral Hygiene  Assistance Needed: Independent  Comment: X  CARE Score: 6  Discharge Goal: Independent    UB/LB Bathing: Shower/Bathe Self  Assistance Needed: Supervision or touching assistance  Comment: Sup during stance to wash buttocks and melony area unilateral support to grab bars  CARE Score: 4  Discharge Goal: Independent    UB Dressing: Upper Body Dressing  Assistance Needed: Independent  Comment: X  CARE Score: 6  Discharge Goal: Independent         LB Dressing: Lower Body Dressing  Assistance Needed: Supervision or touching assistance  Comment: Sup vc for placement and use of reacher to thread pants; cues to steady prior to managing pants fully over hips  CARE Score: 4  Discharge Goal: Independent    Donning and [place] Footwear: Putting On/Taking Off Footwear  Assistance Needed: Independent  Comment: using sock aide  CARE Score: 6  Discharge Goal: Independent      Toileting: Toileting Hygiene  Assistance Needed: Supervision or touching assistance  Comment: Sup vc for steadying self prior to managing pants fully over hips  CARE Score: 4  Discharge Goal: Independent      Toilet Transfers: Toilet Transfer  Assistance Needed: Supervision or touching assistance  Comment: Sup cues for hand placement  CARE Score: 4  Discharge Goal: Independent    Physical Therapy:   Short term goals  Time Frame for Short term goals: 10 tx days:  Short term goal 1: Pt will complete bed mobility tasks (rolling L/R, scooting, and sup<->sit) Ind following posterior hip precautions. Short term goal 2: Pt will complete OOB transfers using RW Mod Ind. Short term goal 3: Pt will ambulate 150 ft using RW over level surface and 10 ft over carpeted/transitional surface with SBA. Short term goal 4: Goal progressed 03/04/2021: Pt will ascend/descend curb step using RW with Sup. following appropriate step sequencing. Short term goal 5: Pt will complete object retrieval from the floor with 1UE support on RW and using reacher Mod Ind. Short term goal 6: Pt will propel manual w/c >/=150 ft Mod Ind. Short term goal 7: Goal added 03/04/2021: Pt will ascend/descend 4-5 steps using railings with Sup following appropriate step sequencing. Bed Mobility:   Sit to Lying  Assistance Needed: Supervision or touching assistance  Comment: Sup.   CARE Score: 4  Discharge touching assistance     Walking 10 Feet on Uneven Surfaces  Assistance Needed: Supervision or touching assistance  Comment: SBA using RW  CARE Score: 4  Discharge Goal: Supervision or touching assistance     1 Step (Curb)  Assistance Needed: Supervision or touching assistance  Comment: SBA using RW  Reason if not Attempted: Not attempted due to medical condition or safety concerns  CARE Score: 4  Discharge Goal: Supervision or touching assistance     4 Steps  Assistance Needed: Supervision or touching assistance  Comment: SBA to ascend/descend 5 steps using railings  Reason if not Attempted: Not applicable  CARE Score: 4  Discharge Goal: Not Applicable     12 Steps  Reason if not Attempted: Not applicable  CARE Score: 9  Discharge Goal: Not Applicable       Wheelchair:  w/c Ability: Wheelchair Ability  Uses a Wheelchair and/or Scooter?: Yes  Wheel 50 Feet with Two Turns  Assistance Needed: Supervision or touching assistance(SBA-Sup)  CARE Score: 4  Discharge Goal: Independent  Wheel 150 Feet  Assistance Needed: Partial/moderate assistance(pt was only able to propel up to 75 ft with SBA-Sup; activity tolerance was limited by fatigue and worsening lethargy)  CARE Score: 3  Discharge Goal: Independent          Balance:        Object: Picking Up Object  Assistance Needed: Partial/moderate assistance(required Min A using RW and reacher)  CARE Score: 3  Discharge Goal: Independent    I      Exam:    Blood pressure (!) 103/52, pulse 66, temperature 97.6 °F (36.4 °C), temperature source Oral, resp. rate 16, height 5' 2.99\" (1.6 m), weight 102 lb 3 oz (46.4 kg), SpO2 96 %. General: Semirecumbent in bed. Resting quietly. Alert. HEENT: MMM. Neck supple. Pulmonary: Symmetric air exchange without coughing. Cardiac: Regular rate and rhythm. Abdomen: Patient's abdomen is soft and nondistended. Bowel sounds were present throughout. There was no rebound, guarding or masses noted.     Upper extremities: Moving both upper limbs freely. Lower extremities: No signs of DVT. Incision clean and dry. Sitting balance was good. Standing balance was fair-.    Lab Results   Component Value Date    WBC 7.2 03/03/2021    HGB 9.8 (L) 03/03/2021    HCT 32.1 (L) 03/03/2021    MCV 97.0 03/03/2021     03/03/2021     Lab Results   Component Value Date    INR 0.91 02/22/2021    PROTIME 11.0 (L) 02/22/2021     Lab Results   Component Value Date    CREATININE 1.1 03/03/2021    BUN 26 (H) 03/03/2021     03/03/2021    K 4.7 03/03/2021     03/03/2021    CO2 31 03/03/2021     Lab Results   Component Value Date     (H) 02/23/2021     (H) 02/23/2021    ALKPHOS 135 (H) 02/23/2021    BILITOT 1.2 (H) 02/23/2021       Expected length of stay  prior to a supervised level of function for discharge home with a walker and [facility] OT/PT is 3/13/2021. Recommendations:    1. Right femoral neck fracture with hemiarthroplasty: Demonstrating benefit from participating in the daily occupational and physical therapy.  Benefiting from the pulmonary hygiene, DVT prophylaxis and cautious pain management.  Generally continent of bowel and bladder.     Starting to physically incorporate her hip precautions.  Adaptive equipment education.  Verbal cues and contact-guard-min physical assistance for transfers today. She will need to use a walker and wheelchair after discharge. 2. DVT prophylaxis: Lovenox 30 mg subcu daily.  I must monitor her hemoglobin and platelet count periodically while on this medication.  Weightbearing activities are slowly improving daily.  GI prophylaxis offered.  No clinical signs of blood loss.   3. Uncontrolled hypertension:  Accepting slight supine hypertension to avoid the orthostasis.  Continue with Cardizem, Catapres and Cozaar.  Target systolic blood pressure is 120-140.  Vital signs are checked at rest and with activity.  Blood pressure dropped a little today but no new symptoms reported. Elmer Gregory    4. Obstipation: Working to stay ahead of her constipation with oral meds now.    Positive flatus. 5. Lumbar DDD with sciatica: Diathermy, therapeutic exercises and careful adjustments of gait training to accommodate her foot drop.  Her chronic leg pain is a little less troublesome during therapy recently. .  6. Nausea with emesis: Zofran is used infrequently now.  Regular bowel intervention continues.    Symptoms are not limiting her oral intake.

## 2021-03-09 NOTE — PROGRESS NOTES
Physical Rehabilitation: OCCUPATIONAL THERAPY     [x] daily progress note       [] discharge       Patient Name:  Keven Olszewski   :  1923 MRN: [id number]  Room:  [address] [place] [address] Date of Admission: 2021  Rehabilitation Diagnosis:   Fracture of unspecified part of neck of right femur, initial encounter for closed fracture [S72.001A]  Closed displaced fracture of right femoral neck ([address] [S72.001A]       Date 3/9/2021       Day of ARU Week:  3   Time IN/OUT 1330/1430   Individual Tx Minutes 60   Group Tx Minutes    Co-Treat Minutes    Concurrent Tx Minutes    TOTAL Tx Time Mins 60   Variance Time    Variance Time []   Refusal due to:     []   Medical hold/reason:    []   Illness   []   Off Unit for test/procedure  []   Extra time needed to complete task  []   Therapeutic need  []   Other (specify):   Restrictions Restrictions/Precautions: Fall Risk, Weight Bearing, ROM Restrictions     Hip Precautions: No hip flexion > 90 degrees, No hip internal rotation, No ADduction, Posterior hip precautions   Communication with other providers: [x]   OK to see per nursing:     []   Spoke with team member regarding:      Subjective observations and cognitive status: Patient supine in bed sleeping upon entry, pleasant and agreeable to therapy session this afternoon      Pain level/location:    /10       Location: no pain noted per patient    Discharge recommendations  Anticipated discharge date:  3/13  Destination: [x]home alone   []home alone w assist prn   [] home w/ family    [] Continuous supervision       []SNF    [] Assisted living     [] Other:   Continued therapy: [x]HHC OT  []OUTPATIENT  OT   [] No Further OT  Equipment needs: None      Toileting:    Mod I       Toilet Transfers:   Sup  Device Used:    []   Standard Toilet         [x]   Grab Bars           []  Bedside Commode       [x]   Elevated Toilet          []   Other:        Bed Mobility:           []   Pt received out of bed   Rolling R/L:  Ind  Scooting: Ind  Supine --> Sit:  Ind  Sit --> Supine:  Ind    Transfers:    Sit--> Stand:  Sup  Stand --> Sit:   Mod I   Stand-Pivot:   Sup  Other:    Assistive device required for transfer:   RW        Functional Mobility:  Throughout hallways of ARU   Assistance: Mod I   Device:   [x]   Rolling Walker     []   Standard Walker []   Wheelchair        []   Eli Kevin       []   4-Wheeled Noralyn Pablo         []   Cardiac Walker       []   Other:        [employer] Tasks:   Retrieves sweater from luggage and maximino, retrieves tennis shoes, able to maximino L shoe and sock with mod I, able to maximino R shoe and sock with sock aide and Long handle shoe horn with Sup     Additional Therapeutic activities/exercises completed this date:     [x]   ADL Training   [x]   Balance/Postural training     [x]   Bed/Transfer Training   [x]   Endurance Training   []   Neuromuscular Re-ed   []   Nu-step:  Time:        Level:         #Steps:       []   Rebounder:    []  Seated     []  Standing        []   Supine Ther Ex (reps/sets):     [x]   Seated Ther Ex (reps/sets): patient instructed in B UE exercises with 2# dowel and orange theraband all planes and all joints to increase strength and endurance for facilitation of ADL and mobility tasks      []   Standing Ther Ex (reps/sets):     []   Other:      Comments:      Patient/Caregiver Education and Training:   [x]   Adaptive Equipment Use patient instructed on using both reacher and long handle sponge and sock aide to maximino shoes and socks this date to facilitate THP   []   Bed Mobility/Transfer Technique/Safety  []   Energy Conservation Tips  []   Family training  []   Postural Awareness  []   Safety During Functional Activities  [x]   Reinforced Patient's Precautions Patient able to demo and recall THP with 100% accuracy multiple times throughout treatment session   []   Progress was updated and reviewed in Rehabtracker with patient and/or family this         date.     Treatment Plan for Next Session: POC to continue as tolerated     Assessment: This pt demonstrated a positive  response to today's treatment as evidenced by good safety awareness with THP . The patient is making Excellent  progress toward established goals as evidenced by QI scores. Treatment/Activity Tolerance:   [x] Tolerated treatment with no adverse effects    [] Patient limited by fatigue  [] Patient limited by pain   [] Patient limited by medical complications:    [] Adverse reaction to Tx:   [] Significant change in status    Safety:       [x]  bed alarm set    []  chair alarm set    []  Pt refused alarms                []  Telesitter activated      [x]  Gait belt used during tx session      []other:       Number of Minutes/Billable Intervention  Therapeutic Exercise 15   ADL Self-care 15   Neuro Re-Ed    Therapeutic Activity 30   Group    Other:    TOTAL 60       Social History  Social/Functional History  Lives With: Alone  Type of Home: (Pt reports living at [facility])  Home Layout: One level  Home Access: Stairs to enter without rails  Entrance Stairs - Number of Steps: threshold step at the door (garage entrance)  Bathroom Shower/Tub: Tub/Shower unit, Walk-in shower, Shower chair with back(soaked in tub at baseline)  Bathroom Toilet: Standard  Bathroom Equipment: Toilet raiser  Bathroom Accessibility: (2nd bathroom c walk-in shower is w/c/fww.  However pt reports use of tu/shower bathroom and is not w/c and walker accessible.)  Home Equipment: Cane  ADL Assistance: Independent  Homemaking Responsibilities: Yes  Meal Prep Responsibility: Primary  Laundry Responsibility: Primary  Cleaning Responsibility: Primary  Bill Paying/Finance Responsibility: Primary(tundemichela White)  is POA)  Shopping Responsibility: Primary  Dependent Care Responsibility: No  Health Care Management: Primary  Ambulation Assistance: Independent  Transfer Assistance: Independent  Active : No  Patient's  Info: Pt uses [employer] [facility] [employer]

## 2021-03-09 NOTE — FLOWSHEET NOTE
[x] daily progress note       [] discharge       Patient Name:  Lauro Whitlock   :  1923 MRN: [id number]  Room:  [address] [place], [place] [address] Date of Admission: 2021  Rehabilitation Diagnosis:   Fracture of unspecified part of neck of right femur, initial encounter for closed fracture [S72.001A]  Closed displaced fracture of right femoral neck ([address] [S72.001A]       Date 3/9/2021       Day of ARU Week:  3   Time IN/-950  [id number]   Individual Tx Minutes 60   Group Tx Minutes 60   TOTAL Tx Time Mins 120   Restrictions Restrictions/Precautions  Restrictions/Precautions: Fall Risk, Weight Bearing, ROM Restrictions  Position Activity Restriction  Hip Precautions: No hip flexion > 90 degrees, No hip internal rotation, No ADduction, Posterior hip precautions   Communication with other providers: [x]   OK to see per nursing:     []   Spoke with team member regarding:      Subjective observations and cognitive status: AM session: pt seen in semi-link's position in bed at beginning of treatment. Agreeable to therapy. Group session: pt seen up in w/c at beginning of treatment. Agreeable to therapy. Pain level/location:    0/10       Discharge recommendations  Anticipated discharge date:  2021  Destination: []??home alone   []? ?home alone with assist PRN     []? ? home w/ family      []? ? Continuous supervision  []? ?SNF    []? ? Assisted living     []? ? Other:   Continued therapy: []??C PT  []??OUTPATIENT  PT   []? ? No Further PT  Equipment needs: RW and manual w/c     Bed Mobility:            Rolling R/L:  Supervision (Vcs for proper technique) (setup for pillow placement between LEs to abide by hip precautions)   Scooting:  Independent   Lying --> Sit:  Independent   Sit --> lying:  Independent   Practiced in regular bed     Transfers:    Sit--> Stand:  Supervision   Stand --> Sit:   Supervision   Chair-->Bed/Bed --> Chair:   Supervision   Car Transfers:  Supervision   Assistive device required for transfer: RW  vcs for proper hand placement. Gait:    Distance:  350'+230'    Assistance: Mod I   Device:  RW  Gait Quality:  Reciprocal pattern     Stairs   # Completed:15  Assistance:  Supervision   Supportive Device:  2 rails  vcs for proper sequencing     Uneven Surfaces:       Assistance: Mod I   Device:    RW  Surfaces Completed:   [x]  Carpeted Surface     []  Throw rugs       []  Ramp       []  Outdoor pavements        []  Grass             []  Loose gravel        []  Other:     Pt amb over transitional surface mod I with RW. Pt amb up/down ramp supervision with RW. Pt amb up/down 6\" curb step supervision with RW. (Vcs for proper sequencing). Pt sidestepped through narrow spaces mod I with RW. Additional Therapeutic activities/exercises completed this date:     []   Nu-step:  Time:        Level:         #Steps:       []   Rebounder:    []  Seated     []  Standing        []   Balance training         []   Postural training    [x]   Supine ther ex (reps/sets): ankle pumps, quad sets, buttock squeezes, heel slides, hip abduction x 20 reps each for strengthening. []   Seated ther ex (reps/sets):     []   Standing ther ex (reps/sets):     []   Picking up object from floor (standing):                   []   Reacher used   [x]   Other: pt able to recall 3/3 hip precautions, but did require vcs throughout session to abide by hip precautions. Pt was educated fully in all 3 hip precautions with many different scenarios provided as examples. []   Other:    Group:   Total time in group = 60 min    Exercise Group: Todd Cabello participated in exercise and discussion on benefits and precautions during exercise. This provided the opportunity to have fun, build camaraderie, increase endurance, improve breathing techniques, balance, and strength. Todd Cabello performed a variety of seated activities as able, with focus on technique and safety during exercise.  Also focused on warm-up, warm-down, endurance monitoring, strengthening & strategies, function, safety, and general social & communication opportunities with other group members. Functional areas targeted:   [] Communication [x] Memory  [x] Coordination    [] Kitchen Safety [x] Problem Solving  [x] Strengthening     [x] Attention  [x] Endurance   [] Mobility    [x] Awareness/Insight [] Balance  [] Transfers  [x] Safety   [] Other (specify)  Participation:  [x] Actively participated        Education completed: benefits of exercise; signs and symptoms of exercise intolerance. Cognitive fx during this group: pt did well with following instructions and paying attention during group. Family present: no         Patient/Caregiver Education and Training:   [x]   Bed Mobility/Transfer technique/safety  [x]   Gait technique/sequencing  [x]   Proper use of assistive device  [x]   Advanced mobility safety and technique  [x]   Reinforced patient's precautions/mobility while maintaining precautions  []   Postural awareness  []   Family training  [x]   Progress was updated in Rehabtracker this date. Treatment Plan for Next Session: exercise; gait; transfers; advanced gait; stair training      Assessment: This pt demonstrated a positive response to today's treatment as evidenced by improved endurance and no complaints of dizziness. The patient is making improved progress toward established goals as evidenced by QI scores. Ongoing deficits are observed in the areas of safety and continued focus on safety is recommended.      Treatment/Activity Tolerance:   [x] Tolerated treatment with no adverse effects    [] Patient limited by fatigue  [] Patient limited by pain   [] Patient limited by medical complications:    [] Adverse reaction to Tx:   [] Significant change in status    Safety:       [x]  bed alarm set    []  chair alarm set    []  Pt refused alarms                []  Telesitter activated      [x]  Gait belt used during tx session      [x]other: pt left in semi-link's position in bed with call light at end of treatment. 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 However pt reports use of tu/shower bathroom and is not w/c and walker accessible.)  Home Equipment: Cane  ADL Assistance: Independent  Homemaking Responsibilities: Yes  Meal Prep Responsibility: Primary  Laundry Responsibility: Primary  Cleaning Responsibility: Primary  Bill Paying/Finance Responsibility: Primary(paige Vargas)  is POA)  Shopping Responsibility: Primary  Dependent Care Responsibility: No  Health Care Management: Primary  Ambulation Assistance: Independent  Transfer Assistance: Independent  Active : No  Patient's  Info: Pt uses [facility]  Mode of Transportation: Bus  Occupation: Retired  Leisure & Hobbies: Pt reports being very busy however enjoys painting: water color and oil painting. Pt also enjoys reading. Pt also reports enjoying walks however d/t balance concerns pt has stopped.   Additional Comments: sleeps in regular,  flat bed; ~3 falls in the past year with recent fall requiring hospitalization due to injury    Objective                                                                                    Goals:  (Update in navigator)  Short term goals  Time Frame for Short term goals: 10 tx days:  Short term goal 1: Pt will complete bed mobility tasks (rolling L/R, scooting, and sup<->sit) Ind following posterior hip precautions. Short term goal 2: Pt will complete OOB transfers using RW Mod Ind. Short term goal 3: Pt will ambulate 150 ft using RW over level surface and 10 ft over carpeted/transitional surface with SBA. Short term goal 4: Goal progressed 03/04/2021: Pt will ascend/descend curb step using RW with Sup. following appropriate step sequencing. Short term goal 5: Pt will complete object retrieval from the floor with 1UE support on RW and using reacher Mod Ind. Short term goal 6: Pt will propel manual w/c >/=150 ft Mod Ind. Short term goal 7: Goal added 03/04/2021: Pt will ascend/descend 4-5 steps using railings with Sup following appropriate step sequencing.:   :        Plan of Care                                                                              Times per week: 5 days per week for a minimum of 60 minutes/day plus group as appropriate for 60 minutes.   Treatment to include Current Treatment Recommendations: Strengthening, Gait Training, Patient/Caregiver Education & Training, ROM, Equipment Evaluation, Education, & procurement, Balance Training, Pain Management, Functional Mobility Training, Endurance Training, Home Exercise Program, Positioning, Transfer Training, Safety Education & Training, Wheelchair Mobility Training    Electronically signed by   Luke Rush, [id number]  3/9/2021, 9:40 AM

## 2021-03-10 PROCEDURE — 97116 GAIT TRAINING THERAPY: CPT

## 2021-03-10 PROCEDURE — 1280000000 HC REHAB R&B

## 2021-03-10 PROCEDURE — 94150 VITAL CAPACITY TEST: CPT

## 2021-03-10 PROCEDURE — 99232 SBSQ HOSP IP/OBS MODERATE 35: CPT | Performed by: PHYSICAL MEDICINE & REHABILITATION

## 2021-03-10 PROCEDURE — 6370000000 HC RX 637 (ALT 250 FOR IP): Performed by: PHYSICAL MEDICINE & REHABILITATION

## 2021-03-10 PROCEDURE — 97530 THERAPEUTIC ACTIVITIES: CPT

## 2021-03-10 PROCEDURE — 97110 THERAPEUTIC EXERCISES: CPT

## 2021-03-10 PROCEDURE — 97535 SELF CARE MNGMENT TRAINING: CPT

## 2021-03-10 PROCEDURE — 94761 N-INVAS EAR/PLS OXIMETRY MLT: CPT

## 2021-03-10 PROCEDURE — 6360000002 HC RX W HCPCS: Performed by: INTERNAL MEDICINE

## 2021-03-10 PROCEDURE — 6370000000 HC RX 637 (ALT 250 FOR IP): Performed by: INTERNAL MEDICINE

## 2021-03-10 RX ADMIN — CLONIDINE HYDROCHLORIDE 0.1 MG: 0.1 TABLET ORAL at 08:01

## 2021-03-10 RX ADMIN — DOCUSATE SODIUM 100 MG: 100 CAPSULE, LIQUID FILLED ORAL at 08:01

## 2021-03-10 RX ADMIN — ASPIRIN 81 MG: 81 TABLET, COATED ORAL at 08:01

## 2021-03-10 RX ADMIN — TRAMADOL HYDROCHLORIDE 50 MG: 50 TABLET, FILM COATED ORAL at 00:40

## 2021-03-10 RX ADMIN — ENOXAPARIN SODIUM 30 MG: 30 INJECTION SUBCUTANEOUS at 08:01

## 2021-03-10 RX ADMIN — SENNOSIDES 8.6 MG: 8.6 TABLET, FILM COATED ORAL at 20:04

## 2021-03-10 RX ADMIN — CLONIDINE HYDROCHLORIDE 0.1 MG: 0.1 TABLET ORAL at 20:04

## 2021-03-10 RX ADMIN — GABAPENTIN 100 MG: 100 CAPSULE ORAL at 08:02

## 2021-03-10 RX ADMIN — TRAMADOL HYDROCHLORIDE 50 MG: 50 TABLET, FILM COATED ORAL at 06:10

## 2021-03-10 RX ADMIN — DILTIAZEM HYDROCHLORIDE 240 MG: 240 CAPSULE, COATED, EXTENDED RELEASE ORAL at 08:01

## 2021-03-10 RX ADMIN — GABAPENTIN 100 MG: 100 CAPSULE ORAL at 20:04

## 2021-03-10 RX ADMIN — LOSARTAN POTASSIUM 50 MG: 50 TABLET, FILM COATED ORAL at 08:01

## 2021-03-10 ASSESSMENT — PAIN DESCRIPTION - PAIN TYPE: TYPE: CHRONIC PAIN

## 2021-03-10 ASSESSMENT — PAIN DESCRIPTION - DESCRIPTORS: DESCRIPTORS: THROBBING

## 2021-03-10 ASSESSMENT — PAIN SCALES - GENERAL
PAINLEVEL_OUTOF10: 0
PAINLEVEL_OUTOF10: 4

## 2021-03-10 NOTE — PATIENT CARE CONFERENCE
ACUTE REHAB TEAM CONFERENCE SUMMARY   [facility]    NAME: Celso   : 1923 ADMIT DATE: 2021    Rehab Admitting Dx: Fracture of unspecified part of neck of right femur, initial encounter for closed fracture [S72.001A]  Closed displaced fracture of right femoral neck ([address] [S72.001A]  Patient Comorbid Conditions: Active Hospital Problems    Diagnosis Date Noted    DDD (degenerative disc disease), lumbar [M51.36]     Sciatica, left side [M54.32]     Closed displaced fracture of right femoral neck ([address] [S72.001A] 2021    Uncontrolled hypertension [I10]      Date: 3/11/2021    CASE MANAGEMENT  Current issues/needs regarding patient and family discharge status:   Patient plans d/c [facility] [place]. Supportive local niece. Patient expressed some concern re: d/c home alone. Patient mod I - supervision    PHYSICAL THERAPY (Updated in QI)  Short term goals  Time Frame for Short term goals: 10 tx days:  Short term goal 1: Pt will complete bed mobility tasks (rolling L/R, scooting, and sup<->sit) Ind following posterior hip precautions. Short term goal 2: Pt will complete OOB transfers using RW Mod Ind. Short term goal 3: Pt will ambulate 150 ft using RW over level surface and 10 ft over carpeted/transitional surface with SBA. Short term goal 4: Goal progressed 2021: Pt will ascend/descend curb step using RW with Sup. following appropriate step sequencing. Short term goal 5: Pt will complete object retrieval from the floor with 1UE support on RW and using reacher Mod Ind. Short term goal 6: Pt will propel manual w/c >/=150 ft Mod Ind. Short term goal 7: Goal added 2021: Pt will ascend/descend 4-5 steps using railings with Sup following appropriate step sequencing. Impairments/deficits, barriers:     Body structures, Functions, Activity limitations: Decreased functional mobility , Decreased high-level IADLs, Decreased ADL status, Decreased endurance, Decreased ROM, Decreased strength, Decreased balance, Vestibular Impairment, Increased pain, Decreased vision/visual deficit     Prognosis: Good  Decision Making: High Complexity  Clinical Presentation: unstable/unpredictable characteristics  Equipment needed at discharge: RW      PT IRF-MUSTAPHA scores since initial assessment  Bed Mobility:   Sit to Lying  Assistance Needed: Independent  Comment: in regular bed  CARE Score: 6  Discharge Goal: Independent    Roll Left and Right  Assistance Needed: Independent  Comment: in regular bed with pillow between legs for hip precautions. CARE Score: 6  Discharge Goal: Independent    Lying to Sitting on Side of Bed  Assistance Needed: Independent  Comment: in regular bed  CARE Score: 6  Discharge Goal: Independent    Transfers:    Sit to Stand  Assistance Needed: Supervision or touching assistance  Comment: Supervision; vcs for proper hand placement. CARE Score: 4  Discharge Goal: Independent    Chair/Bed-to-Chair Transfer  Assistance Needed: Supervision or touching assistance  Comment: Supervision; vcs for proper hand placement.   CARE Score: 4  Discharge Goal: Independent    Toilet Transfer  Assistance Needed: Partial/moderate assistance(Min A using grab bars)  CARE Score: 3    Car Transfer  Assistance Needed: Independent  Comment: x  CARE Score: 6  Discharge Goal: Independent    Ambulation:    Walking Ability  Does the Patient Walk?: Yes     Walk 10 Feet  Assistance Needed: Independent  Comment: mod I with RW  CARE Score: 6  Discharge Goal: Supervision or touching assistance     Walk 50 Feet with Two Turns  Assistance Needed: Independent  Comment: mod I with RW  Reason if not Attempted: Not attempted due to medical condition or safety concerns(pt was only able to tolerate 18 ft today (limited by fatigue and dizziness))  CARE Score: 6  Discharge Goal: Supervision or touching assistance     Walk 150 Feet  Assistance Needed: Independent  Comment: mod I with RW  Reason if not Attempted: Not attempted due to medical condition or safety concerns  CARE Score: 6  Discharge Goal: Supervision or touching assistance     Walking 10 Feet on Uneven Surfaces  Assistance Needed: Independent  Comment: mod I with RW  CARE Score: 6  Discharge Goal: Supervision or touching assistance     1 Step (Curb)  Assistance Needed: Supervision or touching assistance  Comment: supervision with RW. Vcs for walker safety. Reason if not Attempted: Not attempted due to medical condition or safety concerns  CARE Score: 4  Discharge Goal: Supervision or touching assistance     4 Steps  Assistance Needed: Independent  Comment:  Mod I with 2 rails  Reason if not Attempted: Not applicable  CARE Score: 6  Discharge Goal: Not Applicable     12 Steps  Assistance Needed: Independent  Comment: mod I with 2 rails  Reason if not Attempted: Not applicable  CARE Score: 6  Discharge Goal: Not Applicable    Gait Deviations: []None []Slow mj  [] Increased DIONNE  [] Staggers []Deviated Path  [] Decreased step length  [] Decreased step height  []Decreased arm swing  [] Shuffles  [] Decreased head and trunk rotation  []other:        Wheelchair:  w/c Ability: Wheelchair Ability  Uses a Wheelchair and/or Scooter?: No(pt ambulating with RW at this time.)    Wheel 50 Feet with Two Turns  Assistance Needed: Supervision or touching assistance(SBA-Sup)  CARE Score: 4  Discharge Goal: Independent    Wheel 150 Feet  Assistance Needed: Partial/moderate assistance(pt was only able to propel up to 75 ft with SBA-Sup; activity tolerance was limited by fatigue and worsening lethargy)  CARE Score: 3  Discharge Goal: Independent              Balance:        Object: Picking Up Object  Assistance Needed: Independent  Comment: mod I with reacher  CARE Score: 6  Discharge Goal: Independent    Fall Risk: []  Yes  []  No    OCCUPATIONAL THERAPY  (Updated in QI)  Short term goals  Time Frame for Short term goals: STG=LTG :   Long term goals  Time Frame for Long term goals : 7-10 days or until d/c  Long term goal 1: Pt will complete feeding/grooming/oral care task c MOD I by d/c  Long term goal 2: Pt will complete total body bathing c MOD I by d/c  Long term goal 3: Pt will complete total body dressing (UB/LB/Footwear) c MOD I by d/c  Long term goal 4: Pt will complete toileting c MOD I by d/c  Long term goal 5: Pt will complete functional transfer (toilet, tub, shower) c MOD I by d/c. Long term goals 6: Pt will perform therex/therax to facilitate increased strength/endurance/ax tolerance (c emphasis on dynamic standing balance/tolerance >10 mins and BUE endurance) c MOD I in order to complete ADLs. :                                       OT IRF-MUSTAPHA scores and goals since initial assessment:    ADLs:    Eating: Eating  Assistance Needed: Independent  Comment: X  CARE Score: 6  Discharge Goal: Independent       Oral Hygiene: Oral Hygiene  Assistance Needed: Independent  Comment: X  CARE Score: 6  Discharge Goal: Independent    UB/LB Bathing: Shower/Bathe Self  Assistance Needed: Supervision or touching assistance  Comment: Sup for shower transfer and vc to use grab bars for assit when in stance abd vc for use of LHS for distal B LE  CARE Score: 4  Discharge Goal: Independent    UB Dressing: Upper Body Dressing  Assistance Needed: Independent  Comment: X  CARE Score: 6  Discharge Goal: Independent         LB Dressing: Lower Body Dressing  Assistance Needed: Independent  Comment: MOd I using reacher to thread feet through pants whle following THP  CARE Score: 6  Discharge Goal: Independent    Donning and Crowell Footwear: Putting On/Taking Off Footwear  Assistance Needed: Supervision or touching assistance  Comment: Mod I using sock aide while following THP/ SUp for using long handle shor horn for tennis shoes  CARE Score: 4  Discharge Goal: Independent      Toileting:  Toileting Hygiene  Assistance Needed: Independent  Comment: X  CARE Score: 6  Discharge Goal: Independent      Toilet Transfers: Toilet Transfer  Assistance Needed: Supervision or touching assistance  Comment: Sup one cue for hand placement c standing  CARE Score: 4  Discharge Goal: Independent      Impairments/deficits, barriers:  Intermittent c/o dizziness, light headedness  Assessment  Performance deficits / Impairments: Decreased functional mobility , Decreased balance, Decreased coordination, Decreased vision/visual deficit, Decreased posture, Decreased ADL status, Decreased high-level IADLs, Decreased ROM, Decreased fine motor control, Decreased strength  Decision Making: High Complexity  REQUIRES OT FOLLOW UP: Yes  Equipment needed at discharge:none      COGNITIVE FUNCTION/SPEECH THERAPY (AS INDICATED)      Nursing Current Medical Status:   [x] Is continent of bowel and bladder     [] Is incontinent of bowel and bladder    [x] Has had an adequate number of bowel movements   [x] Urinates with no urinary retention >300ml in bladder   [] Targeting bladder protocol with mcdonough removal   [x] Maintaining O2 SATs at 92% or greater   [x] Has pain managed while on ARU         [x] Has had no skin breakdown while on ARU   [] Has improved skin integrity via wound measurements   [] Has no signs/symptoms of infection at the wound site   [] Pressure wounds Stage/Location:    [] Arrived on unit with pressure wound  [x] Has been free from injury during hospitalization   [] Has experienced a fall during hospitalization  [x] Ongoing education with patient/family with understanding demonstrated for:  [] Receives IV Fluids  [] Other:        NUTRITION  Weight: 100 lb 8 oz (45.6 kg) / Body mass index is 17.81 kg/m².   Current diet: DIET GENERAL;  Dietary Nutrition Supplements: Frozen Oral Supplement  Dietary Nutrition Supplements: Standard High Calorie Oral Supplement  Intake: Usual intake at meals 50-75%, agreeable to some oral nutrition supplements      Medical improvements/barriers: Pt reports feeling depressed, progressing well, on track for discharge        Team goals for next treatment period/Intervention for current barriers:   [x] Pt will increase activity tolerance for daily tasks. [] Pt will improve bed mobility with reduced assist.  [x] Pt will improve safety in fx tasks with reduced cues/assist  [x] Pt will improve transfers with reduced assist  [] Pt will improve toileting with reduced assist  [x] Pt will improve ADL's with use of adaptive equipment with reduced assist  [] Pt will improve pain mgmt for maximum participation in tx program  [] Pt will improve communication to get basic needs met on unit  [] Pt will improve swallowing for safe diet advancement with use of strategies  []  Plan for discharge to home. Patient Strengths: Motivated, Cooperative and Pleasant    Justification for Continued Stay  Based on my medical assessment of the patient and review of information from the interdisciplinary team as part of this weekly team conference, the patient continues to meet the following criteria for IRF level of care:   The patient requires active and ongoing intervention of multiple therapy disciplines   The patient requires and intensive rehabilitation therapy program   The patient requires continued physician supervision by a rehabilitation physician   The patient requires 24 hours rehab nursing care   The patient requires an intensive and coordinated interdisciplinary team approach to the delivery of rehabilitative care.      Assessment/Plan   [x]  The patient is making good progression towards their long term goals and is actively participating in and has a reasonable expectation to continue to benefit from the intensive rehabilitation therapy program   []  The estimated discharge date has been changed from initial team conference due to:   []  The estimated discharge destination has been changed from initial team conference due to:         Ongoing tx following discharge: [x]C OT, PT []OUTPATIENT     [] No Further Treatment     [] Family/Caregiver Training  []  Transitional Living Arrangement   [] Home Assessment (date  )     [] Family Conference   []  Therapeutic Pass       []  Other: (specify)    Estimated Discharge Date: 3/13/21    Estimated Discharge Destination: []home alone   [x]home alone with assist prn  []Continuous supervision []Return home with spouse/family   [] Assisted living  []SNF     Team members participating in today's conference. [x] Alisson Freeman, Medical Director  [x] Mike Oliveira,   [x] Brennon Veras, Nurse Mgr     [x]  Amanda Prescott, PT   [x] Mayra Martinez, OT   []  Ward Shaffer, PT  [] Graham Perez, OT      [x] Chris Riley, SLP     []  Al Calloway RD     [x] Osvaldo Kang,     [x]Abril Suarez,    [] Jose Montilla RN    [x] Rancho Mcknight, SALOME  [] Raza Coronel RN    [] Rob Malone, SALOME    [] William Leigh,  RN      [] Renuka Kelley RN    I have led this Team Conference and agree with the plan, Fabiola Finley MD, 3/11/2021, 12:42 PM  Goals have been updated to reflect recent status.     Team conference note transcribed this date by: [place], [id number] [facility], Therapy Coordinator

## 2021-03-10 NOTE — PROGRESS NOTES
or touching assistance  Comment: Sup in stance for melony / buttocks wash, seated for remainder c use of AE, occasional cue for posterior hip precautions  CARE Score: 4  Discharge Goal: Independent    UB Dressing: Upper Body Dressing  Assistance Needed: Setup or clean-up assistance  Comment: Sup for doffing / donning in stance  CARE Score: 5  Discharge Goal: Independent         LB Dressing: Lower Body Dressing  Assistance Needed: Supervision or touching assistance  Comment: Sup + min cues for AE to doff / don underwear and pants, perform clothing hikes  CARE Score: 4  Discharge Goal: Independent    Donning and [place]is Footwear: Putting On/Taking Off Footwear  Assistance Needed: Supervision or touching assistance  Comment: Sup + sock aid, min cues for technique  CARE Score: 4  Discharge Goal: Independent      Toileting: Toileting Hygiene  Assistance Needed: Supervision or touching assistance  Comment: Sup for clothing mgmt and hygiene  CARE Score: 4  Discharge Goal: Independent      Toilet Transfers:    Toilet Transfer  Assistance Needed: Supervision or touching assistance  Comment: Sup c RW + one cue for safe body positioning  CARE Score: 4  Discharge Goal: Independent  Device Used:    []   Standard Toilet         []   Grab Bars           []  Bedside Commode       []   Elevated Toilet          []   Other:        Bed Mobility:           []   Pt received out of bed   Rolling R/L:  Not addressed  Scooting:  Sup  Supine --> Sit:  Sup  Sit --> Supine:  Not addressed    Transfers:    Sit--> Stand:  Sup  Stand --> Sit:   Sup  Stand-Pivot:   Sup c RW  Other:  Required occasional cue for safe body positioning  Assistive device required for transfer:   RW      Functional Mobility:    Assistance:  Sup c RW to / from bathroom  Device:   [x]   Rolling Walker     []   Standard Walker []   Wheelchair        []   [place]       []   4-Wheeled [place]         []   Cardiac Walker       []   Other:        Additional Therapeutic activities/exercises completed this date:     [x]   ADL Training   [x]   Balance/Postural training     [x]   Bed/Transfer Training   [x]   Endurance Training   []   Neuromuscular Re-ed   []   Nu-step:  Time:        Level:         #Steps:       []   Rebounder:    []  Seated     []  Standing        []   Supine Ther Ex (reps/sets):     []   Seated Ther Ex (reps/sets):     []   Standing Ther Ex (reps/sets):     []   Other:      Comments: All therapeutic intervention performed c emphasis on compliance to posterior hip precautions during ADL tasks, dynamic balance / standing tolerance to inc strength, endurance and act tolerance for inc Indep c ADL tasks, func transfers / mobility. Patient/Caregiver Education and Training:   [x]   Adaptive Equipment Use - LH sponge, reacher, sock aid  [x]   Bed Mobility/Transfer Technique/Safety  [x]   Energy Conservation Tips  []   Family training  []   Postural Awareness  [x]   Safety During Functional Activities  [x]   Reinforced Patient's Precautions   []   Progress was updated and reviewed in Rehabtracker with patient and/or family this         date.     Treatment Plan for Next Session: Continue per OT POC per patient's tolerance      Assessment:        Treatment/Activity Tolerance:   [x] Tolerated treatment with no adverse effects    [] Patient limited by fatigue  [] Patient limited by pain   [] Patient limited by medical complications:    [] Adverse reaction to Tx:   [] Significant change in status    Safety:       []  bed alarm set    [x]  chair alarm set    []  Pt refused alarms                []  Telesitter activated      [x]  Gait belt used during tx session      []other:       Number of Minutes/Billable Intervention  Therapeutic Exercise    ADL Self-care 45   Neuro Re-Ed    Therapeutic Activity 15   Group    Other:    TOTAL 60       Social History  Social/Functional History  Lives With: Alone  Type of Home: (Pt reports living at [facility])  Home Layout: One level  Home Access: Stairs to enter without rails  Entrance Stairs - Number of Steps: threshold step at the door (garage entrance)  Bathroom Shower/Tub: Tub/Shower unit, Walk-in shower, Shower chair with back(soaked in tub at baseline)  Bathroom Toilet: Standard  Bathroom Equipment: Toilet raiser  Bathroom Accessibility: (2nd bathroom c walk-in shower is w/c/fww. However pt reports use of tu/shower bathroom and is not w/c and walker accessible.)  Home Equipment: Cane  ADL Assistance: Independent  Homemaking Responsibilities: Yes  Meal Prep Responsibility: Primary  Laundry Responsibility: Primary  Cleaning Responsibility: Primary  Bill Paying/Finance Responsibility: Primary(paige Griggs)  is POA)  Shopping Responsibility: Primary  Dependent Care Responsibility: No  Health Care Management: Primary  Ambulation Assistance: Independent  Transfer Assistance: Independent  Active : No  Patient's  Info: Pt uses [employer]  Mode of Transportation: Bus  Occupation: Retired  Leisure & Hobbies: Pt reports being very busy however enjoys painting: water color and oil painting. Pt also enjoys reading. Pt also reports enjoying walks however d/t balance concerns pt has stopped.   Additional Comments: sleeps in regular,  flat bed; ~3 falls in the past year with recent fall requiring hospitalization due to injury    Objective                                                                                    Goals:  (Update in navigator)  Short term goals  Time Frame for Short term goals: STG=LTG:  Long term goals  Time Frame for Long term goals : 7-10 days or until d/c  Long term goal 1: Pt will complete feeding/grooming/oral care task c MOD I by d/c  Long term goal 2: Pt will complete total body bathing c MOD I by d/c  Long term goal 3: Pt will complete total body dressing (UB/LB/Footwear) c MOD I by d/c  Long term goal 4: Pt will complete toileting c MOD I by d/c  Long term goal 5: Pt will complete functional transfer (toilet, tub, shower) c MOD I by d/c. Long term goals 6: Pt will perform therex/therax to facilitate increased strength/endurance/ax tolerance (c emphasis on dynamic standing balance/tolerance >10 mins and BUE endurance) c MOD I in order to complete ADLs. :        Plan of Care                                                                              Times per week: 5 days per week for a minimum of 60 minutes/day plus group as appropriate for 60 minutes.   Treatment to include Plan  Times per day: Daily  Current Treatment Recommendations: Strengthening, Patient/Caregiver Education & Training, Equipment Evaluation, Education, & procurement, Self-Care / ADL, Pain Management, Balance Training, Home Management Training, Functional Mobility Training, Safety Education & Training, ROM    Electronically signed by   BERTRAND Barnett  3/10/2021, 6:23 PM

## 2021-03-10 NOTE — PROGRESS NOTES
Occupational Therapy  . Physical Rehabilitation: OCCUPATIONAL THERAPY     [x] daily progress note       [] discharge       Patient Name:  Pa Silva   :  1923 MRN: [id number]  Room:  [address] [place] [address] Date of Admission: 2021  Rehabilitation Diagnosis:   Fracture of unspecified part of neck of right femur, initial encounter for closed fracture [S72.001A]  Closed displaced fracture of right femoral neck ([address] [S72.001A]       Date 3/10/2021       Day of ARU Week:  4   Time IN/OUT 1615 /    Individual Tx Minutes    Group Tx Minutes    Co-Treat Minutes    Concurrent Tx Minutes    TOTAL Tx Time Mins    Variance Time    Variance Time []   Refusal due to:     []   Medical hold/reason:    []   Illness   []   Off Unit for test/procedure  []   Extra time needed to complete task  []   Therapeutic need  []   Other (specify):   Restrictions Restrictions/Precautions  Restrictions/Precautions: Fall Risk, Weight Bearing, ROM Restrictions  Position Activity Restriction  Hip Precautions: No hip flexion > 90 degrees, No hip internal rotation, No ADduction, Posterior hip precautions   Communication with other providers: [x]   OK to see per nursing:     []   Spoke with team member regarding:      Subjective observations and cognitive status: \"I would love to wash at the sink and change clothes. \"     Pain level/location:   0 /10       Location: denies pain, reports pressure in groin   Discharge recommendations  Anticipated discharge date:  3/13  Destination: []home alone   []home alone w assist prn [] home w/ family    [] Continuous supervision       []SNF            [x] Assisted living     [] Other:   Continued therapy: [x]HHC OT  []OUTPATIENT  OT   [] No Further OT  Equipment needs: none, pt reports having shower chair in walk in bathroom.    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 transition between stars)    Eating/Feeding:     ***   Extremity Used:        []    R UE []    L UE         Dentures: []   N/A    []    Partial []    Full  Adaptive Equipment Used:        Grooming:   ***  Oral Hygiene:  ***      Bathing:   ***      Dressing:      Upper Body Dressing:  ***  Lower Body Dressing:  ***  Footwear: ***    Toileting:   ***       Toilet Transfers:   ***  Device Used:    []   Standard Toilet         []   Alba Sheen           []  Bedside Commode       []   Elevated Toilet          []   Other:        Tub/Shower Transfer:   ***  Device Used:    []   Shower Bench      []   Shower Chair      []   Tub Transfer Bench           []   Bathtub    []   Shower         []   Other:         Bed Mobility:           []   Pt received out of bed   Rolling R/L:  ***  Scooting:  ***  Supine --> Sit:  ***  Sit --> Supine:  ***    Transfers:    Sit--> Stand:  ***  Stand --> Sit:   ***  Stand-Pivot:   ***  Other:    Assistive device required for transfer:   ***      Functional Mobility:    Assistance:  ***  Device:   []   Rolling Walker     []   Standard Walker []   Wheelchair        []   [employer]       []   4-Wheeled [place]         []   Cardiac Walker       []   Other:        Homemaking Tasks:   ***      Additional Therapeutic activities/exercises completed this date:     []   ADL Training   []   Balance/Postural training     []   Bed/Transfer Training   []   Endurance Training   []   Neuromuscular Re-ed   []   Nu-step:  Time:        Level:         #Steps:       []   Rebounder:    []  Seated     []  Standing        []   Supine Ther Ex (reps/sets):     []   Seated Ther Ex (reps/sets):     []   Standing Ther Ex (reps/sets):     []   Other:      Comments:      Patient/Caregiver Education and Training:   []   [employer] Equipment Use  []   Bed Mobility/Transfer Technique/Safety  []   Energy Conservation Tips  []   Family training  []   Postural Awareness  []   Safety During Functional Activities  []   Reinforced Patient's Precautions   []   Progress was updated and reviewed in Rehabtracker with patient and/or family this         date. Treatment Plan for Next Session: ***      Assessment:   This pt demonstrated a ***positive  ***negative response to today's treatment as evidenced by ***. The patient is making *** progress toward established goals as evidenced by QI scores. Ongoing deficits are observed in the areas of *** and continued focus on *** is recommended. Treatment/Activity Tolerance:   [] Tolerated treatment with no adverse effects    [] Patient limited by fatigue  [] Patient limited by pain   [] Patient limited by medical complications:    [] Adverse reaction to Tx:   [] Significant change in status    Safety:       []  bed alarm set    []  chair alarm set    []  Pt refused alarms                []  Telesitter activated      []  Gait belt used during tx session      []other:       Number of Minutes/Billable Intervention  Therapeutic Exercise    ADL Self-care    Neuro Re-Ed    Therapeutic Activity    Group    Other:    TOTAL        Social History  Social/Functional History  Lives With: Alone  Type of Home: (Pt reports living at [facility])  Home Layout: One level  Home Access: Stairs to enter without rails  Entrance Stairs - Number of Steps: threshold step at the door (garage entrance)  Bathroom Shower/Tub: Tub/Shower unit, Walk-in shower, Shower chair with back(soaked in tub at baseline)  Bathroom Toilet: Standard  Bathroom Equipment: Toilet raiser  Bathroom Accessibility: (2nd bathroom c walk-in shower is w/c/fww.  However pt reports use of tu/shower bathroom and is not w/c and walker accessible.)  Home Equipment: Cane  ADL Assistance: Independent  Homemaking Responsibilities: Yes  Meal Prep Responsibility: Primary  Laundry Responsibility: Primary  Cleaning Responsibility: Primary  Bill Paying/Finance Responsibility: Primary(paige Griggs)  is POA)  Shopping Responsibility: Primary  Dependent Care Responsibility: No  Health Care Management: Primary  Ambulation Assistance: Independent  Transfer Assistance: Independent  Active : No  Patient's  Info: Pt uses [employer]  Mode of Transportation: Bus  Occupation: Retired  Leisure & Hobbies: Pt reports being very busy however enjoys painting: water color and oil painting. Pt also enjoys reading. Pt also reports enjoying walks however d/t balance concerns pt has stopped. Additional Comments: sleeps in regular,  flat bed; ~3 falls in the past year with recent fall requiring hospitalization due to injury    Objective                                                                                    Goals:  (Update in navigator)  Short term goals  Time Frame for Short term goals: STG=LTG:  Long term goals  Time Frame for Long term goals : 7-10 days or until d/c  Long term goal 1: Pt will complete feeding/grooming/oral care task c MOD I by d/c  Long term goal 2: Pt will complete total body bathing c MOD I by d/c  Long term goal 3: Pt will complete total body dressing (UB/LB/Footwear) c MOD I by d/c  Long term goal 4: Pt will complete toileting c MOD I by d/c  Long term goal 5: Pt will complete functional transfer (toilet, tub, shower) c MOD I by d/c. Long term goals 6: Pt will perform therex/therax to facilitate increased strength/endurance/ax tolerance (c emphasis on dynamic standing balance/tolerance >10 mins and BUE endurance) c MOD I in order to complete ADLs. :        Plan of Care                                                                              Times per week: 5 days per week for a minimum of 60 minutes/day plus group as appropriate for 60 minutes.   Treatment to include Plan  Times per day: Daily  Current Treatment Recommendations: Strengthening, Patient/Caregiver Education & Training, Equipment Evaluation, Education, & procurement, Self-Care / ADL, Pain Management, Balance Training, Home Management Training, Functional Mobility Training, Safety Education & Training, ROM    Electronically signed by   BERTRAND Connors  3/10/2021, 4:21 PM

## 2021-03-10 NOTE — PLAN OF CARE
Problem: Falls - Risk of:  Goal: Will remain free from falls  Description: Will remain free from falls  Outcome: Ongoing  Goal: Absence of physical injury  Description: Absence of physical injury  Outcome: Ongoing     Problem: Infection:  Goal: Will remain free from infection  Description: Will remain free from infection  Outcome: Ongoing     Problem: Safety:  Goal: Free from accidental physical injury  Description: Free from accidental physical injury  Outcome: Ongoing  Goal: Free from intentional harm  Description: Free from intentional harm  Outcome: Ongoing     Problem: Daily Care:  Goal: Daily care needs are met  Description: Daily care needs are met  Outcome: Ongoing     Problem: Skin Integrity:  Goal: Skin integrity will stabilize  Description: Skin integrity will stabilize  Outcome: Ongoing     Problem: Discharge Planning:  Goal: Patients continuum of care needs are met  Description: Patients continuum of care needs are met  Outcome: Ongoing

## 2021-03-10 NOTE — PROGRESS NOTES
Comprehensive Nutrition Assessment    Type and Reason for Visit:  Reassess    Nutrition Recommendations/Plan:   Continue general diet with oral nutrition supplements  Encourage meal intake   Will continue to follow up     Nutrition Assessment:  Meal intake remains % on generald diet with supplements. Intake improving suring stay, reasonable for advanced age. Remains moderate nutrition risk with increased needs. Malnutrition Assessment:  Malnutrition Status: At risk for malnutrition (Comment)    Context:  Acute Illness       Estimated Daily Nutrient Needs:  Energy (kcal):  8594-3209 (27-30 shalonda/kg); Weight Used for Energy Requirements:  Current     Protein (g):  75 (1.5 g/kg);  Weight Used for Protein Requirements:  Current        Fluid (ml/day):  2107-1104; Method Used for Fluid Requirements:  1 ml/kcal      Nutrition Related Findings:  working with therapy      Wounds:  Surgical Incision       Current Nutrition Therapies:    DIET GENERAL;  Dietary Nutrition Supplements: Frozen Oral Supplement  Dietary Nutrition Supplements: Standard High Calorie Oral Supplement    Anthropometric Measures:  · Height: 5' 2.99\" (160 cm)  · Current Body Weight: 102 lb 4.7 oz (46.4 kg)   · Admission Body Weight:      · Usual Body Weight: (per pt 100#)     · Ideal Body Weight: 115 lbs; % Ideal Body Weight 96.4 %   · BMI: 18.1  · Adjusted Body Weight:  ; No Adjustment   · Adjusted BMI:      · BMI Categories: Underweight (BMI less than 22) age over 72       Nutrition Diagnosis:   · Predicted inadequate energy intake related to increase demand for energy/nutrients as evidenced by BMI, wounds      Nutrition Interventions:   Food and/or Nutrient Delivery:  Continue Current Diet, Continue Oral Nutrition Supplement  Nutrition Education/Counseling:  No recommendation at this time   Coordination of Nutrition Care:  Continue to monitor while inpatient, Feeding Assistance/Environment Change    Goals:  Patient will consume at least 50-75% at meals during stay       Nutrition Monitoring and Evaluation:   Behavioral-Environmental Outcomes:  None Identified   Food/Nutrient Intake Outcomes:  Diet Advancement/Tolerance, Food and Nutrient Intake, Supplement Intake  Physical Signs/Symptoms Outcomes:  Biochemical Data, Meal Time Behavior, Skin, Weight     Discharge Planning:    Continue current diet     Electronically signed by Cassidy Estes RD, LD on 3/10/21 at 4:07 PM EST    Contact: [phone]

## 2021-03-10 NOTE — FLOWSHEET NOTE
[x] daily progress note       [] discharge       Patient Name:  Benitez Roberts   :  1923 MRN: [id number]  Room:  [address] [place] [address] Date of Admission: 2021  Rehabilitation Diagnosis:   Fracture of unspecified part of neck of right femur, initial encounter for closed fracture [S72.001A]  Closed displaced fracture of right femoral neck ([address] [S72.001A]       Date 3/10/2021       Day of ARU Week:  4   Time IN/OUT 0223-2136  [id number]   Individual Tx Minutes 120   TOTAL Tx Time Mins 120   Restrictions Restrictions/Precautions  Restrictions/Precautions: Fall Risk, Weight Bearing, ROM Restrictions  Position Activity Restriction  Hip Precautions: No hip flexion > 90 degrees, No hip internal rotation, No ADduction, Posterior hip precautions   Communication with other providers: [x]   OK to see per nursing:     []   Spoke with team member regarding:      Subjective observations and cognitive status: AM session: pt seen in semi-link's position in bed at beginning of treatment. Agreeable to therapy. PM session: pt seen in semi-link's position in bed at beginning of treatment. Agreeable to therapy. Pain level/location: 0/10         Discharge recommendations  Anticipated discharge date:  2021  Destination: []???home alone   []? ??home alone with assist PRN     []? ?? home w/ family      []? ?? Continuous supervision  []? ??SNF    []??? Assisted living     []? ?? Other:   Continued therapy: []???C PT  []???OUTPATIENT  PT   []??? No Further PT  Equipment needs: Becky LITTLE Shahab Man requires the assistance of a wheeled walker to successfully ambulate from room to room at home to allow completion of daily living tasks such as: bathing, toileting, dressing and grooming. A wheeled walker is necessary due to the patient's unsteady gait, upper body weakness, inability to  a standard walker. This patient can ambulate only by pushing a walker instead of using a lesser assistive device such as a cane or crutch. Bed Mobility:           [x]   Pt received out of bed   Rolling R/L:  Independent (with pillow between LEs)   Scooting:  Independent   Lying --> Sit:  Independent   Sit --> lying:  Independent  Practiced in regular bed     Transfers:    Sit--> Stand:  Supervision   Stand --> Sit:   Supervision   Chair-->Bed/Bed --> Chair:   Supervision   Car Transfers: Mod I (pt did well with following correct hand placement)   Assistive device required for transfer:   RW  All other transfers required vcs for proper hand placement. PM session: practiced 16 trials of ambulating to and from chairs and practicing sit<->stand transfers. Pt demonstrated correct hand placement and walker positioning. Pt also practiced armless chair with min vcs for proper technique and positioning. Discussed for patient to abide by hip precautions and to not sit in low chairs. Gait:    Walk 10' (mod I)   Walk 48' with two turns (mod I)  Walk 150' (mod I)  Other Distances: AM session: 500'+200'+170'; PM session: 30'+30'   Assistance: Mod I   Device:  RW  Gait Quality:  Reciprocal pattern     Stairs   Up/down curb: supervision (Vcs for proper walker safety)   Up/down 4 steps: Mod I  Up/down 12 steps (flight): mod I    Assistance: Mod I   Supportive Device:  2 rails     Uneven Surfaces:       Assistance: Mod I   Device:    RW  Surfaces Completed:   [x]  Carpeted Surface      []  Throw rugs       []  Ramp       []  Outdoor pavements        []  Grass             []  Loose gravel        []  Other:       Pt picked up item from floor mod I with reacher     Additional Therapeutic activities/exercises completed this date:     []   Nu-step:  Time:        Level:         #Steps:       []   Rebounder:    []  Seated     []  Standing        []   Balance training         []   Postural training    [x]   Supine ther ex (reps/sets): PM session: Ankle pumps, quad sets, buttock squeezes, heel slides, SAQs, hip abduction x 20 reps each for strengthening.       [x] Seated ther ex (reps/sets): PM session: LAQs, marching, knee bends x 20 reps each for strengthening. []   Standing ther ex (reps/sets):     []   Picking up object from floor (standing):                   []   Reacher used   [x]   Other: pt did require vcs to maintain hip precautions while sitting EOB. Pt attempted to flex trunk to fix pants. Pt required vcs to not do this movement. []   Other:    Patient/Caregiver Education and Training:   [x]   Bed Mobility/Transfer technique/safety  [x]   Gait technique/sequencing  [x]   Proper use of assistive device  [x]   Advanced mobility safety and technique  [x]   Reinforced patient's precautions/mobility while maintaining precautions  []   Postural awareness  []   Family training  [x]   Progress was updated and reviewed in Rehabtracker with patient and/or family this date. Treatment Plan for Next Session: gait; transfers; advanced gait; stair training; exercises; balance training    Assessment: This pt demonstrated a positive response to today's treatment as evidenced by improved safety . The patient is making progress toward established goals as evidenced by QI scores. Ongoing deficits are observed in the areas of transfer safety (sit<->stand) and continued focus on transfer safety (sit<->stand) is recommended. Treatment/Activity Tolerance:   [x] Tolerated treatment with no adverse effects    [] Patient limited by fatigue  [] Patient limited by pain   [] Patient limited by medical complications:    [] Adverse reaction to Tx:   [] Significant change in status    Safety:       [x]  bed alarm set    []  chair alarm set    []  Pt refused alarms                []  Telesitter activated      [x]  Gait belt used during tx session      [x]other: After AM session: pt left in semi-link's position in bed with call light at end of treatment. After PM session: pt left in semi-link's position in bed with call light at end of treatment.          Number of Minutes/Billable Intervention  Gait Training 75   Therapeutic Exercise 30   Neuro Re-Ed    Therapeutic Activity 15   Wheelchair Propulsion    Group    Other:    TOTAL 120         Social History  Social/Functional History  Lives With: Alone  Type of Home: (Pt reports living at [facility])  Home Layout: One level  Home Access: Stairs to enter without rails  Entrance Stairs - Number of Steps: threshold step at the door (garage entrance)  Bathroom Shower/Tub: Tub/Shower unit, Walk-in shower, Shower chair with back(soaked in tub at baseline)  [employer]: [address] [place] [address] [address]: Toilet raiser  Bathroom Accessibility: (2nd bathroom c walk-in shower is w/c/fww. However pt reports use of tu/shower bathroom and is not w/c and walker accessible.)  Home Equipment: Cane  ADL Assistance: Independent  Homemaking Responsibilities: Yes  Meal Prep Responsibility: Primary  Laundry Responsibility: Primary  Cleaning Responsibility: Primary  Bill Paying/Finance Responsibility: Primary(niece Zain Crawford)  is POA)  Shopping Responsibility: Primary  Dependent Care Responsibility: No  Health Care Management: Primary  Ambulation Assistance: Independent  Transfer Assistance: Independent  Active : No  Patient's  Info: Pt uses [employer]  Mode of Transportation: Bus  Occupation: Retired  Leisure & Hobbies: Pt reports being very busy however enjoys painting: water color and oil painting. Pt also enjoys reading. Pt also reports enjoying walks however d/t balance concerns pt has stopped.   Additional Comments: sleeps in regular,  flat bed; ~3 falls in the past year with recent fall requiring hospitalization due to injury    Objective                                                                                    Goals:  (Update in navigator)  Short term goals  Time Frame for Short term goals: 10 tx days:  Short term goal 1: Pt will complete bed mobility tasks (rolling L/R, scooting, and sup<->sit) Ind following posterior hip precautions. Short term goal 2: Pt will complete OOB transfers using RW Mod Ind. Short term goal 3: Pt will ambulate 150 ft using RW over level surface and 10 ft over carpeted/transitional surface with SBA. Short term goal 4: Goal progressed 03/04/2021: Pt will ascend/descend curb step using RW with Sup. following appropriate step sequencing. Short term goal 5: Pt will complete object retrieval from the floor with 1UE support on RW and using reacher Mod Ind. Short term goal 6: Pt will propel manual w/c >/=150 ft Mod Ind. Short term goal 7: Goal added 03/04/2021: Pt will ascend/descend 4-5 steps using railings with Sup following appropriate step sequencing.:   :        Plan of Care                                                                              Times per week: 5 days per week for a minimum of 60 minutes/day plus group as appropriate for 60 minutes.   Treatment to include Current Treatment Recommendations: Strengthening, Gait Training, Patient/Caregiver Education & Training, ROM, Equipment Evaluation, Education, & procurement, Balance Training, Pain Management, Functional Mobility Training, Endurance Training, Home Exercise Program, Positioning, Transfer Training, Safety Education & Training, Wheelchair Mobility Training    Electronically signed by   Stephen Dowling, [id number]  3/10/2021, 10:02 AM

## 2021-03-11 PROCEDURE — 97535 SELF CARE MNGMENT TRAINING: CPT

## 2021-03-11 PROCEDURE — 97116 GAIT TRAINING THERAPY: CPT

## 2021-03-11 PROCEDURE — 97150 GROUP THERAPEUTIC PROCEDURES: CPT

## 2021-03-11 PROCEDURE — 97530 THERAPEUTIC ACTIVITIES: CPT

## 2021-03-11 PROCEDURE — 99233 SBSQ HOSP IP/OBS HIGH 50: CPT | Performed by: PHYSICAL MEDICINE & REHABILITATION

## 2021-03-11 PROCEDURE — 94761 N-INVAS EAR/PLS OXIMETRY MLT: CPT

## 2021-03-11 PROCEDURE — 6370000000 HC RX 637 (ALT 250 FOR IP): Performed by: INTERNAL MEDICINE

## 2021-03-11 PROCEDURE — 6370000000 HC RX 637 (ALT 250 FOR IP): Performed by: PHYSICAL MEDICINE & REHABILITATION

## 2021-03-11 PROCEDURE — 6360000002 HC RX W HCPCS: Performed by: INTERNAL MEDICINE

## 2021-03-11 PROCEDURE — 97110 THERAPEUTIC EXERCISES: CPT

## 2021-03-11 PROCEDURE — 1280000000 HC REHAB R&B

## 2021-03-11 PROCEDURE — 94150 VITAL CAPACITY TEST: CPT

## 2021-03-11 RX ADMIN — ASPIRIN 81 MG: 81 TABLET, COATED ORAL at 08:37

## 2021-03-11 RX ADMIN — GABAPENTIN 100 MG: 100 CAPSULE ORAL at 08:37

## 2021-03-11 RX ADMIN — TRAMADOL HYDROCHLORIDE 50 MG: 50 TABLET, FILM COATED ORAL at 21:16

## 2021-03-11 RX ADMIN — DOCUSATE SODIUM 100 MG: 100 CAPSULE, LIQUID FILLED ORAL at 08:38

## 2021-03-11 RX ADMIN — TRAMADOL HYDROCHLORIDE 50 MG: 50 TABLET, FILM COATED ORAL at 06:07

## 2021-03-11 RX ADMIN — CLONIDINE HYDROCHLORIDE 0.1 MG: 0.1 TABLET ORAL at 08:37

## 2021-03-11 RX ADMIN — ENOXAPARIN SODIUM 30 MG: 30 INJECTION SUBCUTANEOUS at 08:38

## 2021-03-11 RX ADMIN — GABAPENTIN 100 MG: 100 CAPSULE ORAL at 21:16

## 2021-03-11 RX ADMIN — SENNOSIDES 8.6 MG: 8.6 TABLET, FILM COATED ORAL at 21:16

## 2021-03-11 RX ADMIN — LOSARTAN POTASSIUM 50 MG: 50 TABLET, FILM COATED ORAL at 08:38

## 2021-03-11 RX ADMIN — CLONIDINE HYDROCHLORIDE 0.1 MG: 0.1 TABLET ORAL at 21:16

## 2021-03-11 RX ADMIN — DILTIAZEM HYDROCHLORIDE 240 MG: 240 CAPSULE, COATED, EXTENDED RELEASE ORAL at 08:37

## 2021-03-11 ASSESSMENT — PAIN DESCRIPTION - FREQUENCY: FREQUENCY: CONTINUOUS

## 2021-03-11 ASSESSMENT — PAIN DESCRIPTION - ONSET: ONSET: ON-GOING

## 2021-03-11 ASSESSMENT — PAIN DESCRIPTION - PROGRESSION: CLINICAL_PROGRESSION: NOT CHANGED

## 2021-03-11 ASSESSMENT — PAIN DESCRIPTION - ORIENTATION: ORIENTATION: RIGHT

## 2021-03-11 ASSESSMENT — PAIN SCALES - GENERAL
PAINLEVEL_OUTOF10: 0
PAINLEVEL_OUTOF10: 0

## 2021-03-11 ASSESSMENT — PAIN - FUNCTIONAL ASSESSMENT: PAIN_FUNCTIONAL_ASSESSMENT: ACTIVITIES ARE NOT PREVENTED

## 2021-03-11 NOTE — CARE COORDINATION
Patient reviewed at today's care conference and is on track for d/c home [place] [address] [place] [address] pt/ot 3/13. Patient's niece will transport. RW was ordered earlier today from [facility] for delivery 3/12. Patient will be referred to [place] [id number]. Patient reports anxiety about d/c home, but is sup to Mod I today. 1330:  Case mgt attempted to meet with patient in room. Patient sleeping soundly  1400:  Case mgt attempted to meet with patient in room. Patient sleeping soundly. 1420:  Case mgt spoke with patient's niece. Reviewed patient's current LOF. Niece is aware that patient is worried about d/c home alone. Niece will be in and out at discharge and was reassured of patient returning about once she heard LOF. She's wondering about staple removal and patient's appetite. Tigist Jeffrey thinks patient has lost weight which is confirmed in the chart. Patient has had appetite loss for a \"few months\". She doesn't like our supplements. Tigist Jeffrey inquired about appetite stimulant. Case mgt will bring to Dr Elyse Silva attention and let Tigist Jeffrey know tomorrow. She will provide d/c transport saturday.       8202 notes & orders faxed to [facility]

## 2021-03-11 NOTE — PROGRESS NOTES
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 No new chills or cough. Improving right leg pain. Prior (baseline) level of function: Independent. Current level of function:         Current  IRF-MUSTAPHA and Goals:   Occupational Therapy:    Short term goals  Time Frame for Short term goals: STG=LTG :   Long term goals  Time Frame for Long term goals : 7-10 days or until d/c  Long term goal 1: Pt will complete feeding/grooming/oral care task c MOD I by d/c  Long term goal 2: Pt will complete total body bathing c MOD I by d/c  Long term goal 3: Pt will complete total body dressing (UB/LB/Footwear) c MOD I by d/c  Long term goal 4: Pt will complete toileting c MOD I by d/c  Long term goal 5: Pt will complete functional transfer (toilet, tub, shower) c MOD I by d/c. Long term goals 6: Pt will perform therex/therax to facilitate increased strength/endurance/ax tolerance (c emphasis on dynamic standing balance/tolerance >10 mins and BUE endurance) c MOD I in order to complete ADLs.  :                                       Eating: Eating  Assistance Needed: Independent  Comment: X  CARE Score: 6  Discharge Goal: Independent       Oral Hygiene: Oral Hygiene  Assistance Needed: Supervision or touching assistance  Comment: Sup in stance at sink  CARE Score: 4  Discharge Goal: Independent    UB/LB Bathing: Shower/Bathe Self  Assistance Needed: Supervision or touching assistance  Comment: Sup in stance for melony / buttocks wash, seated for remainder c use of AE, occasional cue for posterior hip precautions  CARE Score: 4  Discharge Goal: Independent    UB Dressing: Upper Body Dressing  Assistance Needed: Setup or clean-up assistance  Comment: Sup for doffing / donning in stance  CARE Score: 5  Discharge Goal: Independent         LB Dressing: Lower Body Dressing  Assistance Needed: Supervision or touching assistance  Comment: Sup + min cues for AE to doff / don underwear and pants, perform clothing hikes  CARE Score: 4  Discharge Goal: Independent    Donning and [place] Footwear: Putting On/Taking Off Footwear  Assistance Needed: Supervision or touching assistance  Comment: Sup + sock aid, min cues for technique  CARE Score: 4  Discharge Goal: Independent      Toileting: Toileting Hygiene  Assistance Needed: Supervision or touching assistance  Comment: Sup for clothing mgmt and hygiene  CARE Score: 4  Discharge Goal: Independent      Toilet Transfers: Toilet Transfer  Assistance Needed: Supervision or touching assistance  Comment: Sup c RW + one cue for safe body positioning  CARE Score: 4  Discharge Goal: Independent    Physical Therapy:   Short term goals  Time Frame for Short term goals: 10 tx days:  Short term goal 1: Pt will complete bed mobility tasks (rolling L/R, scooting, and sup<->sit) Ind following posterior hip precautions. Short term goal 2: Pt will complete OOB transfers using RW Mod Ind. Short term goal 3: Pt will ambulate 150 ft using RW over level surface and 10 ft over carpeted/transitional surface with SBA. Short term goal 4: Goal progressed 03/04/2021: Pt will ascend/descend curb step using RW with Sup. following appropriate step sequencing. Short term goal 5: Pt will complete object retrieval from the floor with 1UE support on RW and using reacher Mod Ind. Short term goal 6: Pt will propel manual w/c >/=150 ft Mod Ind. Short term goal 7: Goal added 03/04/2021: Pt will ascend/descend 4-5 steps using railings with Sup following appropriate step sequencing. Bed Mobility:   Sit to Lying  Assistance Needed: Independent  Comment: in regular bed  CARE Score: 6  Discharge Goal: Independent  Roll Left and Right  Assistance Needed: Independent  Comment: in regular bed with pillow between legs for hip precautions. CARE Score: 6  Discharge Goal: Independent  Lying to Sitting on Side of Bed  Assistance Needed: Independent  Comment: in regular bed  CARE Score: 6  Discharge Goal: Independent    Transfers:    Sit to Stand  Assistance Needed: Supervision or touching assistance  Comment: Supervision; vcs for proper hand placement. CARE Score: 4  Discharge Goal: Independent  Chair/Bed-to-Chair Transfer  Assistance Needed: Supervision or touching assistance  Comment: Supervision; vcs for proper hand placement.   CARE Score: 4  Discharge Goal: Independent  Toilet Transfer  Assistance Needed: Partial/moderate assistance(Min A using grab bars)  CARE Score: 3  Car Transfer  Assistance Needed: Independent  Comment: x  CARE Score: 6  Discharge Goal: Independent    Ambulation:    Walking Ability  Does the Patient Walk?: Yes     Walk 10 Feet  Assistance Needed: Independent  Comment: mod I with RW  CARE Score: 6  Discharge Goal: Supervision or touching assistance     Walk 50 Feet with Two Turns  Assistance Needed: Independent  Comment: mod I with RW  Reason if not Attempted: Not attempted due to medical condition or safety concerns(pt was only able to tolerate 18 ft today (limited by fatigue and dizziness))  CARE Score: 6  Discharge Goal: Supervision or touching assistance     Walk 150 Feet  Assistance Needed: Independent  Comment: mod I with RW  Reason if not Attempted: Not attempted due to medical condition or safety concerns  CARE Score: 6  Discharge Goal: Supervision or touching assistance     Walking 10 Feet on Uneven Surfaces  Assistance Needed: Independent  Comment: mod I with RW  CARE Score: 6  Discharge Goal: Supervision or touching assistance     1 Step (Curb)  Assistance Needed: Supervision or touching assistance  Comment: supervision with RW. Vcs for walker safety. Reason if not Attempted: Not attempted due to medical condition or safety concerns  CARE Score: 4  Discharge Goal: Supervision or touching assistance     4 Steps  Assistance Needed: Independent  Comment: Mod I with 2 rails  Reason if not Attempted: Not applicable  CARE Score: 6  Discharge Goal: Not Applicable     12 Steps  Assistance Needed: Independent  Comment: mod I with 2 rails  Reason if not Attempted: Not applicable  CARE Score: 6  Discharge Goal: Not Applicable       Wheelchair:  w/c Ability: Wheelchair Ability  Uses a Wheelchair and/or Scooter?: No(pt ambulating with RW at this time.)  Wheel 50 Feet with Two Turns  Assistance Needed: Supervision or touching assistance(SBA-Sup)  CARE Score: 4  Discharge Goal: Independent  Wheel 150 Feet  Assistance Needed: Partial/moderate assistance(pt was only able to propel up to 75 ft with SBA-Sup; activity tolerance was limited by fatigue and worsening lethargy)  CARE Score: 3  Discharge Goal: Independent          Balance:        Object: Picking Up Object  Assistance Needed: Independent  Comment: mod I with reacher  CARE Score: 6  Discharge Goal: Independent    I      Exam:    Blood pressure (!) 130/56, pulse 71, temperature 96.1 °F (35.6 °C), temperature source Oral, resp. rate 16, height 5' 2.99\" (1.6 m), weight 102 lb 3.2 oz (46.4 kg), SpO2 93 %. General: Up in a wheelchair. Alert. Gazing about the room. No distress. HEENT: Clear speech. MMM. Neck supple. Pulmonary: No rales, rhonchi or coughing. Cardiac: RRR. Abdomen: Patient's abdomen is soft and nondistended. Bowel sounds were present throughout. There was no rebound, guarding or masses noted. Upper extremities: Functional  and coordination. No new bruising. Lower extremities: Improving active right hip flexion. Bon approximate skin edges well.   The right thigh is less swollen. Sitting balance was good. Standing balance was fair-.    Lab Results   Component Value Date    WBC 7.2 03/03/2021    HGB 9.8 (L) 03/03/2021    HCT 32.1 (L) 03/03/2021    MCV 97.0 03/03/2021     03/03/2021     Lab Results   Component Value Date    INR 0.91 02/22/2021    PROTIME 11.0 (L) 02/22/2021     Lab Results   Component Value Date    CREATININE 1.1 03/03/2021    BUN 26 (H) 03/03/2021     03/03/2021    K 4.7 03/03/2021     03/03/2021    CO2 31 03/03/2021     Lab Results   Component Value Date     (H) 02/23/2021     (H) 02/23/2021    ALKPHOS 135 (H) 02/23/2021    BILITOT 1.2 (H) 02/23/2021       Expected length of stay  prior to a supervised level of function for discharge home with a walker and [place] [facility] OT/PT is 3/13/2021. Recommendations:    1. Right femoral neck fracture with hemiarthroplasty: Anticipated she will transition to home health care therapies when going back to her independent living apartment. It will be important to continue the momentum she has from participating in her daily occupational and physical therapy.  Continues to tolerate the pulmonary hygiene, DVT prophylaxis and cautious pain management.  Consistently continent of bowel and bladder.     More consistent with her hip precautions.  Adaptive equipment education.  Verbal cues and contact-guard assistance for transfers today.  She will need to use a walker and wheelchair after discharge. 2. DVT prophylaxis: Lovenox 30 mg subcu daily.  I must monitor her hemoglobin and platelet count periodically while on this medication.  Weightbearing activities are steadily improving now.  GI prophylaxis offered.  No new bruising or swelling.   3. Uncontrolled hypertension: Much less dizziness now on her current regimen of Cardizem, Catapres and Cozaar.  Target systolic blood pressure is 120-140.  Vital signs are checked at rest and with activity.  Blood pressure more consistent today       4. Obstipation: Working to stay ahead of her constipation with oral meds now.    Positive flatus. 5. Lumbar DDD with sciatica: Diathermy, therapeutic exercises and careful adjustments of gait training to accommodate her foot drop.  Her chronic leg pain is a little less troublesome during therapy recently. .  6. Nausea with emesis: Zofran is used infrequently now.  Regular bowel intervention continues.    Symptoms are not limiting her oral intake.

## 2021-03-11 NOTE — PROGRESS NOTES
Physical Rehabilitation: OCCUPATIONAL THERAPY     [x] daily progress note       [] discharge       Patient Name:  Lonnie Villatoro   :  1923 MRN: [id number]  Room:  [address] [place] [address] Date of Admission: 2021    Rehabilitation Diagnosis:     Fracture of unspecified part of neck of right femur, initial encounter for closed fracture [S72.001A]  Closed displaced fracture of right femoral neck ([address] [S72.001A]    Social History  Social/Functional History  Lives With: Alone  Type of Home: (Pt reports living at [facility])  Home Layout: One level  Home Access: Stairs to enter without rails  Entrance Stairs - Number of Steps: threshold step at the door (garage entrance)  Bathroom Shower/Tub: Tub/Shower unit, Walk-in shower, Shower chair with back(soaked in tub at baseline)  [employer]: [address]: Toilet raiser  Bathroom Accessibility: (2nd bathroom c walk-in shower is w/c/fww. However pt reports use of tu/shower bathroom and is not w/c and walker accessible.)  Home Equipment: Cane  ADL Assistance: Independent  Homemaking Responsibilities: Yes  Meal Prep Responsibility: Primary  Laundry Responsibility: Primary  Cleaning Responsibility: Primary  Bill Paying/Finance Responsibility: Primary(niece Efren Buckner)  is POA)  Shopping Responsibility: Primary  Dependent Care Responsibility: No  Health Care Management: Primary  Ambulation Assistance: Independent  Transfer Assistance: Independent  Active : No  Patient's  Info: Pt uses [facility]  Mode of Transportation: Bus  Occupation: Retired  Leisure & Hobbies: Pt reports being very busy however enjoys painting: water color and oil painting. Pt also enjoys reading. Pt also reports enjoying walks however d/t balance concerns pt has stopped.   Additional Comments: sleeps in regular,  flat bed; ~3 falls in the past year with recent fall requiring hospitalization due to injury    Objective Goals:  (Update in navigator)    Short term goals  Time Frame for Short term goals: STG=LTG :  Long term goals  Time Frame for Long term goals : 7-10 days or until d/c  Long term goal 1: Pt will complete feeding/grooming/oral care task c MOD I by d/c  Long term goal 2: Pt will complete total body bathing c MOD I by d/c  Long term goal 3: Pt will complete total body dressing (UB/LB/Footwear) c MOD I by d/c  Long term goal 4: Pt will complete toileting c MOD I by d/c  Long term goal 5: Pt will complete functional transfer (toilet, tub, shower) c MOD I by d/c. Long term goals 6: Pt will perform therex/therax to facilitate increased strength/endurance/ax tolerance (c emphasis on dynamic standing balance/tolerance >10 mins and BUE endurance) c MOD I in order to complete ADLs. :    Plan of Care:  Pt to be seen at least  5x per week for a minimum of 60 minutes as appropriate. Date  3/11/2021   TIMES IN/OUT   [id number]   Group Tx Minutes 60   TOTAL Tx time seen 60   Variance Time    Variance Reason    [] Refusal due to   [] Medical hold/reason  [] Illness   [] Off Unit for test/procedure  [] Extra time needed to complete task  [] Other (specify)   Restrictions/Precautions Restrictions/Precautions  Restrictions/Precautions: Fall Risk, Weight Bearing, ROM Restrictions     Position Activity Restriction  Hip Precautions: No hip flexion > 90 degrees, No hip internal rotation, No ADduction, Posterior hip precautions   Current Diet/Swallowing Issues DIET GENERAL;  Dietary Nutrition Supplements: Frozen Oral Supplement  Dietary Nutrition Supplements: Standard High Calorie Oral Supplement   Communication with other providers: [x] Ok to see per nursing at morning huddle  [] Medical hold and reason  [] Spoke with (team    member) regarding   Subjective observations: Pt agreeable to group session. Pain level/location:    Alarm placed/where?   Fall Risk  [] Pt taken to DR for lunch with nsg present   [] Pt taken back to room with chair/bed alarm in place         Group Activity:    Total time in group = 60min   Exercise / Lavelle Chi Group: Caprice Menon participated in exercise and discussion on benefits and precautions during exercise. This provided the opportunity to have fun, build camaraderie, increase endurance, improve breathing techniques, balance, and strength. Caprice Menon performed a variety of seated and standing activities as able, with focus on technique and safety during exercise. Also focused on warm-up, warm-down, endurance monitoring, strengthening & strategies, function, safety, and general social & communication opportunities with other group members.       Functional areas targeted:   [] Communication [] Memory  [] Coordination    [] Kitchen Safety [] Problem Solving  [x] Strengthening     [] Attention  [x] Endurance   [] Mobility    [] Awareness/Insight [] Balance  [] Transfers  [] Social/Pragmatics [] Sequencing  [] Equipment Use    [] Safety   [] Other (specify)     Participation:  [x] Actively participated    [] Required cues for    [] Other (specify)    Education completed: Energy conservation tips             Assessment / Impression                                                          Treatment/Activity Tolerance:   [x] Tolerated Treatment well:     [] Patient limited by fatigue/pain:       [] Patient limited by medical complications:    [] Adverse Reaction to Tx:   [] Significant change in status    Number of Minutes/Billable Intervention  Therapeutic Exercise    ADL Self-care    Neuro Re-Ed    Therapeutic Activity    Group 60   Other:    TOTAL 60     Electronically signed by   BERTRAND Combs   3/11/2021, 12:38 PM

## 2021-03-11 NOTE — PROGRESS NOTES
or touching assistance  Comment: Sup for shower transfer and vc to use grab bars for assit when in stance abd vc for use of LHS for distal B LE  CARE Score: 4  Discharge Goal: Independent    UB Dressing: Upper Body Dressing  Assistance Needed: Independent  Comment: X  CARE Score: 6  Discharge Goal: Independent         LB Dressing: Lower Body Dressing  Assistance Needed: Independent  Comment: MOd I using reacher to thread feet through pants whle following THP  CARE Score: 6  Discharge Goal: Independent    Donning and [employer] [place] [employer] Footwear: Putting On/Taking Off Footwear  Assistance Needed: Supervision or touching assistance  Comment: Mod I using sock aide while following THP/ SUp for using long handle shor horn for tennis shoes  CARE Score: 4  Discharge Goal: Independent      Toileting: Toileting Hygiene  Assistance Needed: Independent  Comment: X  CARE Score: 6  Discharge Goal: Independent      Toilet Transfers: Toilet Transfer  Assistance Needed: Supervision or touching assistance  Comment: Sup one cue for hand placement c standing  CARE Score: 4  Discharge Goal: Independent  Device Used:    []   Standard Toilet         [x]   Grab Bars           []  Bedside Commode       [x]   Elevated Toilet          []   Other:        Bed Mobility:           []   Pt received out of bed   Rolling R/L:    Scooting:  INd  Supine --> Sit:  Ind  Sit --> Supine:  Ind    Transfers:    Sit--> Stand:  Sup cues for hand placement   Stand --> Sit:   Mod I   Stand-Pivot:   Sup cues for hand placement   Other:    Assistive device required for transfer:   RW      Functional Mobility:  Throughout room/Bathroom during ADL   Assistance: Mod I   Device:   [x]   Rolling Walker     []   Standard Walker []   Wheelchair        []   [address]       []   4-Wheeled [employer] Sherif         []   Cardiac Malik Sherif       []   Other:        Homemaking Tasks:    Mod I patient retrieves clothing from suitcase on countertop and transports with RW to bathroom       Additional Therapeutic activities/exercises completed this date:     [x]   ADL Training   [x]   Balance/Postural training     [x]   Bed/Transfer Training   []   Endurance Training   []   Neuromuscular Re-ed   []   Nu-step:  Time:        Level:         #Steps:       []   Rebounder:    []  Seated     []  Standing        []   Supine Ther Ex (reps/sets):     []   Seated Ther Ex (reps/sets):     []   Standing Ther Ex (reps/sets):     []   Other:      Comments:      Patient/Caregiver Education and Training:   [x]   [employer] Equipment Use  [x]   Bed Mobility/Transfer Technique/Safety  [x]   Energy Conservation Tips  []   Family training  [x]   Postural Awareness  [x]   Safety During Functional Activities  [x]   Reinforced Patient's Precautions Patient able to recall all THP throughout ADL session including while getting dressed with AE    []   Progress was updated and reviewed in Rehabtracker with patient and/or family this         date. Treatment Plan for Next Session: POC to continue as tolerated; transfers with good hand and body placement within RW        Assessment: This pt demonstrated a positive response  to today's treatment as evidenced by increased safety awareness and independence with all ADL tasks . The patient is making good  progress toward established goals as evidenced by QI scores. Ongoing deficits are observed in the areas of hand placement during transfers  and continued focus on functional transfers from different surfaces of varying heights  is recommended.        Treatment/Activity Tolerance:   [x] Tolerated treatment with no adverse effects    [] Patient limited by fatigue  [] Patient limited by pain   [] Patient limited by medical complications:    [] Adverse reaction to Tx:   [] Significant change in status    Safety:       []  bed alarm set    [x]  chair alarm set    []  Pt refused alarms                []  Telesitter activated      [x]  Gait belt used during tx session      []other:       Number of feeding/grooming/oral care task c MOD I by d/c  Long term goal 2: Pt will complete total body bathing c MOD I by d/c  Long term goal 3: Pt will complete total body dressing (UB/LB/Footwear) c MOD I by d/c  Long term goal 4: Pt will complete toileting c MOD I by d/c  Long term goal 5: Pt will complete functional transfer (toilet, tub, shower) c MOD I by d/c. Long term goals 6: Pt will perform therex/therax to facilitate increased strength/endurance/ax tolerance (c emphasis on dynamic standing balance/tolerance >10 mins and BUE endurance) c MOD I in order to complete ADLs. :        Plan of Care                                                                              Times per week: 5 days per week for a minimum of 60 minutes/day plus group as appropriate for 60 minutes.   Treatment to include Plan  Times per day: Daily  Current Treatment Recommendations: Strengthening, Patient/Caregiver Education & Training, Equipment Evaluation, Education, & procurement, Self-Care / ADL, Pain Management, Balance Training, Home Management Training, Functional Mobility Training, Safety Education & Training, ROM    Electronically signed by   BERTRAND Harmon  3/11/2021, 9:30 AM

## 2021-03-11 NOTE — FLOWSHEET NOTE
[x] daily progress note       [] discharge       Patient Name:  Benitez Roberts   :  1923 MRN: [id number]  Room:  [address] [place] [address] Date of Admission: 2021  Rehabilitation Diagnosis:   Fracture of unspecified part of neck of right femur, initial encounter for closed fracture [S72.001A]  Closed displaced fracture of right femoral neck ([address] [S72.001A]       Date 3/11/2021       Day of ARU Week:  5   Time IN/OUT 5928-9611   Individual Tx Minutes 60   TOTAL Tx Time Mins 60   Restrictions Restrictions/Precautions  Restrictions/Precautions: Fall Risk, Weight Bearing, ROM Restrictions  Position Activity Restriction  Hip Precautions: No hip flexion > 90 degrees, No hip internal rotation, No ADduction, Posterior hip precautions   Communication with other providers: [x]   OK to see per nursing:     []   Spoke with team member regarding:      Subjective observations and cognitive status: Pt seen sitting up in recliner at beginning of treatment. Agreeable to therapy. Pain level/location: 0/10           Discharge recommendations  Anticipated discharge date:  2021  Destination: []? ???home alone   [x]? ? ??home alone with assist PRN     []???? home w/ family      []???? Continuous supervision  []????SNF    []???? Assisted living     []???? Other:   Continued therapy: [x]????[facility]  []????OUTPATIENT  PT   []???? No Further PT  Equipment needs: Becky L Shahab Saundersmasnadhya requires the assistance of a wheeled walker to successfully ambulate from room to room at home to allow completion of daily living tasks such as: bathing, toileting, dressing and grooming. A wheeled walker is necessary due to the patient's unsteady gait, upper body weakness, inability to  a standard walker. This patient can ambulate only by pushing a walker instead of using a lesser assistive device such as a cane or crutch. [x]   Pt received out of bed     Transfers:    Sit--> Stand:   Mod I   Stand --> Sit:   Mod I   Assistive device required for transfer:  RW    Gait:    Distance:  800'+400'  Assistance: Mod I   Device:  RW  Gait Quality:  Reciprocal pattern     Stairs   # Completed: 15  Assistance: Mod I   Supportive Device:  2 rails     Uneven Surfaces:       Assistance: Mod I   Device:    RW  Surfaces Completed:   [x]  Carpeted Surfaces      []  Throw rugs       []  Ramp       []  Outdoor pavements        []  Grass             []  Loose gravel        []  Other:      Pt amb over transitional surfaces mod I with RW. Pt amb up/down 6\" curb step mod I with RW. Additional Therapeutic activities/exercises completed this date:     []   Nu-step:  Time:        Level:         #Steps:       []   Rebounder:    []  Seated     []  Standing        []   Balance training         []   Postural training    []   Supine ther ex (reps/sets):     [x]   Seated ther ex (reps/sets): LAQs, marching, knee bends, ankle pumps x 20 reps each for strengthening. []   Standing ther ex (reps/sets):     []   Picking up object from floor (standing):                   []   Reacher used   []   Other:   []   Other:    Patient/Caregiver Education and Training:   [x]   Bed Mobility/Transfer technique/safety  [x]   Gait technique/sequencing  [x]   Proper use of assistive device  [x]   Advanced mobility safety and technique  [x]   Reinforced patient's precautions/mobility while maintaining precautions  []   Postural awareness  []   Family training  [x]   Progress was updated in Rehabtracker this date. Treatment Plan for Next Session: QI     Assessment: This pt demonstrated a positive response to today's treatment as evidenced by improved safety. The patient is making progress toward established goals as evidenced by QI scores.       Treatment/Activity Tolerance:   [x] Tolerated treatment with no adverse effects    [] Patient limited by fatigue  [] Patient limited by pain   [] Patient limited by medical complications:    [] Adverse reaction to Tx:   [] Significant change in

## 2021-03-12 PROCEDURE — 97530 THERAPEUTIC ACTIVITIES: CPT

## 2021-03-12 PROCEDURE — 97116 GAIT TRAINING THERAPY: CPT

## 2021-03-12 PROCEDURE — 6360000002 HC RX W HCPCS: Performed by: INTERNAL MEDICINE

## 2021-03-12 PROCEDURE — 94761 N-INVAS EAR/PLS OXIMETRY MLT: CPT

## 2021-03-12 PROCEDURE — 1280000000 HC REHAB R&B

## 2021-03-12 PROCEDURE — 97535 SELF CARE MNGMENT TRAINING: CPT

## 2021-03-12 PROCEDURE — 6370000000 HC RX 637 (ALT 250 FOR IP): Performed by: INTERNAL MEDICINE

## 2021-03-12 PROCEDURE — 6370000000 HC RX 637 (ALT 250 FOR IP): Performed by: PHYSICAL MEDICINE & REHABILITATION

## 2021-03-12 PROCEDURE — 97110 THERAPEUTIC EXERCISES: CPT

## 2021-03-12 PROCEDURE — 99232 SBSQ HOSP IP/OBS MODERATE 35: CPT | Performed by: PHYSICAL MEDICINE & REHABILITATION

## 2021-03-12 PROCEDURE — 94150 VITAL CAPACITY TEST: CPT

## 2021-03-12 RX ORDER — PSEUDOEPHEDRINE HCL 30 MG
100 TABLET ORAL DAILY
COMMUNITY
Start: 2021-03-13 | End: 2021-11-17

## 2021-03-12 RX ORDER — SENNA PLUS 8.6 MG/1
1 TABLET ORAL NIGHTLY
Qty: 30 TABLET | Refills: 0 | COMMUNITY
Start: 2021-03-12 | End: 2021-04-11

## 2021-03-12 RX ORDER — POLYETHYLENE GLYCOL 3350 17 G/17G
17 POWDER, FOR SOLUTION ORAL DAILY PRN
Status: DISCONTINUED | OUTPATIENT
Start: 2021-03-12 | End: 2021-03-13 | Stop reason: HOSPADM

## 2021-03-12 RX ORDER — ASPIRIN 81 MG/1
81 TABLET ORAL DAILY
Qty: 30 TABLET | Refills: 3 | COMMUNITY
Start: 2021-03-17

## 2021-03-12 RX ORDER — MULTIVITAMIN WITH IRON
1 TABLET ORAL DAILY
Refills: 0 | COMMUNITY
Start: 2021-03-12 | End: 2021-11-17

## 2021-03-12 RX ORDER — TRAMADOL HYDROCHLORIDE 50 MG/1
50 TABLET ORAL EVERY 6 HOURS PRN
Qty: 14 TABLET | Refills: 0 | Status: SHIPPED | OUTPATIENT
Start: 2021-03-12 | End: 2021-03-19

## 2021-03-12 RX ORDER — MULTIVITAMIN WITH IRON
1 TABLET ORAL DAILY
Status: DISCONTINUED | OUTPATIENT
Start: 2021-03-12 | End: 2021-03-13 | Stop reason: HOSPADM

## 2021-03-12 RX ADMIN — THERA TABS 1 TABLET: TAB at 14:02

## 2021-03-12 RX ADMIN — DOCUSATE SODIUM 100 MG: 100 CAPSULE, LIQUID FILLED ORAL at 08:25

## 2021-03-12 RX ADMIN — ENOXAPARIN SODIUM 30 MG: 30 INJECTION SUBCUTANEOUS at 08:25

## 2021-03-12 RX ADMIN — POLYETHYLENE GLYCOL (3350) 17 G: 17 POWDER, FOR SOLUTION ORAL at 08:24

## 2021-03-12 RX ADMIN — DILTIAZEM HYDROCHLORIDE 240 MG: 240 CAPSULE, COATED, EXTENDED RELEASE ORAL at 08:24

## 2021-03-12 RX ADMIN — TRAMADOL HYDROCHLORIDE 50 MG: 50 TABLET, FILM COATED ORAL at 06:29

## 2021-03-12 RX ADMIN — GABAPENTIN 100 MG: 100 CAPSULE ORAL at 08:24

## 2021-03-12 RX ADMIN — ASPIRIN 81 MG: 81 TABLET, COATED ORAL at 08:24

## 2021-03-12 RX ADMIN — CLONIDINE HYDROCHLORIDE 0.1 MG: 0.1 TABLET ORAL at 08:24

## 2021-03-12 RX ADMIN — LOSARTAN POTASSIUM 50 MG: 50 TABLET, FILM COATED ORAL at 08:24

## 2021-03-12 RX ADMIN — GABAPENTIN 100 MG: 100 CAPSULE ORAL at 21:45

## 2021-03-12 RX ADMIN — SENNOSIDES 8.6 MG: 8.6 TABLET, FILM COATED ORAL at 21:45

## 2021-03-12 RX ADMIN — CLONIDINE HYDROCHLORIDE 0.1 MG: 0.1 TABLET ORAL at 21:45

## 2021-03-12 ASSESSMENT — PAIN SCALES - GENERAL
PAINLEVEL_OUTOF10: 0
PAINLEVEL_OUTOF10: 0

## 2021-03-12 NOTE — PLAN OF CARE
Problem: Falls - Risk of:  Goal: Will remain free from falls  Description: Will remain free from falls  3/12/2021 0046 by Julius Cornejo RN  Outcome: Ongoing  3/11/2021 1259 by Soraida Ramirez RN  Outcome: Ongoing  Goal: Absence of physical injury  Description: Absence of physical injury  3/12/2021 0046 by Julius Cornejo RN  Outcome: Ongoing  3/11/2021 1259 by Soraida Ramirez RN  Outcome: Ongoing     Problem: Pain:  Goal: Pain level will decrease  Description: Pain level will decrease  3/12/2021 0046 by Julius Cornejo RN  Outcome: Ongoing  3/11/2021 1259 by Soraida Ramirez RN  Outcome: Ongoing  Goal: Control of acute pain  Description: Control of acute pain  3/12/2021 0046 by Julius Cornejo RN  Outcome: Ongoing  3/11/2021 1259 by Soraida Ramirez RN  Outcome: Ongoing  Goal: Control of chronic pain  Description: Control of chronic pain  3/12/2021 0046 by Julius Cornejo RN  Outcome: Ongoing  3/11/2021 1259 by Soraida Ramirez RN  Outcome: Ongoing  Goal: Patient's pain/discomfort is manageable  Description: Patient's pain/discomfort is manageable  3/12/2021 0046 by Julius Cornejo RN  Outcome: Ongoing  3/11/2021 1259 by Soraida Ramirez RN  Outcome: Ongoing     Problem: Infection:  Goal: Will remain free from infection  Description: Will remain free from infection  3/12/2021 0046 by Julius Cornejo RN  Outcome: Ongoing  3/11/2021 1259 by Soraida Ramirez RN  Outcome: Ongoing     Problem: Safety:  Goal: Free from accidental physical injury  Description: Free from accidental physical injury  3/12/2021 0046 by Julius Cornejo RN  Outcome: Ongoing  3/11/2021 1259 by Soraida Ramirez RN  Outcome: Ongoing  Goal: Free from intentional harm  Description: Free from intentional harm  3/12/2021 0046 by Julius Cornejo RN  Outcome: Ongoing  3/11/2021 1259 by Soraida Ramirez RN  Outcome: Ongoing     Problem: Daily Care:  Goal: Daily care needs are met  Description: Daily care needs are met  3/12/2021 0046 by Benton Chantal, RN  Outcome: Ongoing  3/11/2021 1259 by Marcello Apley, RN  Outcome: Ongoing     Problem: Skin Integrity:  Goal: Skin integrity will stabilize  Description: Skin integrity will stabilize  3/12/2021 0046 by Eun Cummins RN  Outcome: Ongoing  3/11/2021 1259 by Marcello Apley, RN  Outcome: Ongoing     Problem: Discharge Planning:  Goal: Patients continuum of care needs are met  Description: Patients continuum of care needs are met  3/12/2021 0046 by Eun Cummins RN  Outcome: Ongoing  3/11/2021 1259 by Marcello Apley, RN  Outcome: Ongoing

## 2021-03-12 NOTE — PROGRESS NOTES
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 No chills or cough. Prior (baseline) level of function: Independent. Current level of function:         Current  [facility] and Goals:   Occupational Therapy:    Short term goals  Time Frame for Short term goals: STG=LTG :   Long term goals  Time Frame for Long term goals : 7-10 days or until d/c  Long term goal 1: Pt will complete feeding/grooming/oral care task c MOD I by d/c  Long term goal 2: Pt will complete total body bathing c MOD I by d/c  Long term goal 3: Pt will complete total body dressing (UB/LB/Footwear) c MOD I by d/c  Long term goal 4: Pt will complete toileting c MOD I by d/c  Long term goal 5: Pt will complete functional transfer (toilet, tub, shower) c MOD I by d/c. Long term goals 6: Pt will perform therex/therax to facilitate increased strength/endurance/ax tolerance (c emphasis on dynamic standing balance/tolerance >10 mins and BUE endurance) c MOD I in order to complete ADLs.  :                                       Eating: Eating  Assistance Needed: Independent  Comment: X  CARE Score: 6  Discharge Goal: Independent       Oral Hygiene: Oral Hygiene  Assistance Needed: Independent  Comment: X  CARE Score: 6  Discharge Goal: Independent    UB/LB Bathing: Shower/Bathe Self  Assistance Needed: Supervision or touching assistance  Comment: Sup for shower transfer and vc to use grab bars for assit when in stance abd vc for use of LHS for distal B LE  CARE Score: 4  Discharge Goal: Independent    UB Dressing: Upper Body Dressing  Assistance Needed: Independent  Comment: X  CARE Score: 6  Discharge Goal: Independent         LB Dressing: Lower Body Dressing  Assistance Needed: Independent  Comment: MOd I using reacher to thread feet through pants whle following THP  CARE Score: 6  Discharge Goal: Independent    Donning and [place] Footwear: Putting On/Taking Off Footwear  Assistance Needed: Supervision or touching assistance  Comment: Mod I using sock aide while following THP/ SUp for using long handle shor horn for tennis shoes  CARE Score: 4  Discharge Goal: Independent      Toileting: Toileting Hygiene  Assistance Needed: Independent  Comment: X  CARE Score: 6  Discharge Goal: Independent      Toilet Transfers: Toilet Transfer  Assistance Needed: Supervision or touching assistance  Comment: Sup one cue for hand placement c standing  CARE Score: 4  Discharge Goal: Independent    Physical Therapy:   Short term goals  Time Frame for Short term goals: 10 tx days:  Short term goal 1: Pt will complete bed mobility tasks (rolling L/R, scooting, and sup<->sit) Ind following posterior hip precautions. Short term goal 2: Pt will complete OOB transfers using RW Mod Ind. Short term goal 3: Pt will ambulate 150 ft using RW over level surface and 10 ft over carpeted/transitional surface with SBA. Short term goal 4: Goal progressed 03/04/2021: Pt will ascend/descend curb step using RW with Sup. following appropriate step sequencing. Short term goal 5: Pt will complete object retrieval from the floor with 1UE support on RW and using reacher Mod Ind. Short term goal 6: Pt will propel manual w/c >/=150 ft Mod Ind. Short term goal 7: Goal added 03/04/2021: Pt will ascend/descend 4-5 steps using railings with Sup following appropriate step sequencing.             Bed Mobility:   Sit to Lying  Assistance Needed: Independent  Comment: in regular bed  CARE Score: 6  Discharge Goal: Independent  Roll Left and Right  Assistance Needed: Independent  Comment: in regular bed with pillow between legs for hip precautions. CARE Score: 6  Discharge Goal: Independent  Lying to Sitting on Side of Bed  Assistance Needed: Independent  Comment: in regular bed  CARE Score: 6  Discharge Goal: Independent    Transfers:    Sit to Stand  Assistance Needed: Supervision or touching assistance  Comment: Supervision; vcs for proper hand placement. CARE Score: 4  Discharge Goal: Independent  Chair/Bed-to-Chair Transfer  Assistance Needed: Supervision or touching assistance  Comment: Supervision; vcs for proper hand placement. CARE Score: 4  Discharge Goal: Independent  Toilet Transfer  Assistance Needed: Partial/moderate assistance(Min A using grab bars)  CARE Score: 3  Car Transfer  Assistance Needed: Independent  Comment: x  CARE Score: 6  Discharge Goal: Independent    Ambulation:    Walking Ability  Does the Patient Walk?: Yes     Walk 10 Feet  Assistance Needed: Independent  Comment: mod I with RW  CARE Score: 6  Discharge Goal: Supervision or touching assistance     Walk 50 Feet with Two Turns  Assistance Needed: Independent  Comment: mod I with RW  Reason if not Attempted: Not attempted due to medical condition or safety concerns(pt was only able to tolerate 18 ft today (limited by fatigue and dizziness))  CARE Score: 6  Discharge Goal: Supervision or touching assistance     Walk 150 Feet  Assistance Needed: Independent  Comment: mod I with RW  Reason if not Attempted: Not attempted due to medical condition or safety concerns  CARE Score: 6  Discharge Goal: Supervision or touching assistance     Walking 10 Feet on Uneven Surfaces  Assistance Needed: Independent  Comment: mod I with RW  CARE Score: 6  Discharge Goal: Supervision or touching assistance     1 Step (Curb)  Assistance Needed: Supervision or touching assistance  Comment: supervision with RW. Vcs for walker safety.   Reason if not Attempted: Not attempted due to medical condition or safety concerns  CARE Score: 4  Discharge Goal: Supervision or touching assistance     4 Steps  Assistance Needed: Independent  Comment: Mod I with 2 rails  Reason if not Attempted: Not applicable  CARE Score: 6  Discharge Goal: Not Applicable     12 Steps  Assistance Needed: Independent  Comment: mod I with 2 rails  Reason if not Attempted: Not applicable  CARE Score: 6  Discharge Goal: Not Applicable       Wheelchair:  w/c Ability: Wheelchair Ability  Uses a Wheelchair and/or Scooter?: No(pt ambulating with RW at this time.)  Wheel 50 Feet with Two Turns  Assistance Needed: Supervision or touching assistance(SBA-Sup)  CARE Score: 4  Discharge Goal: Independent  Wheel 150 Feet  Assistance Needed: Partial/moderate assistance(pt was only able to propel up to 75 ft with SBA-Sup; activity tolerance was limited by fatigue and worsening lethargy)  CARE Score: 3  Discharge Goal: Independent          Balance:        Object: Picking Up Object  Assistance Needed: Independent  Comment: mod I with reacher  CARE Score: 6  Discharge Goal: Independent    I      Exam:    Blood pressure 136/63, pulse 67, temperature 98 °F (36.7 °C), temperature source Oral, resp. rate 16, height 5' 2.99\" (1.6 m), weight 100 lb 8 oz (45.6 kg), SpO2 95 %. General: Observed standing up in a walker. In good spirits. Easily distracted. HEENT: Clear speech. MMM. Neck supple. Pulmonary: Unlabored breathing. No rales or coughing noted. Cardiac: Regular rate and rhythm. Abdomen: Patient's abdomen is soft and nondistended. Bowel sounds were present throughout. There was no rebound, guarding or masses noted. Upper extremities: Able to manage her walker confidently. Lower extremities: Less swelling. Staples approximate the skin edges well. No signs of DVT. Sitting balance was good.   Standing balance was fair-.    Lab Results   Component Value Date    WBC 7.2 03/03/2021    HGB 9.8 (L) 03/03/2021    HCT 32.1 (L) 03/03/2021    MCV 97.0 03/03/2021     03/03/2021     Lab Results   Component Value Date    INR 0.91 02/22/2021    PROTIME 11.0 (L) 02/22/2021     Lab Results   Component Value Date    CREATININE 1.1 03/03/2021    BUN 26 (H) 03/03/2021     03/03/2021    K 4.7 03/03/2021     03/03/2021    CO2 31 03/03/2021     Lab Results   Component Value Date     (H) 02/23/2021     (H) 02/23/2021    ALKPHOS 135 (H) 02/23/2021    BILITOT 1.2 (H) 02/23/2021       Expected length of stay  prior to a supervised level of function for discharge home with a walker and [facility] OT/PT is 3/13/2021. Recommendations:    1. Right femoral neck fracture with hemiarthroplasty: She has clearly benefited from participating in her daily occupational and physical therapy. Improved activity tolerance and better safety with adaptive equipment using her hip precautions. She has benefited from the pulmonary hygiene, DVT prophylaxis and cautious pain management.  Consistently continent of bowel and bladder.     More consistent with her hip precautions.  Adaptive equipment education.  Verbal cues and standby assistance for transfers today.  She will need to use a walker and wheelchair after discharge. 2. DVT prophylaxis: Lovenox 30 mg subcu daily for a total of 21 days per her surgeon. mUair Ramos must monitor her hemoglobin and platelet count periodically while on this medication.  Weightbearing activities are steadily improving now.  GI prophylaxis offered.  No clinical signs of blood loss. Will check labs before discharge. 3. Uncontrolled hypertension: Much less dizziness now on her current regimen of Cardizem, Catapres and Cozaar.  Target systolic blood pressure is 120-140.  Vital signs are checked at rest and with activity.  Blood pressure more consistent today       4. Obstipation: Working to stay ahead of her constipation with oral meds now.    Positive flatus.   5. Lumbar DDD with sciatica: Diathermy, therapeutic exercises and careful adjustments of gait training to accommodate her foot drop.  Her chronic leg pain is a little less troublesome during therapy recently. .  6. Nausea with emesis: Zofran is used infrequently now.  Regular bowel intervention continues.    Symptoms are not limiting her oral intake.        Counseling and Coordination of Care: In care conference today I met with the patient's OT, PT, RN and . We discussed the patient's problems, progress and prognosis. Disposition issues were clarified and plans were established for ongoing rehabilitation efforts beyond the ARU stay. I reviewed this information with the patient during a second distinct visit with the patient. More than half of the total time of 32 minutes spent with the patient involved counseling and coordination of care.

## 2021-03-12 NOTE — PROGRESS NOTES
Physical Rehabilitation: OCCUPATIONAL THERAPY     [x] daily progress note       [x] discharge       Patient Name:  Lauro Whitlock   :  1923 MRN: [id number]  Room:  [address] [place] [address] Date of Admission: 2021  Rehabilitation Diagnosis:   Fracture of unspecified part of neck of right femur, initial encounter for closed fracture [S72.001A]  Closed displaced fracture of right femoral neck ([address] [S72.001A]       Date 3/12/2021       Day of ARU Week:  6   Time IN//845; 1250/1350   Individual Tx Minutes 60 + 60   Group Tx Minutes    Co-Treat Minutes    Concurrent Tx Minutes    TOTAL Tx Time Mins 120   Variance Time    Variance Time []   Refusal due to:     []   Medical hold/reason:    []   Illness   []   Off Unit for test/procedure  []   Extra time needed to complete task  []   Therapeutic need  []   Other (specify):   Restrictions Restrictions/Precautions: Fall Risk, Weight Bearing, ROM Restrictions     Hip Precautions: No hip flexion > 90 degrees, No hip internal rotation, No ADduction, Posterior hip precautions   Communication with other providers: [x]   OK to see per nursing:     []   Spoke with team member regarding:      Subjective observations and cognitive status: Patient semi fowlers pleasant and agreeable to therapy      Pain level/location:    /10       Location: no pain noted per patient    Discharge recommendations  Anticipated discharge date:  3/13  Destination: []home alone   []home alone w assist prn   [] home w/ family    [] Continuous supervision       []SNF    [] Assisted living     [x] Other: ILF   Continued therapy: [x]HHC OT  []OUTPATIENT  OT   [] No Further OT  Equipment needs: None       ADLs:    Eating: Eating  Assistance Needed: Independent  Comment: X  CARE Score: 6  Discharge Goal: Independent       Oral Hygiene: Oral Hygiene  Assistance Needed: Independent  Comment: X  CARE Score: 6  Discharge Goal: Independent    UB/LB Bathing: Shower/Bathe Self  Assistance Needed: While standing for 10 minutes with seated rest break at 5 min sarah to increase strength and endurance for facilitation of community re entry mobility tasks and ADL/IADL's    []   Neuromuscular Re-ed   []   Nu-step:  Time:        Level:         #Steps:       []   Rebounder:    []  Seated     []  Standing        []   Supine Ther Ex (reps/sets):     [x]   Seated Ther Ex (reps/sets):  Patient instructed in B UE exercises with 2# dowel and orange thera band all planes and all joints    []   Standing Ther Ex (reps/sets):     [x]   Other: HEP program handouts given and gone through; home and community fall precautions and safety handout gven and gone through        Comments:      Patient/Caregiver Education and Training:   [x]   [employer] Equipment Use  [x]   Bed Mobility/Transfer Technique/Safety  [x]   Energy Conservation Tips  []   Family training  [x]   Postural Awareness  [x]   Safety During Functional Activities  [x]   Reinforced Patient's Precautions  Patient able to recall % of therapy treatment this date  []   Progress was updated and reviewed in Rehabtracker with patient and/or family this         date. Treatment Plan for Next Session: patient to [place] 3/13      Assessment: This pt demonstrated a positive   response to today's treatment as evidenced by increasing independence in all areas of ADL . The patient is making good  progress toward established goals as evidenced by QI scores.          Treatment/Activity Tolerance:   [x] Tolerated treatment with no adverse effects    [] Patient limited by fatigue  [] Patient limited by pain   [] Patient limited by medical complications:    [] Adverse reaction to Tx:   [] Significant change in status    Safety:       []  bed alarm set    []  chair alarm set    []  Pt refused alarms                []  Telesitter activated      [x]  Gait belt used during tx session      []other:       Number of Minutes/Billable Intervention  Therapeutic Exercise 30   ADL Self-care 45 Neuro Re-Ed    Therapeutic Activity 15 + 30   Group    Other:    TOTAL 120       Social History  Social/Functional History  Lives With: Alone  Type of Home: (Pt reports living at [facility])  Home Layout: One level  Home Access: Stairs to enter without rails  Entrance Stairs - Number of Steps: threshold step at the door (garage entrance)  Bathroom Shower/Tub: Tub/Shower unit, Walk-in shower, Shower chair with back(soaked in tub at baseline)  Bathroom Toilet: Standard  Bathroom Equipment: Toilet raiser  Bathroom Accessibility: (2nd bathroom c walk-in shower is w/c/fww. However pt reports use of tu/shower bathroom and is not w/c and walker accessible.)  Home Equipment: Cane  ADL Assistance: Independent  Homemaking Responsibilities: Yes  Meal Prep Responsibility: Primary  Laundry Responsibility: Primary  Cleaning Responsibility: Primary  Bill Paying/Finance Responsibility: Primary(paige De Anda)  is POA)  Shopping Responsibility: Primary  Dependent Care Responsibility: No  Health Care Management: Primary  Ambulation Assistance: Independent  Transfer Assistance: Independent  Active : No  Patient's  Info: Pt uses [employer]  Mode of Transportation: Bus  Occupation: Retired  Leisure & Hobbies: Pt reports being very busy however enjoys painting: water color and oil painting. Pt also enjoys reading. Pt also reports enjoying walks however d/t balance concerns pt has stopped.   Additional Comments: sleeps in regular,  flat bed; ~3 falls in the past year with recent fall requiring hospitalization due to injury    Objective                                                                                    Goals:  (Update in navigator)  Short term goals  Time Frame for Short term goals: STG=LTG:  Long term goals  Time Frame for Long term goals : 7-10 days or until d/c  Long term goal 1: Pt will complete feeding/grooming/oral care task c MOD I by d/c  Long term goal 2: Pt will

## 2021-03-12 NOTE — FLOWSHEET NOTE
[x] daily progress note       [x] discharge       Patient Name:  Yeison Joy   :  1923 MRN: [id number]  Room:  [address] [place] [address] Date of Admission: 2021  Rehabilitation Diagnosis:   Fracture of unspecified part of neck of right femur, initial encounter for closed fracture [S72.001A]  Closed displaced fracture of right femoral neck ([address] [S72.001A]       Date 3/12/2021       Day of ARU Week:  6   Time IN/OUT 8340-4685   Individual Tx Minutes 60   TOTAL Tx Time Mins 60   Restrictions Restrictions/Precautions  Restrictions/Precautions: Fall Risk, Weight Bearing, ROM Restrictions  Position Activity Restriction  Hip Precautions: No hip flexion > 90 degrees, No hip internal rotation, No ADduction, Posterior hip precautions   Communication with other providers: [x]   OK to see per nursing:     []   Spoke with team member regarding:      Subjective observations and cognitive status: Pt seen in semi-link's position in bed at beginning of treatment. Agreeable to therapy. Pain level/location: 0/10          Discharge recommendations  Anticipated discharge date:  2021  Destination: []?????home alone   [x]?????home alone with assist PRN     []????? home w/ family      []????? Continuous supervision  []??? ??SNF    []????? Assisted living     []????? Other:   Continued therapy: [x]??? ? ?[facility]  []?????OUTPATIENT  PT   []????? No Further PT  Equipment needs: Beckyskyler Alvarez requires the assistance of a wheeled walker to successfully ambulate from room to room at home to allow completion of daily living tasks such as: bathing, toileting, dressing and grooming.  A wheeled walker is necessary due to the patient's unsteady gait, upper body weakness, inability to  a standard walker.  This patient can ambulate only by pushing a walker instead of using a lesser assistive device such as a cane or crutch.       Bed Mobility:            Rolling R/L:  Mod I with pillow between LEs to abide by hip precautions Scooting:  Independent   Lying --> Sit:  Independent   Sit --> lying:  Independent     Transfers:    Sit--> Stand: Mod I   Stand --> Sit:   Mod I   Chair-->Bed/Bed --> Chair:   Mod I   Car Transfers: Mod I   Assistive device required for transfer:  RW    Gait:    Walk 10 feet: Mod I   Walk 50 feet with 2 turns: Mod I   Walk 150 feet: Mod I   Other distance: 500'  Device:  RW  Gait Quality:  Reciprocal pattern     Stairs   Curb: mod I   Supportive Device: RW  Height: 6\"  4 steps: Mod I   12 steps: Mod I   Supportive Device: 2 rails     Uneven Surfaces:       Assistance: Mod I   Device:   RW  Surfaces Completed:   [x] Carpeted Surface    []  Throw rugs       []  Ramp       []  Outdoor pavements        []  Grass             []  Loose gravel        []  Other:       Picking Up Object From Floor (must be standing position):  Assistance: mod I    [x]   Reacher used    Additional Therapeutic activities/exercises completed this date:     []   Nu-step:  Time:        Level:         #Steps:       []   Rebounder:    []  Seated     []  Standing        []   Balance training         []   Postural training    [x]   Supine ther ex (reps/sets): ankle pumps, quad sets, buttock squeezes, hip abduction x 20 reps each for strengthening. [x]   Seated ther ex (reps/sets): LAQs, marching, knee bends x 20 reps each for strengthening. []   Standing ther ex (reps/sets):     []   Other:   []   Other:   []   Other:    Patient/Caregiver Education and Training:   [x]   Bed Mobility/Transfer technique/safety  [x]   Gait technique/sequencing  [x]   Proper use of assistive device  [x]   Advanced mobility safety and technique  [x]   Reinforced patient's precautions/mobility while maintaining precautions  []   Postural awareness  []   Family training  [x]   Progress was updated in Rehab[facility]er this date.     Treatment Plan for Next Session: pt to discharge from [facility] tomorrow (3[facility]       Assessment:    Treatment/Activity Tolerance:   [x] Tolerated treatment with no adverse effects    [] Patient limited by fatigue  [] Patient limited by pain   [] Patient limited by medical complications:    [] Adverse reaction to Tx:   [] Significant change in status    Safety:       [x]  bed alarm set    []  chair alarm set    []  Pt refused alarms                []  Telesitter activated      [x]  Gait belt used during tx session      [x]other: pt left in semi-link's position in bed with call light at end of treatment. Number of Minutes/Billable Intervention  Gait Training 30   Therapeutic Exercise 15   Neuro Re-Ed    Therapeutic Activity 15   Wheelchair Propulsion    Group    Other:    TOTAL 60         Social History  Social/Functional History  Lives With: Alone  Type of Home: (Pt reports living at [facility])  Home Layout: One level  Home Access: Stairs to enter without rails  Entrance Stairs - Number of Steps: threshold step at the door (garage entrance)  Bathroom Shower/Tub: Tub/Shower unit, Walk-in shower, Shower chair with back(soaked in tub at baseline)  [address]: [address] [place] [address] [address]: Toilet raiser  Bathroom Accessibility: (2nd bathroom c walk-in shower is w/c/fww. However pt reports use of tu/shower bathroom and is not w/c and walker accessible.)  Home Equipment: Cane  ADL Assistance: Independent  Homemaking Responsibilities: Yes  Meal Prep Responsibility: Primary  Laundry Responsibility: Primary  Cleaning Responsibility: Primary  Bill Paying/Finance Responsibility: Primary(nimichela Van Payer)  is POA)  Shopping Responsibility: Primary  Dependent Care Responsibility: No  Health Care Management: Primary  Ambulation Assistance: Independent  Transfer Assistance: Independent  Active : No  Patient's  Info: Pt uses [email or web address]  Mode of Transportation: Bus  Occupation: Retired  Leisure & Hobbies: Pt reports being very busy however enjoys painting: water color and oil painting.  Pt also enjoys reading. Pt also reports enjoying walks however d/t balance concerns pt has stopped. Additional Comments: sleeps in regular,  flat bed; ~3 falls in the past year with recent fall requiring hospitalization due to injury    Objective                                                                                    Goals:  (Update in navigator)  Short term goals  Time Frame for Short term goals: 10 tx days:  Short term goal 1: Pt will complete bed mobility tasks (rolling L/R, scooting, and sup<->sit) Ind following posterior hip precautions. Short term goal 2: Pt will complete OOB transfers using RW Mod Ind. Short term goal 3: Pt will ambulate 150 ft using RW over level surface and 10 ft over carpeted/transitional surface with SBA. Short term goal 4: Goal progressed 03/04/2021: Pt will ascend/descend curb step using RW with Sup. following appropriate step sequencing. Short term goal 5: Pt will complete object retrieval from the floor with 1UE support on RW and using reacher Mod Ind. Short term goal 6: Pt will propel manual w/c >/=150 ft Mod Ind. Short term goal 7: Goal added 03/04/2021: Pt will ascend/descend 4-5 steps using railings with Sup following appropriate step sequencing.:   :        Plan of Care                                                                              Times per week: 5 days per week for a minimum of 60 minutes/day plus group as appropriate for 60 minutes.   Treatment to include Current Treatment Recommendations: Strengthening, Gait Training, Patient/Caregiver Education & Training, ROM, Equipment Evaluation, Education, & procurement, Balance Training, Pain Management, Functional Mobility Training, Endurance Training, Home Exercise Program, Positioning, Transfer Training, Safety Education & Training, Wheelchair Mobility Training    Electronically signed by   Licha Zhang, [id number]  3/12/2021, 2:21 PM

## 2021-03-13 VITALS
DIASTOLIC BLOOD PRESSURE: 69 MMHG | SYSTOLIC BLOOD PRESSURE: 150 MMHG | RESPIRATION RATE: 16 BRPM | BODY MASS INDEX: 18.21 KG/M2 | OXYGEN SATURATION: 94 % | HEART RATE: 68 BPM | WEIGHT: 102.8 LBS | TEMPERATURE: 98 F | HEIGHT: 63 IN

## 2021-03-13 LAB
ANION GAP SERPL CALCULATED.3IONS-SCNC: 5 MMOL/L (ref 4–16)
BUN BLDV-MCNC: 28 MG/DL (ref 6–23)
CALCIUM SERPL-MCNC: 8.3 MG/DL (ref 8.3–10.6)
CHLORIDE BLD-SCNC: 105 MMOL/L (ref 99–110)
CO2: 29 MMOL/L (ref 21–32)
CREAT SERPL-MCNC: 1 MG/DL (ref 0.6–1.1)
GFR AFRICAN AMERICAN: >60 ML/MIN/1.73M2
GFR NON-AFRICAN AMERICAN: 51 ML/MIN/1.73M2
GLUCOSE BLD-MCNC: 97 MG/DL (ref 70–99)
HCT VFR BLD CALC: 31.2 % (ref 37–47)
HEMOGLOBIN: 9.4 GM/DL (ref 12.5–16)
MCH RBC QN AUTO: 30 PG (ref 27–31)
MCHC RBC AUTO-ENTMCNC: 30.1 % (ref 32–36)
MCV RBC AUTO: 99.7 FL (ref 78–100)
PDW BLD-RTO: 16.3 % (ref 11.7–14.9)
PLATELET # BLD: 288 K/CU MM (ref 140–440)
PMV BLD AUTO: 10 FL (ref 7.5–11.1)
POTASSIUM SERPL-SCNC: 4.6 MMOL/L (ref 3.5–5.1)
RBC # BLD: 3.13 M/CU MM (ref 4.2–5.4)
SODIUM BLD-SCNC: 139 MMOL/L (ref 135–145)
WBC # BLD: 6.4 K/CU MM (ref 4–10.5)

## 2021-03-13 PROCEDURE — 94761 N-INVAS EAR/PLS OXIMETRY MLT: CPT

## 2021-03-13 PROCEDURE — 6360000002 HC RX W HCPCS: Performed by: INTERNAL MEDICINE

## 2021-03-13 PROCEDURE — 6370000000 HC RX 637 (ALT 250 FOR IP): Performed by: INTERNAL MEDICINE

## 2021-03-13 PROCEDURE — 6370000000 HC RX 637 (ALT 250 FOR IP): Performed by: PHYSICAL MEDICINE & REHABILITATION

## 2021-03-13 PROCEDURE — 94150 VITAL CAPACITY TEST: CPT

## 2021-03-13 PROCEDURE — 80048 BASIC METABOLIC PNL TOTAL CA: CPT

## 2021-03-13 PROCEDURE — 99239 HOSP IP/OBS DSCHRG MGMT >30: CPT | Performed by: PHYSICAL MEDICINE & REHABILITATION

## 2021-03-13 PROCEDURE — 36415 COLL VENOUS BLD VENIPUNCTURE: CPT

## 2021-03-13 PROCEDURE — 85027 COMPLETE CBC AUTOMATED: CPT

## 2021-03-13 RX ADMIN — ENOXAPARIN SODIUM 30 MG: 30 INJECTION SUBCUTANEOUS at 08:47

## 2021-03-13 RX ADMIN — CLONIDINE HYDROCHLORIDE 0.1 MG: 0.1 TABLET ORAL at 08:47

## 2021-03-13 RX ADMIN — DOCUSATE SODIUM 100 MG: 100 CAPSULE, LIQUID FILLED ORAL at 08:47

## 2021-03-13 RX ADMIN — ASPIRIN 81 MG: 81 TABLET, COATED ORAL at 08:47

## 2021-03-13 RX ADMIN — LOSARTAN POTASSIUM 50 MG: 50 TABLET, FILM COATED ORAL at 08:47

## 2021-03-13 RX ADMIN — THERA TABS 1 TABLET: TAB at 08:47

## 2021-03-13 RX ADMIN — GABAPENTIN 100 MG: 100 CAPSULE ORAL at 08:47

## 2021-03-13 RX ADMIN — DILTIAZEM HYDROCHLORIDE 240 MG: 240 CAPSULE, COATED, EXTENDED RELEASE ORAL at 08:47

## 2021-03-13 ASSESSMENT — PAIN DESCRIPTION - PROGRESSION: CLINICAL_PROGRESSION: NOT CHANGED

## 2021-03-13 ASSESSMENT — PAIN SCALES - GENERAL: PAINLEVEL_OUTOF10: 0

## 2021-03-13 NOTE — CARE COORDINATION
Case mgt met with patient in room. Patient satisfied with tomorrow's discharge plan. Reviewed [place] [id number] referral with likely Monday start. Patient's RW in room. Chase Steward will provide transport. VM left for PCP requesting followup visit. Updated [facility] order to included RN faxed to [facility]. ARU  Discharge Summary    D/C Date: 3/13/21    Patient discharged to:   [facility]    Transported by:   family    Referrals made to:    [place] [id number], [facility]    Additional information:   Ortho followup scheduled

## 2021-03-13 NOTE — PLAN OF CARE
Problem: Falls - Risk of:  Goal: Will remain free from falls  Description: Will remain free from falls  Outcome: Ongoing  Goal: Absence of physical injury  Description: Absence of physical injury  Outcome: Ongoing     Problem: Pain:  Goal: Pain level will decrease  Description: Pain level will decrease  Outcome: Ongoing  Goal: Control of acute pain  Description: Control of acute pain  Outcome: Ongoing  Goal: Control of chronic pain  Description: Control of chronic pain  Outcome: Ongoing  Goal: Patient's pain/discomfort is manageable  Description: Patient's pain/discomfort is manageable  Outcome: Ongoing     Problem: Infection:  Goal: Will remain free from infection  Description: Will remain free from infection  Outcome: Ongoing     Problem: Safety:  Goal: Free from accidental physical injury  Description: Free from accidental physical injury  Outcome: Ongoing  Goal: Free from intentional harm  Description: Free from intentional harm  Outcome: Ongoing     Problem: Daily Care:  Goal: Daily care needs are met  Description: Daily care needs are met  Outcome: Ongoing     Problem: Skin Integrity:  Goal: Skin integrity will stabilize  Description: Skin integrity will stabilize  Outcome: Ongoing     Problem: Discharge Planning:  Goal: Patients continuum of care needs are met  Description: Patients continuum of care needs are met  Outcome: Ongoing     Problem: Skin Integrity:  Goal: Will show no infection signs and symptoms  Description: Will show no infection signs and symptoms  Outcome: Ongoing  Goal: Absence of new skin breakdown  Description: Absence of new skin breakdown  Outcome: Ongoing

## 2021-03-13 NOTE — PROGRESS NOTES
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 A little anxious but hopeful. Prior (baseline) level of function: Independent. Current level of function:         Current  IRF-MUSTAPHA and Goals:   Occupational Therapy:    Short term goals  Time Frame for Short term goals: STG=LTG :   Long term goals  Time Frame for Long term goals : 7-10 days or until d/c  Long term goal 1: Pt will complete feeding/grooming/oral care task c MOD I by d/c  Long term goal 2: Pt will complete total body bathing c MOD I by d/c  Long term goal 3: Pt will complete total body dressing (UB/LB/Footwear) c MOD I by d/c  Long term goal 4: Pt will complete toileting c MOD I by d/c  Long term goal 5: Pt will complete functional transfer (toilet, tub, shower) c MOD I by d/c. Long term goals 6: Pt will perform therex/therax to facilitate increased strength/endurance/ax tolerance (c emphasis on dynamic standing balance/tolerance >10 mins and BUE endurance) c MOD I in order to complete ADLs.  :                                       Eating: Eating  Assistance Needed: Independent  Comment: X  CARE Score: 6  Discharge Goal: Independent       Oral Hygiene: Oral Hygiene  Assistance Needed: Independent  Comment: X  CARE Score: 6  Discharge Goal: Independent    UB/LB Bathing: Shower/Bathe Self  Assistance Needed: Independent  Comment: X  CARE Score: 6  Discharge Goal: Independent    UB Dressing: Upper Body Dressing  Assistance Needed: Independent  Comment: X  CARE Score: 6  Discharge Goal: Independent         LB Dressing: Lower Body Dressing  Assistance Needed: Independent  Comment: Mod I with reacher  CARE Score: 6  Discharge Goal: Independent    Donning and Hart Footwear: Putting On/Taking Off Footwear  Assistance Needed: Independent  Comment: Mod I using long handle shoe horn and soxck aide to maximino socks and tennis shoes  CARE Score: 6  Discharge Goal: Independent      Toileting: Toileting Hygiene  Assistance Needed: Independent  Comment: X  CARE Score: 6  Discharge Goal: Independent      Toilet Transfers: Toilet Transfer  Assistance Needed: Independent  Comment: Mod I  CARE Score: 6  Discharge Goal: Independent    Physical Therapy:   Short term goals  Time Frame for Short term goals: 10 tx days:  Short term goal 1: Pt will complete bed mobility tasks (rolling L/R, scooting, and sup<->sit) Ind following posterior hip precautions. Short term goal 2: Pt will complete OOB transfers using RW Mod Ind. Short term goal 3: Pt will ambulate 150 ft using RW over level surface and 10 ft over carpeted/transitional surface with SBA. Short term goal 4: Goal progressed 03/04/2021: Pt will ascend/descend curb step using RW with Sup. following appropriate step sequencing. Short term goal 5: Pt will complete object retrieval from the floor with 1UE support on RW and using reacher Mod Ind. Short term goal 6: Pt will propel manual w/c >/=150 ft Mod Ind. Short term goal 7: Goal added 03/04/2021: Pt will ascend/descend 4-5 steps using railings with Sup following appropriate step sequencing. Bed Mobility:   Sit to Lying  Assistance Needed: Independent  Comment: in regular bed  CARE Score: 6  Discharge Goal: Independent  Roll Left and Right  Assistance Needed: Independent  Comment: in regular bed with pillow between legs for hip precautions.   CARE Score: 6  Discharge Goal: Independent  Lying to Sitting on Side of Bed  Assistance Needed: Independent  Comment: in regular bed  CARE Score: 6  Discharge Goal: Independent    Transfers: Sit to Stand  Assistance Needed: Independent  Comment: *  CARE Score: 6  Discharge Goal: Independent  Chair/Bed-to-Chair Transfer  Assistance Needed: Independent  Comment: *  CARE Score: 6  Discharge Goal: Independent  Toilet Transfer  Assistance Needed: Partial/moderate assistance(Min A using grab bars)  CARE Score: 3  Car Transfer  Assistance Needed: Independent  Comment: *  CARE Score: 6  Discharge Goal: Independent    Ambulation:    Walking Ability  Does the Patient Walk?: Yes     Walk 10 Feet  Assistance Needed: Independent  Comment: mod I with RW  CARE Score: 6  Discharge Goal: Supervision or touching assistance     Walk 50 Feet with Two Turns  Assistance Needed: Independent  Comment: mod I with RW  Reason if not Attempted: Not attempted due to medical condition or safety concerns(pt was only able to tolerate 18 ft today (limited by fatigue and dizziness))  CARE Score: 6  Discharge Goal: Supervision or touching assistance     Walk 150 Feet  Assistance Needed: Independent  Comment: mod I with RW  Reason if not Attempted: Not attempted due to medical condition or safety concerns  CARE Score: 6  Discharge Goal: Supervision or touching assistance     Walking 10 Feet on Uneven Surfaces  Assistance Needed: Independent  Comment: mod I with RW  CARE Score: 6  Discharge Goal: Supervision or touching assistance     1 Step (Curb)  Assistance Needed: Independent  Comment: mod I with RW  Reason if not Attempted: Not attempted due to medical condition or safety concerns  CARE Score: 6  Discharge Goal: Supervision or touching assistance     4 Steps  Assistance Needed: Independent  Comment:  Mod I with 2 rails  Reason if not Attempted: Not applicable  CARE Score: 6  Discharge Goal: Not Applicable     12 Steps  Assistance Needed: Independent  Comment: mod I with 2 rails  Reason if not Attempted: Not applicable  CARE Score: 6  Discharge Goal: Not Applicable       Wheelchair:  w/c Ability: Wheelchair Ability  Uses a Wheelchair and/or Scooter?: No  Wheel 50 Feet with Two Turns  Assistance Needed: Supervision or touching assistance(SBA-Sup)  CARE Score: 4  Discharge Goal: Independent  Wheel 150 Feet  Assistance Needed: Partial/moderate assistance(pt was only able to propel up to 75 ft with SBA-Sup; activity tolerance was limited by fatigue and worsening lethargy)  CARE Score: 3  Discharge Goal: Independent          Balance:        Object: Picking Up Object  Assistance Needed: Independent  Comment: mod I with reacher  CARE Score: 6  Discharge Goal: Independent    I      Exam:    Blood pressure (!) 144/63, pulse 74, temperature 98 °F (36.7 °C), temperature source Oral, resp. rate 16, height 5' 2.99\" (1.6 m), weight 102 lb 6.4 oz (46.4 kg), SpO2 99 %. General: Up in a wheelchair. Asking insightful questions. Poor hearing as before. HEENT: MMM. Neck supple. No JVD. Pulmonary: Clear and unlabored. Cardiac: Regular rate and rhythm. Abdomen: Patient's abdomen is soft and nondistended. Bowel sounds were present throughout. There was no rebound, guarding or masses noted. Upper extremities: No new bruising or swelling. Lower extremities: Staples approximate the skin edges well. Much less swelling about the thigh. No signs of DVT. Sitting balance was good.   Standing balance was fair-.    Lab Results   Component Value Date    WBC 7.2 03/03/2021    HGB 9.8 (L) 03/03/2021    HCT 32.1 (L) 03/03/2021    MCV 97.0 03/03/2021     03/03/2021     Lab Results   Component Value Date    INR 0.91 02/22/2021    PROTIME 11.0 (L) 02/22/2021     Lab Results   Component Value Date    CREATININE 1.1 03/03/2021    BUN 26 (H) 03/03/2021     03/03/2021    K 4.7 03/03/2021     03/03/2021    CO2 31 03/03/2021     Lab Results   Component Value Date     (H) 02/23/2021     (H) 02/23/2021    ALKPHOS 135 (H) 02/23/2021    BILITOT 1.2 (H) 02/23/2021       Expected length of stay  prior to a supervised level of function for discharge home with a walker and [facility] OT/PT is 3/13/2021. Recommendations:    1. Right femoral neck fracture with hemiarthroplasty: She has clearly benefited from participating in her daily occupational and physical therapy. Improved activity tolerance and better safety with adaptive equipment using her hip precautions. She has benefited from the pulmonary hygiene, DVT prophylaxis and cautious pain management.  Consistently continent of bowel and bladder.     More consistent with her hip precautions.  Adaptive equipment education.  Verbal cues and standby assistance for transfers today.  She will need to use a walker and wheelchair after discharge. Home with home health care tomorrow. 2. DVT prophylaxis: Lovenox 30 mg subcu daily for a total of 21 days per her surgeon (4 doses to be given after discharge). Then the surgeon wants a baby aspirin daily which was her prior regimen anyway. Checking labs tomorrow. Weightbearing has steadily improved recently. No new bruising or swelling. 3. Uncontrolled hypertension: Generally not reporting of dizziness on her current regimen of Cardizem, Catapres and Cozaar.  Target systolic blood pressure is 120-140.  Vital signs are checked at rest and with activity.  Blood pressure more consistent recently.      4. Obstipation: Working to stay ahead of her constipation with oral meds now.    Positive flatus. 5. Lumbar DDD with sciatica: Diathermy, therapeutic exercises and careful adjustments of gait training to accommodate her foot drop.  Her chronic leg pain is a little less troublesome during therapy recently. .  6. Nausea with emesis: Zofran is used infrequently now.  Regular bowel intervention continues.    Symptoms are not limiting her oral intake.

## 2021-03-13 NOTE — PROGRESS NOTES
Discharge instructions with prescriptions given to patient and niece and signed.  All belongings packed up and pt was propelled by wheelchair to nieces auto patient discharged as ordered

## 2021-03-15 NOTE — DISCHARGE SUMMARY
term goal 3: Pt will complete total body dressing (UB/LB/Footwear) c MOD I by d/c  Long term goal 4: Pt will complete toileting c MOD I by d/c  Long term goal 5: Pt will complete functional transfer (toilet, tub, shower) c MOD I by d/c. Long term goals 6: Pt will perform therex/therax to facilitate increased strength/endurance/ax tolerance (c emphasis on dynamic standing balance/tolerance >10 mins and BUE endurance) c MOD I in order to complete ADLs. :                                       Eating: Eating  Assistance Needed: Independent  Comment: X  CARE Score: 6  Discharge Goal: Independent       Oral Hygiene: Oral Hygiene  Assistance Needed: Independent  Comment: X  CARE Score: 6  Discharge Goal: Independent    UB/LB Bathing: Shower/Bathe Self  Assistance Needed: Independent  Comment: X  CARE Score: 6  Discharge Goal: Independent    UB Dressing: Upper Body Dressing  Assistance Needed: Independent  Comment: X  CARE Score: 6  Discharge Goal: Independent         LB Dressing: Lower Body Dressing  Assistance Needed: Independent  Comment: Mod I with reacher  CARE Score: 6  Discharge Goal: Independent    Donning and [place] Footwear: Putting On/Taking Off Footwear  Assistance Needed: Independent  Comment: Mod I using long handle shoe horn and soxck aide to maximino socks and tennis shoes  CARE Score: 6  Discharge Goal: Independent      Toileting: Toileting Hygiene  Assistance Needed: Independent  Comment: X  CARE Score: 6  Discharge Goal: Independent      Toilet Transfers: Toilet Transfer  Assistance Needed: Independent  Comment: Mod I  CARE Score: 6  Discharge Goal: Independent    Physical Therapy:   Short term goals  Time Frame for Short term goals: 10 tx days:  Short term goal 1: Pt will complete bed mobility tasks (rolling L/R, scooting, and sup<->sit) Ind following posterior hip precautions. Short term goal 2: Pt will complete OOB transfers using RW Mod Ind.   Short term goal 3: Pt will ambulate 150 ft using RW over level surface and 10 ft over carpeted/transitional surface with SBA. Short term goal 4: Goal progressed 03/04/2021: Pt will ascend/descend curb step using RW with Sup. following appropriate step sequencing. Short term goal 5: Pt will complete object retrieval from the floor with 1UE support on RW and using reacher Mod Ind. Short term goal 6: Pt will propel manual w/c >/=150 ft Mod Ind. Short term goal 7: Goal added 03/04/2021: Pt will ascend/descend 4-5 steps using railings with Sup following appropriate step sequencing. Bed Mobility:   Sit to Lying  Assistance Needed: Independent  Comment: in regular bed  CARE Score: 6  Discharge Goal: Independent  Roll Left and Right  Assistance Needed: Independent  Comment: in regular bed with pillow between legs for hip precautions.   CARE Score: 6  Discharge Goal: Independent  Lying to Sitting on Side of Bed  Assistance Needed: Independent  Comment: in regular bed  CARE Score: 6  Discharge Goal: Independent    Transfers:    Sit to Stand  Assistance Needed: Independent  Comment: *  CARE Score: 6  Discharge Goal: Independent  Chair/Bed-to-Chair Transfer  Assistance Needed: Independent  Comment: *  CARE Score: 6  Discharge Goal: Independent  Toilet Transfer  Assistance Needed: Partial/moderate assistance(Min A using grab bars)  CARE Score: 3  Car Transfer  Assistance Needed: Independent  Comment: *  CARE Score: 6  Discharge Goal: Independent    Ambulation:    Walking Ability  Does the Patient Walk?: Yes     Walk 10 Feet  Assistance Needed: Independent  Comment: mod I with RW  CARE Score: 6  Discharge Goal: Supervision or touching assistance     Walk 50 Feet with Two Turns  Assistance Needed: Independent  Comment: mod I with RW  Reason if not Attempted: Not attempted due to medical condition or safety concerns(pt was only able to tolerate 18 ft today (limited by fatigue and dizziness))  CARE Score: 6  Discharge Goal: Supervision or touching assistance     Walk 150 Feet  Assistance Needed: Independent  Comment: mod I with RW  Reason if not Attempted: Not attempted due to medical condition or safety concerns  CARE Score: 6  Discharge Goal: Supervision or touching assistance     Walking 10 Feet on Uneven Surfaces  Assistance Needed: Independent  Comment: mod I with RW  CARE Score: 6  Discharge Goal: Supervision or touching assistance     1 Step (Curb)  Assistance Needed: Independent  Comment: mod I with RW  Reason if not Attempted: Not attempted due to medical condition or safety concerns  CARE Score: 6  Discharge Goal: Supervision or touching assistance     4 Steps  Assistance Needed: Independent  Comment: Mod I with 2 rails  Reason if not Attempted: Not applicable  CARE Score: 6  Discharge Goal: Not Applicable     12 Steps  Assistance Needed: Independent  Comment: mod I with 2 rails  Reason if not Attempted: Not applicable  CARE Score: 6  Discharge Goal: Not Applicable       Wheelchair:  w/c Ability: Wheelchair Ability  Uses a Wheelchair and/or Scooter?: No  Wheel 50 Feet with Two Turns  Assistance Needed: Supervision or touching assistance(SBA-Sup)  CARE Score: 4  Discharge Goal: Independent  Wheel 150 Feet  Assistance Needed: Partial/moderate assistance(pt was only able to propel up to 75 ft with SBA-Sup; activity tolerance was limited by fatigue and worsening lethargy)  CARE Score: 3  Discharge Goal: Independent          Balance:        Object: Picking Up Object  Assistance Needed: Independent  Comment: mod I with reacher  CARE Score: 6  Discharge Goal: Independent    I      Exam:    Blood pressure (!) 150/69, pulse 68, temperature 98 °F (36.7 °C), temperature source Oral, resp. rate 16, height 5' 2.99\" (1.6 m), weight 102 lb 12.8 oz (46.6 kg), SpO2 94 %. General: Sitting up in bed. Alert and oriented. Hard of hearing but able to ask insightful questions. In no distress. HEENT: Full visual field. MMM. Neck supple. No JVD.     Pulmonary: Symmetric air exchange without rales or rhonchi. Cardiac: Regular rate and rhythm. Abdomen: Patient's abdomen is soft and nondistended. Bowel sounds were present throughout. There was no rebound, guarding or masses noted. Upper extremities: Fair  strength without new bruising or swelling. Lower extremities: Incision is well approximated and dry after staples were removed. Mild swelling in the thigh as expected. Guarded hip flexion. No signs of DVT. Sitting balance was good. Standing balance was fair-.    Lab Results   Component Value Date    WBC 6.4 03/13/2021    HGB 9.4 (L) 03/13/2021    HCT 31.2 (L) 03/13/2021    MCV 99.7 03/13/2021     03/13/2021     Lab Results   Component Value Date    INR 0.91 02/22/2021    PROTIME 11.0 (L) 02/22/2021     Lab Results   Component Value Date    CREATININE 1.0 03/13/2021    BUN 28 (H) 03/13/2021     03/13/2021    K 4.6 03/13/2021     03/13/2021    CO2 29 03/13/2021     Lab Results   Component Value Date     (H) 02/23/2021     (H) 02/23/2021    ALKPHOS 135 (H) 02/23/2021    BILITOT 1.2 (H) 02/23/2021           The patient presented to the ARU with the above history requiring a multidisciplinary treatment plan including close medical supervision by the [facility] Director. The patient participated in the prescribed therapy treatment plan with reasonable compliance and progressive tolerance. They avoided significant medical complications. By the time of discharge the patient had become safer with adaptive equipment to transfer and toilet with a FWW with the supervision of family/caregivers. The patient was tolerating an oral diet without choking/coughing and was back to their baseline with regards to bowel and bladder control. Discharge instructions were reviewed with the patient. . The patient is to follow up with the orthopedist and her PCP in 1 week. No driving. [facility] PT/OT/RN will be arranged.      Natalia Chen, [address] Medication Instructions [facility]:[id number]    Printed on:03/15/21 8015   Medication Information                      aspirin 81 MG EC tablet  Take 1 tablet by mouth daily             cloNIDine (CATAPRES) 0.1 MG tablet  Take 0.1 mg by mouth 2 times daily             dilTIAZem (CARDIZEM CD) 240 MG extended release capsule  Take 240 mg by mouth daily             docusate sodium (COLACE, DULCOLAX) 100 MG CAPS  Take 100 mg by mouth daily             enoxaparin (LOVENOX) 30 MG/0.3ML injection  Inject 0.3 mLs into the skin daily for 4 days             gabapentin (NEURONTIN) 100 MG capsule  Take 100 mg by mouth 2 times daily as needed. losartan (COZAAR) 50 MG tablet  Take 50 mg by mouth daily             Multiple Vitamin (MULTIVITAMIN) TABS tablet  Take 1 tablet by mouth daily             senna (SENOKOT) 8.6 MG tablet  Take 1 tablet by mouth nightly             traMADol (ULTRAM) 50 MG tablet  Take 1 tablet by mouth every 6 hours as needed for Pain for up to 7 days. CONDITION ON DISCHARGE: Stable. The prognosis is fair for further improvements in ADL's and safety with adapted gait/transfers. Record review, patient exam, discharge instructions, medication reconciliation and summary for this discharge visit took more than 30 minutes.

## 2021-03-23 ENCOUNTER — OFFICE VISIT (OUTPATIENT)
Dept: ORTHOPEDIC SURGERY | Age: 86
End: 2021-03-23

## 2021-03-23 VITALS
WEIGHT: 102 LBS | OXYGEN SATURATION: 97 % | HEIGHT: 62 IN | BODY MASS INDEX: 18.77 KG/M2 | RESPIRATION RATE: 16 BRPM | HEART RATE: 90 BPM

## 2021-03-23 DIAGNOSIS — S72.001D CLOSED FRACTURE OF NECK OF RIGHT FEMUR WITH ROUTINE HEALING, SUBSEQUENT ENCOUNTER: Primary | ICD-10-CM

## 2021-03-23 PROCEDURE — 99024 POSTOP FOLLOW-UP VISIT: CPT | Performed by: ORTHOPAEDIC SURGERY

## 2021-03-23 NOTE — PROGRESS NOTES
ORTHOPEDIC PROGRESS NOTE    3/23/2021    Patient name: Juliana Mathis  : 1923    SUBJECTIVE   The patient was seen and examined. Juliana Mathis is a 80 y.o. female 4 weeks s/p right hip julia. Patient returns doing very well. She minimizes complaints. She understands hip precautions. She would like to transition off of walker if possible. We discussed recovery and return to activities. All questions answered. OBJECTIVE     Vitals:    21 1449   Pulse: 90   Resp: 16   SpO2: 97%       Physical Exam:   GEN: AO NAD  MS: RLE: CR<2sec, SILT, DF ankle, able to perform straight leg raise, ambulated in with walker. Labs:   CBC/COAGS  No results for input(s): WBC, HEMOGLOBIN, HCT, PLT, INR in the last 72 hours. BMP  No results for input(s): NA, K, CL, CO2, BUN, CREATININE in the last 72 hours. Invalid input(s): GLU      ASSESSMENT     80 y.o. female with Right hip hemiarthroplasty DOS 2021    PLAN     1. Activity, PT/OT: weight bearing as tolerated. Continue posterior hip precautions for 2 more months  2. DVT prophylaxis: aspirin 325 mg po daily for 4 weeks  3. Follow up as needed.     Electronically signed by:Trell Gregorio MD, 3/23/2021 3:07 PM

## 2021-04-09 NOTE — DISCHARGE SUMMARY
[address] [place] [address], [address] [place] [address]                               DISCHARGE SUMMARY    PATIENT NAME: Kelly Bach                    :        1923  MED REC NO:   [id number]                          ROOM:       4128  ACCOUNT NO:   [de-identified]                           ADMIT DATE: 2021  PROVIDER:     Madeleine Good MD                  DISCHARGE DATE:  2021    DISCHARGE DIAGNOSES:  Include the following,  1. Closed fracture of neck of right femur. 2.  Acute blood loss anemia. 3.  Uncontrolled hypertension. 4.  Gait disturbance. 5.  Generalized weakness. 6.  History of right hip hemiarthroplasty. 7.  Obstipation. 8.  Uncontrolled pain. HOSPITAL COURSE:  She is a patient of Dr. Marry Cline and she follows  with Jinny Broderick in his office. She lives independently at the [facility]. She denies hitting her head. Dr. Kirsty Simms took over her care. The  patient was seen in consultation by Orthopedics and Dr. Tanna Gibson did  her hip hemiarthroplasty on 2021. The patient was carefully  followed. She continued to improve. She had some drop in her blood  count after surgery, which is an expected outcome. We continued with  physical therapy. She had some chronic pain in that leg and medications  were adjusted. Initially, the patient is denied for ARU by [facility] Medicare. An expedited appeal was pursued and I believe was successful. She also had some high potassium and was carefully followed for that and  did well. As of 2021, she was feeling better, was able to sit,  and was stable with physical therapy and her kidney function numbers  looked pretty good. She was discharged in improved condition to the  rehab unit. DISCHARGE MEDICATIONS:  Include the following,  1. Catapres 0.1 b.i.d.  2.  _____  3. Gabapentin 100 mg b.i.d. as needed for neuropathy. 4.  Diltiazem  mg daily.   5. Hydrochlorothiazide half a tablet 12.5 mg daily. 6.  During the course, had some pain medication as needed, I believe was  Percocet. The patient was discharged in improved condition to acute rehab unit for  rehab therapy.         Ishaan Hawk MD    D: 04/08/2021 13:59:14       T: 04/08/2021 14:07:27     [email or web address]  Job#: [id number]     Doc#: [id number]    CC:  Leona Carcamo

## 2021-05-18 ENCOUNTER — APPOINTMENT (OUTPATIENT)
Dept: CT IMAGING | Age: 86
End: 2021-05-18
Payer: MEDICARE

## 2021-05-18 ENCOUNTER — APPOINTMENT (OUTPATIENT)
Dept: GENERAL RADIOLOGY | Age: 86
End: 2021-05-18
Payer: MEDICARE

## 2021-05-18 ENCOUNTER — HOSPITAL ENCOUNTER (EMERGENCY)
Age: 86
Discharge: HOME | End: 2021-05-18
Attending: EMERGENCY MEDICINE
Payer: MEDICARE

## 2021-05-18 VITALS
OXYGEN SATURATION: 100 % | DIASTOLIC BLOOD PRESSURE: 56 MMHG | HEART RATE: 85 BPM | SYSTOLIC BLOOD PRESSURE: 160 MMHG | WEIGHT: 100 LBS | BODY MASS INDEX: 17.07 KG/M2 | RESPIRATION RATE: 16 BRPM | HEIGHT: 64 IN | TEMPERATURE: 97.9 F

## 2021-05-18 DIAGNOSIS — S09.90XA INJURY OF HEAD, INITIAL ENCOUNTER: Primary | ICD-10-CM
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
## 2021-05-18 DIAGNOSIS — M25.561 ACUTE PAIN OF RIGHT KNEE: ICD-10-CM

## 2021-05-18 DIAGNOSIS — M25.559 HIP PAIN: ICD-10-CM

## 2021-05-18 PROCEDURE — 99283 EMERGENCY DEPT VISIT LOW MDM: CPT

## 2021-05-18 PROCEDURE — 72125 CT NECK SPINE W/O DYE: CPT

## 2021-05-18 PROCEDURE — 72170 X-RAY EXAM OF PELVIS: CPT

## 2021-05-18 PROCEDURE — 70450 CT HEAD/BRAIN W/O DYE: CPT

## 2021-05-18 RX ORDER — ACETAMINOPHEN 325 MG/1
650 TABLET ORAL ONCE
Status: DISCONTINUED | OUTPATIENT
Start: 2021-05-18 | End: 2021-05-18 | Stop reason: HOSPADM

## 2021-05-18 ASSESSMENT — PAIN SCALES - GENERAL: PAINLEVEL_OUTOF10: 7

## 2021-05-18 NOTE — ED PROVIDER NOTES
EMERGENCY DEPARTMENT ENCOUNTER    Patient: Zohaib Wallace  MRN: [id number]  : 1923  Date of Evaluation: 2021  ED Provider:  Jay Pinto MD    CHIEF COMPLAINT  Chief Complaint   Patient presents with    Fall     fall on to concrete. head injury. on asa    Head Injury    Trauma       HPI  Zohaib Wallace is a 80 y.o. female who presents for evaluation after a fall which occurred shortly before arrival to the emergency department. The patient states that she was digging in the soil outside her home and lost her balance and fell backwards and hit the back of her head on concrete sidewalk. She denies any loss consciousness. She does have some swelling of the mid posterior scalp with mild to moderate aching pain in this location. Denies any open wounds. She denies pain anywhere else. She states she is on a baby aspirin every other day but denies any other blood thinning medications and denies a history of bleeding disorder. She denies any vision changes, dizziness, nausea, loss of sensation, focal weakness, difficulty breathing. Denies any other associated symptoms or complaints or concerns. REVIEW OF SYSTEMS    Constitutional: negative for fever, chills  Neurological: negative for focal weakness, loss of sensation  Ophthalmic: negative for vision change  ENT: negative for congestion, rhinorrhea  Cardiovascular: negative for chest pain  Respiratory: negative for SOB, cough  GI: negative for abdominal pain, nausea, vomiting, diarrhea, constipation  : negative for dysuria, hematuria  Musculoskeletal: negative for myalgias, decreased ROM, joint swelling  Dermatological: negative for rash, wounds  Heme: Negative for bleeding, bruising      PAST MEDICAL HISTORY  No past medical history on file.     CURRENT MEDICATIONS  [unfilled]    ALLERGIES  No Known Allergies    SURGICAL HISTORY  Past Surgical History:   Procedure Laterality Date    HIP SURGERY Right 2021    HIP HEMIARTHROPLASTY RIGHT performed by Jaspal Rosales MD at [address]  No family history on file. SOCIAL HISTORY  Social History     Socioeconomic History    Marital status:      Spouse name: Not on file    Number of children: Not on file    Years of education: Not on file    Highest education level: Not on file   Occupational History    Not on file   Tobacco Use    Smoking status: Never Smoker    Smokeless tobacco: Never Used   Substance and Sexual Activity    Alcohol use: Not on file    Drug use: Not on file    Sexual activity: Not on file   Other Topics Concern    Not on file   Social History Narrative    Not on file     Social Determinants of Health     Financial Resource Strain:     Difficulty of Paying Living Expenses:    Food Insecurity:     Worried About Running Out of Food in the Last Year:     [address] in the Last Year:    Transportation Needs:     Lack of Transportation (Medical):      Lack of Transportation (Non-Medical):    Physical Activity:     Days of Exercise per Week:     Minutes of Exercise per Session:    Stress:     Feeling of Stress :    Social Connections:     Frequency of Communication with Friends and Family:     Frequency of Social Gatherings with Friends and Family:     Attends Shinto Services:     Active Member of Clubs or Organizations:     Attends Club or Organization Meetings:     Marital Status:    Intimate Partner Violence:     Fear of Current or Ex-Partner:     Emotionally Abused:     Physically Abused:     Sexually Abused:          **Past medical, family and social histories, and nursing notes reviewed and verified by me**      PHYSICAL EXAM  VITAL SIGNS:   ED Triage Vitals [05/18/21 1530]   Enc Vitals Group      BP (!) 160/56      Pulse 85      Resp 16      Temp 97.9 °F (36.6 °C)      Temp Source Oral      SpO2 100 %      Weight 100 lb (45.4 kg)      Height 5' 4\" (1.626 m)      Head Circumference       Peak Flow       Pain Score 05/18/21. ABNORMAL LABS:  Labs Reviewed - No data to display      IMAGING STUDIES ORDERED:  CT HEAD WO CONTRAST  CT CERVICAL SPINE WO CONTRAST  XR PELVIS (1-2 VIEWS)    I have personally viewed the imaging studies. The radiologist interpretation is:   XR PELVIS (1-2 VIEWS)   Final Result   No acute abnormality. Stable right hip prosthesis. Mild degenerative changes to both SI joints. Degenerative changes to the   visualized lower lumbar spine. Diffuse bone demineralization. CT CERVICAL SPINE WO CONTRAST   Final Result   No acute intracranial abnormality. No acute fracture or subluxation in the cervical spine. CT HEAD WO CONTRAST   Final Result   No acute intracranial abnormality. No acute fracture or subluxation in the cervical spine. MEDICATIONS ADMINISTERED:  Medications - No data to display      COURSE & MEDICAL DECISION MAKING  Last vitals: BP (!) 160/56   Pulse 85   Temp 97.9 °F (36.6 °C) (Oral)   Resp 16   Ht 5' 4\" (1.626 m)   Wt 100 lb (45.4 kg)   SpO2 100%   BMI 17.16 kg/m²     80year-old female with closed head injury after fall. No clinical radiographic evidence for serious injury. She is neurovascularly intact. Vital signs reassuring and stable. She refused x-ray of the knee but has been able to ambulate without difficulty. Pain well controlled. Additional workup and treatment in the ED as documented above. Patient reassured and will be discharged home. I have explained to the patient in appropriate terminology our work-up in the ED and their diagnosis. I have also given anticipatory guidance and expectant management of their condition as an outpatient as per my custom. The patient was given clear discharge and follow-up instructions including return to the ER immediately for worsening concerns.  The patient has been advised to follow-up with their primary care physician and/or referred physician in the next two to three days or sooner if worsening and to return to the ER immediately as above with any concerns. I provided the patient counseling with regard to my customary list of strict return precautions as well as return precautions specific to the cause for today's emergency department visit. The patient will return under these provided conditions, but should also return for new concerns or further worsening. Pt and/or family understand and agree with plan. Clinical Impression:  1. Injury of head, initial encounter    2. Hip pain    3. Acute pain of right knee    4. Fall, initial encounter        Disposition referral (if applicable):  Praneeth Tubbs PA-C  [address] [place], [place] [id number] [phone]    Schedule an appointment as soon as possible for a visit       [facility] Emergency Department  [place] [id number] [phone]    If symptoms worsen      Disposition medications (if applicable):  Discharge Medication List as of 5/18/2021  5:27 PM          ED Provider Disposition Time  DISPOSITION            Electronically signed by: Linnette Fields M.D., 5/18/2021 10:54 PM      This dictation was created with voice recognition software. While attempts have been made to review the dictation as it is transcribed, on occasion the spoken word can be misinterpreted by the technology leading to omissions or inappropriate words, phrases or sentences.         Siva Talbot MD  05/18/21 6215

## 2021-05-18 NOTE — ED TRIAGE NOTES
Pt presents to ED by EMS from [facility] living. Pt had an unwitnessed fall backwards onto concrete, no LOC. Pt had hematoma to the head posterior. No open wound. Pt states she takes 81 mg asa daily.  Pt is A&O x4 with no distress

## 2021-05-18 NOTE — ED NOTES
SALOME Garcia at bedside     [facility], [facility]  05/18/21 0340

## 2021-11-17 ENCOUNTER — OFFICE VISIT (OUTPATIENT)
Dept: INTERNAL MEDICINE CLINIC | Age: 86
End: 2021-11-17
Payer: MEDICARE

## 2021-11-17 VITALS
TEMPERATURE: 97.4 F | DIASTOLIC BLOOD PRESSURE: 70 MMHG | HEART RATE: 86 BPM | SYSTOLIC BLOOD PRESSURE: 130 MMHG | OXYGEN SATURATION: 98 % | BODY MASS INDEX: 18.99 KG/M2 | HEIGHT: 62 IN | WEIGHT: 103.2 LBS

## 2021-11-17 DIAGNOSIS — G47.00 INSOMNIA, UNSPECIFIED TYPE: ICD-10-CM

## 2021-11-17 DIAGNOSIS — I10 PRIMARY HYPERTENSION: Primary | ICD-10-CM

## 2021-11-17 DIAGNOSIS — G25.81 RESTLESS LEGS: ICD-10-CM

## 2021-11-17 DIAGNOSIS — M81.6 LOCALIZED OSTEOPOROSIS WITHOUT CURRENT PATHOLOGICAL FRACTURE: ICD-10-CM

## 2021-11-17 PROCEDURE — 1123F ACP DISCUSS/DSCN MKR DOCD: CPT | Performed by: FAMILY MEDICINE

## 2021-11-17 PROCEDURE — G8427 DOCREV CUR MEDS BY ELIG CLIN: HCPCS | Performed by: FAMILY MEDICINE

## 2021-11-17 PROCEDURE — G8420 CALC BMI NORM PARAMETERS: HCPCS | Performed by: FAMILY MEDICINE

## 2021-11-17 PROCEDURE — 4040F PNEUMOC VAC/ADMIN/RCVD: CPT | Performed by: FAMILY MEDICINE

## 2021-11-17 PROCEDURE — 99203 OFFICE O/P NEW LOW 30 MIN: CPT | Performed by: FAMILY MEDICINE

## 2021-11-17 PROCEDURE — 1090F PRES/ABSN URINE INCON ASSESS: CPT | Performed by: FAMILY MEDICINE

## 2021-11-17 PROCEDURE — 1036F TOBACCO NON-USER: CPT | Performed by: FAMILY MEDICINE

## 2021-11-17 PROCEDURE — G8484 FLU IMMUNIZE NO ADMIN: HCPCS | Performed by: FAMILY MEDICINE

## 2021-11-17 RX ORDER — FUROSEMIDE 20 MG/1
1 TABLET ORAL
COMMUNITY
End: 2022-06-29

## 2021-11-17 RX ORDER — TRAZODONE HYDROCHLORIDE 50 MG/1
1 TABLET ORAL DAILY
COMMUNITY
Start: 2021-10-26 | End: 2021-11-17

## 2021-11-17 RX ORDER — HYDROCHLOROTHIAZIDE 25 MG/1
1 TABLET ORAL DAILY
COMMUNITY
End: 2022-02-09 | Stop reason: SDUPTHER

## 2021-11-17 SDOH — ECONOMIC STABILITY: FOOD INSECURITY: WITHIN THE PAST 12 MONTHS, YOU WORRIED THAT YOUR FOOD WOULD RUN OUT BEFORE YOU GOT MONEY TO BUY MORE.: NEVER TRUE

## 2021-11-17 SDOH — ECONOMIC STABILITY: FOOD INSECURITY: WITHIN THE PAST 12 MONTHS, THE FOOD YOU BOUGHT JUST DIDN'T LAST AND YOU DIDN'T HAVE MONEY TO GET MORE.: NEVER TRUE

## 2021-11-17 ASSESSMENT — ENCOUNTER SYMPTOMS
DIARRHEA: 0
BACK PAIN: 0
NAUSEA: 0
BLOOD IN STOOL: 0
COUGH: 0
CONSTIPATION: 0
ABDOMINAL PAIN: 0
SHORTNESS OF BREATH: 0
EYES NEGATIVE: 1

## 2021-11-17 ASSESSMENT — SOCIAL DETERMINANTS OF HEALTH (SDOH): HOW HARD IS IT FOR YOU TO PAY FOR THE VERY BASICS LIKE FOOD, HOUSING, MEDICAL CARE, AND HEATING?: NOT HARD AT ALL

## 2021-11-17 ASSESSMENT — PATIENT HEALTH QUESTIONNAIRE - PHQ9
2. FEELING DOWN, DEPRESSED OR HOPELESS: 0
SUM OF ALL RESPONSES TO PHQ9 QUESTIONS 1 & 2: 0
SUM OF ALL RESPONSES TO PHQ QUESTIONS 1-9: 0
SUM OF ALL RESPONSES TO PHQ QUESTIONS 1-9: 0
1. LITTLE INTEREST OR PLEASURE IN DOING THINGS: 0
SUM OF ALL RESPONSES TO PHQ QUESTIONS 1-9: 0

## 2021-11-17 NOTE — PATIENT INSTRUCTIONS
We are committed to providing you the best care possible. If you receive a survey after visiting one of our offices, please take time to share your experience concerning your physician office visit. These surveys are confidential and no health information about you is shared. We are eager to improve for you and we are counting on your feedback to help make that happen. We are committed to providing you the best care possible. If you receive a survey after visiting one of our offices, please take time to share your experience concerning your physician office visit. These surveys are confidential and no health information about you is shared. We are eager to improve for you and we are counting on your feedback to help make that happen.

## 2021-11-17 NOTE — PROGRESS NOTES
Yehuda Mancini (:  1923) is a 80 y.o. female,New patient, here for evaluation of the following chief complaint(s):  New Patient, Hypertension, and Other         ASSESSMENT/PLAN:  1. Primary hypertension, chronic  2. Restless legs, chronic  3. Localized osteoporosis without current pathological fracture  4. Insomnia, unspecified type  Sleep hygiene  Pt to try Melatonin  Continue medications  Obtain old records  Obtain last [facility] labs  On this date 2021 I have spent 30 minutes reviewing previous notes, test results and face to face with the patient discussing the diagnosis and importance of compliance with the treatment plan as well as documenting on the day of the visit. Return for Follow up in 3 to 4 months for HTN. Subjective   SUBJECTIVE/OBJECTIVE:  HPI: This 79 yo F here with her neice for the following  Patient Active Problem List    Diagnosis Date Noted    RLS (restless legs syndrome) 2021    Age-related osteoporosis without current pathological fracture 2021    Insomnia 2021    DDD (degenerative disc disease), lumbar     Sciatica, left side     Gait disturbance     History of right hip hemiarthroplasty     Primary hypertension      Insomnia- Pt states she has trouble sleeping since her Clonidine was stopped by previous PCP  HTN-stable on medications  RLS and sciatica- On Gabapentin  Osteoporosis    Review of Systems   Constitutional: Negative for chills, diaphoresis and fever. HENT: Negative. Eyes: Negative. Respiratory: Negative for cough and shortness of breath. Cardiovascular: Negative for chest pain and palpitations. Gastrointestinal: Negative for abdominal pain, blood in stool, constipation, diarrhea and nausea. Genitourinary: Negative for dysuria. Musculoskeletal: Negative for back pain. Neurological: Negative for dizziness, light-headedness and headaches. Psychiatric/Behavioral: Positive for sleep disturbance.  Negative for dysphoric mood. No Known Allergies  Current Outpatient Medications   Medication Sig Dispense Refill    furosemide (LASIX) 20 MG tablet Take 1 tablet by mouth Twice a Week Mon and Friday only      hydroCHLOROthiazide (HYDRODIURIL) 25 MG tablet Take 1 tablet by mouth daily      aspirin 81 MG EC tablet Take 1 tablet by mouth daily (Patient taking differently: One tablet every other day) 30 tablet 3    gabapentin (NEURONTIN) 100 MG capsule Take 300 mg by mouth nightly.  dilTIAZem (CARDIZEM CD) 240 MG extended release capsule Take 240 mg by mouth daily       No current facility-administered medications for this visit. Past Medical History:   Diagnosis Date    Acute blood loss anemia     Age-related osteoporosis without current pathological fracture     Closed displaced fracture of right femoral neck (HCC) 02/28/2021    Generalized weakness     Insomnia     Neuropathy     Obstipation     Pedal edema     Primary hypertension     RLS (restless legs syndrome)      Past Surgical History:   Procedure Laterality Date    HIP SURGERY Right 2/23/2021    HIP HEMIARTHROPLASTY RIGHT performed by Lu Pitt MD at [address] Use    Smoking status: Never Smoker    Smokeless tobacco: Never Used   Substance Use Topics    Alcohol use: Never    Drug use: Never            Vitals:    11/17/21 0959 11/17/21 1010   BP: (!) 150/60 130/70   Pulse: 86    Temp: 97.4 °F (36.3 °C)    SpO2: 98%    Weight: 103 lb 3.2 oz (46.8 kg)    Height: 5' 2\" (1.575 m)      Objective   Physical Exam  Vitals reviewed. Constitutional:       General: She is not in acute distress. HENT:      Right Ear: Tympanic membrane normal.      Left Ear: Tympanic membrane normal.   Eyes:      Extraocular Movements: Extraocular movements intact. Conjunctiva/sclera: Conjunctivae normal.      Pupils: Pupils are equal, round, and reactive to light.    Cardiovascular:      Rate and Rhythm: Normal rate and regular

## 2021-11-20 PROBLEM — G47.00 INSOMNIA: Status: ACTIVE | Noted: 2021-11-20

## 2021-11-20 PROBLEM — S72.009A HIP FRACTURE, UNSPECIFIED LATERALITY, CLOSED, INITIAL ENCOUNTER (HCC): Status: RESOLVED | Noted: 2021-02-22 | Resolved: 2021-11-20

## 2021-11-20 PROBLEM — G25.81 RLS (RESTLESS LEGS SYNDROME): Status: ACTIVE | Noted: 2021-11-20

## 2021-11-20 PROBLEM — S72.001A CLOSED DISPLACED FRACTURE OF RIGHT FEMORAL NECK (HCC): Status: RESOLVED | Noted: 2021-02-28 | Resolved: 2021-11-20

## 2021-11-20 PROBLEM — M81.0 AGE-RELATED OSTEOPOROSIS WITHOUT CURRENT PATHOLOGICAL FRACTURE: Status: ACTIVE | Noted: 2021-11-20

## 2021-12-09 ENCOUNTER — TELEPHONE (OUTPATIENT)
Dept: INTERNAL MEDICINE CLINIC | Age: 86
End: 2021-12-09

## 2022-02-09 RX ORDER — HYDROCHLOROTHIAZIDE 25 MG/1
25 TABLET ORAL DAILY
Qty: 30 TABLET | Refills: 0 | Status: SHIPPED | OUTPATIENT
Start: 2022-02-09 | End: 2022-02-16 | Stop reason: SDUPTHER

## 2022-02-16 ENCOUNTER — OFFICE VISIT (OUTPATIENT)
Dept: INTERNAL MEDICINE CLINIC | Age: 87
End: 2022-02-16
Payer: MEDICARE

## 2022-02-16 VITALS
BODY MASS INDEX: 19.31 KG/M2 | HEART RATE: 79 BPM | HEIGHT: 63 IN | OXYGEN SATURATION: 97 % | WEIGHT: 109 LBS | SYSTOLIC BLOOD PRESSURE: 140 MMHG | TEMPERATURE: 96.9 F | DIASTOLIC BLOOD PRESSURE: 80 MMHG

## 2022-02-16 DIAGNOSIS — R53.83 FATIGUE, UNSPECIFIED TYPE: ICD-10-CM

## 2022-02-16 DIAGNOSIS — I10 PRIMARY HYPERTENSION: Primary | ICD-10-CM

## 2022-02-16 DIAGNOSIS — G25.81 RESTLESS LEGS: ICD-10-CM

## 2022-02-16 DIAGNOSIS — R60.9 EDEMA, UNSPECIFIED TYPE: ICD-10-CM

## 2022-02-16 LAB
A/G RATIO: 1.7 (ref 1.1–2.2)
ALBUMIN SERPL-MCNC: 4.4 G/DL (ref 3.4–5)
ALP BLD-CCNC: 104 U/L (ref 40–129)
ALT SERPL-CCNC: 18 U/L (ref 10–40)
ANION GAP SERPL CALCULATED.3IONS-SCNC: 17 MMOL/L (ref 3–16)
AST SERPL-CCNC: 18 U/L (ref 15–37)
BACTERIA: ABNORMAL /HPF
BASOPHILS ABSOLUTE: 0.1 K/UL (ref 0–0.2)
BASOPHILS RELATIVE PERCENT: 0.9 %
BILIRUB SERPL-MCNC: 0.3 MG/DL (ref 0–1)
BILIRUBIN URINE: NEGATIVE
BLOOD, URINE: ABNORMAL
BUN BLDV-MCNC: 34 MG/DL (ref 7–20)
CALCIUM SERPL-MCNC: 9.1 MG/DL (ref 8.3–10.6)
CHLORIDE BLD-SCNC: 100 MMOL/L (ref 99–110)
CLARITY: ABNORMAL
CO2: 24 MMOL/L (ref 21–32)
COLOR: YELLOW
CREAT SERPL-MCNC: 1.3 MG/DL (ref 0.6–1.2)
EOSINOPHILS ABSOLUTE: 0 K/UL (ref 0–0.6)
EOSINOPHILS RELATIVE PERCENT: 0.7 %
EPITHELIAL CELLS, UA: 0 /HPF (ref 0–5)
GFR AFRICAN AMERICAN: 46
GFR NON-AFRICAN AMERICAN: 38
GLUCOSE BLD-MCNC: 159 MG/DL (ref 70–99)
GLUCOSE URINE: NEGATIVE MG/DL
HCT VFR BLD CALC: 37.2 % (ref 36–48)
HEMOGLOBIN: 12.4 G/DL (ref 12–16)
HYALINE CASTS: 0 /LPF (ref 0–8)
KETONES, URINE: NEGATIVE MG/DL
LEUKOCYTE ESTERASE, URINE: ABNORMAL
LYMPHOCYTES ABSOLUTE: 1.5 K/UL (ref 1–5.1)
LYMPHOCYTES RELATIVE PERCENT: 22.8 %
MCH RBC QN AUTO: 30.2 PG (ref 26–34)
MCHC RBC AUTO-ENTMCNC: 33.4 G/DL (ref 31–36)
MCV RBC AUTO: 90.3 FL (ref 80–100)
MICROSCOPIC EXAMINATION: YES
MONOCYTES ABSOLUTE: 0.4 K/UL (ref 0–1.3)
MONOCYTES RELATIVE PERCENT: 5.3 %
NEUTROPHILS ABSOLUTE: 4.7 K/UL (ref 1.7–7.7)
NEUTROPHILS RELATIVE PERCENT: 70.3 %
NITRITE, URINE: POSITIVE
PDW BLD-RTO: 14.3 % (ref 12.4–15.4)
PH UA: 6.5 (ref 5–8)
PLATELET # BLD: 205 K/UL (ref 135–450)
PMV BLD AUTO: 9.1 FL (ref 5–10.5)
POTASSIUM SERPL-SCNC: 4.6 MMOL/L (ref 3.5–5.1)
PROTEIN UA: ABNORMAL MG/DL
RBC # BLD: 4.12 M/UL (ref 4–5.2)
RBC UA: 6 /HPF (ref 0–4)
SODIUM BLD-SCNC: 141 MMOL/L (ref 136–145)
SPECIFIC GRAVITY UA: 1.02 (ref 1–1.03)
TOTAL PROTEIN: 7 G/DL (ref 6.4–8.2)
TSH REFLEX FT4: 2.04 UIU/ML (ref 0.27–4.2)
URINE REFLEX TO CULTURE: YES
URINE TYPE: ABNORMAL
UROBILINOGEN, URINE: 0.2 E.U./DL
WBC # BLD: 6.7 K/UL (ref 4–11)
WBC UA: 504 /HPF (ref 0–5)

## 2022-02-16 PROCEDURE — 99214 OFFICE O/P EST MOD 30 MIN: CPT | Performed by: FAMILY MEDICINE

## 2022-02-16 PROCEDURE — 81003 URINALYSIS AUTO W/O SCOPE: CPT | Performed by: FAMILY MEDICINE

## 2022-02-16 PROCEDURE — 1090F PRES/ABSN URINE INCON ASSESS: CPT | Performed by: FAMILY MEDICINE

## 2022-02-16 PROCEDURE — 1123F ACP DISCUSS/DSCN MKR DOCD: CPT | Performed by: FAMILY MEDICINE

## 2022-02-16 PROCEDURE — G8427 DOCREV CUR MEDS BY ELIG CLIN: HCPCS | Performed by: FAMILY MEDICINE

## 2022-02-16 PROCEDURE — G8484 FLU IMMUNIZE NO ADMIN: HCPCS | Performed by: FAMILY MEDICINE

## 2022-02-16 PROCEDURE — 1036F TOBACCO NON-USER: CPT | Performed by: FAMILY MEDICINE

## 2022-02-16 PROCEDURE — 4040F PNEUMOC VAC/ADMIN/RCVD: CPT | Performed by: FAMILY MEDICINE

## 2022-02-16 PROCEDURE — G8420 CALC BMI NORM PARAMETERS: HCPCS | Performed by: FAMILY MEDICINE

## 2022-02-16 PROCEDURE — 36415 COLL VENOUS BLD VENIPUNCTURE: CPT | Performed by: FAMILY MEDICINE

## 2022-02-16 RX ORDER — GABAPENTIN 300 MG/1
300 CAPSULE ORAL NIGHTLY
Qty: 90 CAPSULE | Refills: 1 | Status: SHIPPED | OUTPATIENT
Start: 2022-02-16 | End: 2022-06-29 | Stop reason: SDUPTHER

## 2022-02-16 RX ORDER — HYDROCHLOROTHIAZIDE 25 MG/1
25 TABLET ORAL DAILY
Qty: 90 TABLET | Refills: 1 | Status: SHIPPED | OUTPATIENT
Start: 2022-02-16 | End: 2022-03-02 | Stop reason: SDUPTHER

## 2022-02-16 ASSESSMENT — ENCOUNTER SYMPTOMS
COUGH: 0
BACK PAIN: 0
SHORTNESS OF BREATH: 0
ABDOMINAL PAIN: 0
NAUSEA: 0

## 2022-02-16 NOTE — PROGRESS NOTES
Birgit Wing (:  1923) is a 80 y.o. female,Established patient, here for evaluation of the following chief complaint(s):  3 Month Follow-Up, Hypertension, and Other (chronic conditions)         ASSESSMENT/PLAN:  1. Primary hypertension, chronic  -     CBC with Auto Differential  -     Comprehensive Metabolic Panel  -     Urinalysis with Reflex to Culture  -     hydroCHLOROthiazide (HYDRODIURIL) 25 MG tablet; Take 1 tablet by mouth daily Take 1 tablet by mouth daily, Disp-90 tablet, R-1Normal  Continue Diltiazem  May need to stop Lasix  2. Restless legs, chronic  -     gabapentin (NEURONTIN) 300 MG capsule; Take 1 capsule by mouth nightly for 30 days. , Disp-90 capsule, R-1Normal  3. Fatigue, unspecified type  -     TSH with Reflex to FT4  4. Edema, unspecified type  Elevate legs    Obtain labs today as ordered  Previous labs reviewed: CBC, BMP, Mg  Obtain old records  On this date 2022 I have spent 30 minutes reviewing previous notes, test results and face to face with the patient discussing the diagnosis and importance of compliance with the treatment plan as well as documenting on the day of the visit. Return for Follow up in 4 to 5 months for HTN, Insomnia. Subjective   SUBJECTIVE/OBJECTIVE:  HPI: This  81 yo F here with [place] [facility] for the following  Patient Active Problem List    Diagnosis Date Noted    RLS (restless legs syndrome) 2021    Age-related osteoporosis without current pathological fracture 2021    Insomnia 2021    DDD (degenerative disc disease), lumbar     Sciatica, left side     Gait disturbance     History of right hip hemiarthroplasty     Primary hypertension      HTN- BP slightly higher today  RLS- Pt currently has increased the Gabapentin 100 to 300. Pt is using at night and rarely has to get up in the nighttime  Fatigue- Pt states she is tired due to her age. She is sleeping better at night  LE edema.  Pt states she has noticed edema since she fell a year ago and she had a R hip fracture. The R is always slightly larger than the left. Pt states her previous PCP had her take HCTZ and Lasix. Lasix used twice a week. She states the Clonidine was stopped. No pain in her legs. No Cp or Sob. She can lay flat. Review of Systems   Constitutional: Negative for diaphoresis and fever. Respiratory: Negative for cough and shortness of breath. Cardiovascular: Negative for chest pain and palpitations. Gastrointestinal: Negative for abdominal pain and nausea. Genitourinary: Negative for difficulty urinating. Musculoskeletal: Negative for back pain. Neurological: Negative for dizziness and headaches. No Known Allergies  Current Outpatient Medications   Medication Sig Dispense Refill    gabapentin (NEURONTIN) 300 MG capsule Take 1 capsule by mouth nightly for 30 days. 90 capsule 1    hydroCHLOROthiazide (HYDRODIURIL) 25 MG tablet Take 1 tablet by mouth daily Take 1 tablet by mouth daily 90 tablet 1    furosemide (LASIX) 20 MG tablet Take 1 tablet by mouth Twice a Week Mon and Friday only      aspirin 81 MG EC tablet Take 1 tablet by mouth daily 30 tablet 3    dilTIAZem (CARDIZEM CD) 240 MG extended release capsule Take 240 mg by mouth daily       No current facility-administered medications for this visit. Vitals:    02/16/22 1259   BP: (!) 140/80   Site: Right Upper Arm   Position: Sitting   Cuff Size: Small Adult   Pulse: 79   Temp: 96.9 °F (36.1 °C)   SpO2: 97%   Weight: 109 lb (49.4 kg)   Height: 5' 3\" (1.6 m)     Objective   Physical Exam  Vitals reviewed. Constitutional:       General: She is not in acute distress. Eyes:      Conjunctiva/sclera: Conjunctivae normal.   Cardiovascular:      Rate and Rhythm: Normal rate and regular rhythm. Pulmonary:      Effort: Pulmonary effort is normal. No respiratory distress. Breath sounds: Normal breath sounds.    Abdominal:      General: Bowel sounds are normal.      Palpations: Abdomen is soft. Tenderness: There is no abdominal tenderness. Musculoskeletal:         General: No tenderness. Cervical back: Neck supple. Right lower leg: Edema (R>L) present. Left lower leg: Edema present. Neurological:      Mental Status: She is alert and oriented to person, place, and time. Psychiatric:         Mood and Affect: Mood normal.                An electronic signature was used to authenticate this note.     --Modesta Milks, DO

## 2022-02-18 ENCOUNTER — TELEPHONE (OUTPATIENT)
Dept: INTERNAL MEDICINE CLINIC | Age: 87
End: 2022-02-18

## 2022-02-18 LAB
ORGANISM: ABNORMAL
URINE CULTURE, ROUTINE: ABNORMAL

## 2022-02-18 NOTE — TELEPHONE ENCOUNTER
Spoke to [place]. Urine Cx pending. Pt to stop Lasix. She will hydrate. Await Cx results. We will need to recheck the Bmp.

## 2022-02-18 NOTE — TELEPHONE ENCOUNTER
Mauricio Chao returned call. She is aware PCP is gone for the weekend, but will keep her phone handy. If any meds are needed, please send to [facility].

## 2022-02-19 ENCOUNTER — TELEPHONE (OUTPATIENT)
Dept: INTERNAL MEDICINE CLINIC | Age: 87
End: 2022-02-19

## 2022-02-19 RX ORDER — CEPHALEXIN 500 MG/1
500 CAPSULE ORAL 2 TIMES DAILY
Qty: 14 CAPSULE | Refills: 0 | Status: SHIPPED | OUTPATIENT
Start: 2022-02-19 | End: 2022-06-29

## 2022-02-19 NOTE — TELEPHONE ENCOUNTER
Call Joan Valentine [phone] to schedule lab appt for BMP week of 2/28      Discussed urine culture- Pt having urine frequency. No flank pain or nausea. Will send in Keflex to [facility]. Pt will need to repeat BMP. Lab ordered and pt can come to office to obtain.  Joan Valentine aware

## 2022-03-02 ENCOUNTER — NURSE ONLY (OUTPATIENT)
Dept: INTERNAL MEDICINE CLINIC | Age: 87
End: 2022-03-02
Payer: MEDICARE

## 2022-03-02 VITALS — TEMPERATURE: 97 F

## 2022-03-02 DIAGNOSIS — I10 PRIMARY HYPERTENSION: ICD-10-CM

## 2022-03-02 DIAGNOSIS — I10 PRIMARY HYPERTENSION: Primary | ICD-10-CM

## 2022-03-02 PROCEDURE — 36415 COLL VENOUS BLD VENIPUNCTURE: CPT | Performed by: FAMILY MEDICINE

## 2022-03-02 RX ORDER — DILTIAZEM HYDROCHLORIDE 240 MG/1
240 CAPSULE, COATED, EXTENDED RELEASE ORAL DAILY
Qty: 90 CAPSULE | Refills: 1 | Status: SHIPPED | OUTPATIENT
Start: 2022-03-02 | End: 2022-03-04

## 2022-03-02 RX ORDER — HYDROCHLOROTHIAZIDE 25 MG/1
25 TABLET ORAL DAILY
Qty: 90 TABLET | Refills: 1 | Status: SHIPPED | OUTPATIENT
Start: 2022-03-02 | End: 2022-03-04 | Stop reason: SDUPTHER

## 2022-03-03 LAB
ANION GAP SERPL CALCULATED.3IONS-SCNC: 14 MMOL/L (ref 3–16)
BUN BLDV-MCNC: 32 MG/DL (ref 7–20)
CALCIUM SERPL-MCNC: 9.3 MG/DL (ref 8.3–10.6)
CHLORIDE BLD-SCNC: 104 MMOL/L (ref 99–110)
CO2: 23 MMOL/L (ref 21–32)
CREAT SERPL-MCNC: 1.3 MG/DL (ref 0.6–1.2)
GFR AFRICAN AMERICAN: 46
GFR NON-AFRICAN AMERICAN: 38
GLUCOSE BLD-MCNC: 131 MG/DL (ref 70–99)
POTASSIUM SERPL-SCNC: 4.7 MMOL/L (ref 3.5–5.1)
SODIUM BLD-SCNC: 141 MMOL/L (ref 136–145)

## 2022-03-04 ENCOUNTER — TELEPHONE (OUTPATIENT)
Dept: INTERNAL MEDICINE CLINIC | Age: 87
End: 2022-03-04

## 2022-03-04 DIAGNOSIS — I10 PRIMARY HYPERTENSION: ICD-10-CM

## 2022-03-04 RX ORDER — DILTIAZEM HYDROCHLORIDE 240 MG/1
240 CAPSULE, EXTENDED RELEASE ORAL DAILY
Qty: 90 CAPSULE | Refills: 1 | Status: SHIPPED | OUTPATIENT
Start: 2022-03-04 | End: 2022-06-29 | Stop reason: SDUPTHER

## 2022-03-04 RX ORDER — HYDROCHLOROTHIAZIDE 25 MG/1
25 TABLET ORAL DAILY
Qty: 90 TABLET | Refills: 1 | Status: SHIPPED | OUTPATIENT
Start: 2022-03-04 | End: 2022-06-29 | Stop reason: SDUPTHER

## 2022-06-29 ENCOUNTER — OFFICE VISIT (OUTPATIENT)
Dept: INTERNAL MEDICINE CLINIC | Age: 87
End: 2022-06-29
Payer: MEDICARE

## 2022-06-29 VITALS
DIASTOLIC BLOOD PRESSURE: 60 MMHG | WEIGHT: 107.2 LBS | OXYGEN SATURATION: 96 % | SYSTOLIC BLOOD PRESSURE: 140 MMHG | BODY MASS INDEX: 19 KG/M2 | HEIGHT: 63 IN | HEART RATE: 81 BPM

## 2022-06-29 DIAGNOSIS — M79.669 PAIN AND SWELLING OF LOWER LEG, UNSPECIFIED LATERALITY: ICD-10-CM

## 2022-06-29 DIAGNOSIS — Z91.81 AT HIGH RISK FOR FALLS: ICD-10-CM

## 2022-06-29 DIAGNOSIS — M79.89 PAIN AND SWELLING OF LOWER LEG, UNSPECIFIED LATERALITY: ICD-10-CM

## 2022-06-29 DIAGNOSIS — G25.81 RESTLESS LEGS: ICD-10-CM

## 2022-06-29 DIAGNOSIS — R53.83 FATIGUE, UNSPECIFIED TYPE: ICD-10-CM

## 2022-06-29 DIAGNOSIS — I10 PRIMARY HYPERTENSION: Primary | ICD-10-CM

## 2022-06-29 DIAGNOSIS — R14.0 ABDOMINAL DISTENSION: ICD-10-CM

## 2022-06-29 DIAGNOSIS — N18.32 STAGE 3B CHRONIC KIDNEY DISEASE (HCC): ICD-10-CM

## 2022-06-29 PROBLEM — N18.30 CHRONIC RENAL DISEASE, STAGE III (HCC): Status: ACTIVE | Noted: 2022-06-29

## 2022-06-29 LAB
BACTERIA: ABNORMAL /HPF
BASOPHILS ABSOLUTE: 0 K/UL (ref 0–0.2)
BASOPHILS RELATIVE PERCENT: 0.7 %
BILIRUBIN URINE: NEGATIVE
BLOOD, URINE: ABNORMAL
CLARITY: ABNORMAL
COLOR: YELLOW
EOSINOPHILS ABSOLUTE: 0 K/UL (ref 0–0.6)
EOSINOPHILS RELATIVE PERCENT: 0.8 %
EPITHELIAL CELLS, UA: 1 /HPF (ref 0–5)
GLUCOSE URINE: NEGATIVE MG/DL
HCT VFR BLD CALC: 38.9 % (ref 36–48)
HEMOGLOBIN: 12.7 G/DL (ref 12–16)
HYALINE CASTS: 5 /LPF (ref 0–8)
KETONES, URINE: ABNORMAL MG/DL
LEUKOCYTE ESTERASE, URINE: ABNORMAL
LYMPHOCYTES ABSOLUTE: 1.1 K/UL (ref 1–5.1)
LYMPHOCYTES RELATIVE PERCENT: 18.3 %
MCH RBC QN AUTO: 29.5 PG (ref 26–34)
MCHC RBC AUTO-ENTMCNC: 32.7 G/DL (ref 31–36)
MCV RBC AUTO: 90.3 FL (ref 80–100)
MICROSCOPIC EXAMINATION: YES
MONOCYTES ABSOLUTE: 0.3 K/UL (ref 0–1.3)
MONOCYTES RELATIVE PERCENT: 4.9 %
NEUTROPHILS ABSOLUTE: 4.3 K/UL (ref 1.7–7.7)
NEUTROPHILS RELATIVE PERCENT: 75.3 %
NITRITE, URINE: POSITIVE
PDW BLD-RTO: 15.1 % (ref 12.4–15.4)
PH UA: 6 (ref 5–8)
PLATELET # BLD: 187 K/UL (ref 135–450)
PMV BLD AUTO: 9.2 FL (ref 5–10.5)
PROTEIN UA: 30 MG/DL
RBC # BLD: 4.31 M/UL (ref 4–5.2)
RBC UA: 14 /HPF (ref 0–4)
SPECIFIC GRAVITY UA: 1.02 (ref 1–1.03)
URINE REFLEX TO CULTURE: YES
URINE TYPE: ABNORMAL
UROBILINOGEN, URINE: 0.2 E.U./DL
WBC # BLD: 5.8 K/UL (ref 4–11)
WBC UA: 1627 /HPF (ref 0–5)

## 2022-06-29 PROCEDURE — 1123F ACP DISCUSS/DSCN MKR DOCD: CPT | Performed by: FAMILY MEDICINE

## 2022-06-29 PROCEDURE — G8420 CALC BMI NORM PARAMETERS: HCPCS | Performed by: FAMILY MEDICINE

## 2022-06-29 PROCEDURE — 99214 OFFICE O/P EST MOD 30 MIN: CPT | Performed by: FAMILY MEDICINE

## 2022-06-29 PROCEDURE — 1036F TOBACCO NON-USER: CPT | Performed by: FAMILY MEDICINE

## 2022-06-29 PROCEDURE — 81003 URINALYSIS AUTO W/O SCOPE: CPT | Performed by: FAMILY MEDICINE

## 2022-06-29 PROCEDURE — 1090F PRES/ABSN URINE INCON ASSESS: CPT | Performed by: FAMILY MEDICINE

## 2022-06-29 PROCEDURE — G8427 DOCREV CUR MEDS BY ELIG CLIN: HCPCS | Performed by: FAMILY MEDICINE

## 2022-06-29 PROCEDURE — 36415 COLL VENOUS BLD VENIPUNCTURE: CPT | Performed by: FAMILY MEDICINE

## 2022-06-29 RX ORDER — DILTIAZEM HYDROCHLORIDE 240 MG/1
240 CAPSULE, EXTENDED RELEASE ORAL DAILY
Qty: 90 CAPSULE | Refills: 1 | Status: SHIPPED | OUTPATIENT
Start: 2022-06-29

## 2022-06-29 RX ORDER — GABAPENTIN 300 MG/1
300 CAPSULE ORAL NIGHTLY
Qty: 90 CAPSULE | Refills: 1 | Status: SHIPPED | OUTPATIENT
Start: 2022-06-29 | End: 2022-10-05

## 2022-06-29 RX ORDER — HYDROCHLOROTHIAZIDE 25 MG/1
25 TABLET ORAL DAILY
Qty: 90 TABLET | Refills: 1 | Status: SHIPPED | OUTPATIENT
Start: 2022-06-29

## 2022-06-29 ASSESSMENT — PATIENT HEALTH QUESTIONNAIRE - PHQ9
SUM OF ALL RESPONSES TO PHQ QUESTIONS 1-9: 2
SUM OF ALL RESPONSES TO PHQ9 QUESTIONS 1 & 2: 2
SUM OF ALL RESPONSES TO PHQ QUESTIONS 1-9: 2
2. FEELING DOWN, DEPRESSED OR HOPELESS: 1
1. LITTLE INTEREST OR PLEASURE IN DOING THINGS: 1
SUM OF ALL RESPONSES TO PHQ QUESTIONS 1-9: 2
SUM OF ALL RESPONSES TO PHQ QUESTIONS 1-9: 2

## 2022-06-29 ASSESSMENT — ENCOUNTER SYMPTOMS
ABDOMINAL PAIN: 0
SHORTNESS OF BREATH: 0
COUGH: 0
BACK PAIN: 0
NAUSEA: 0

## 2022-06-29 NOTE — PROGRESS NOTES
Derrick Aiken (:  1923) is a 80 y.o. female,established patient, here for evaluation of the following chief complaint(s):  Follow-up, Hypertension, Chronic Kidney Disease, and Other         ASSESSMENT/PLAN:  1. Primary hypertension, chronic  - hydroCHLOROthiazide (HYDRODIURIL) 25 MG tablet; Take 1 tablet by mouth daily Take 1 tablet by mouth daily  Dispense: 90 tablet; Refill: 1  - dilTIAZem (DILACOR XR) 240 MG extended release capsule; Take 1 capsule by mouth daily  Dispense: 90 capsule; Refill: 1    2. Stage 3b chronic kidney disease (HCC)    3. Pain and swelling of lower leg, unspecified laterality  - VL DUP LOWER EXTREMITY VENOUS BILATERAL; Future  - Sedimentation Rate    4. Fatigue, unspecified type  - CBC with Auto Differential  - Comprehensive Metabolic Panel  - Urinalysis with Reflex to Culture  - TSH, Reflex to T4  - Sedimentation Rate    5. Restless legs,chronic  - gabapentin (NEURONTIN) 300 MG capsule; Take 1 capsule by mouth nightly for 30 days. Dispense: 90 capsule; Refill: 1    6. Abdominal distension  - US ABDOMEN COMPLETE; Future    7. At high risk for falls  Fall precautions    On this date 2022 I have spent 30 minutes reviewing previous notes, test results and face to face with the patient discussing the diagnosis and importance of compliance with the treatment plan as well as documenting on the day of the visit. Return in about 4 months (around 10/29/2022) for HTN, RLS.        Lab Results   Component Value Date    WBC 5.8 2022    HGB 12.7 2022    HCT 38.9 2022    MCV 90.3 2022     2022       Lab Results   Component Value Date     (H) 2022    K 5.1 2022     2022    CO2 24 2022    BUN 32 (H) 2022    CREATININE 1.2 2022    GLUCOSE 112 (H) 2022    CALCIUM 9.2 2022    PROT 7.1 2022    LABALBU 4.5 2022    BILITOT 0.4 2022    ALKPHOS 93 2022    AST 20 2022 ALT 20 06/29/2022    LABGLOM 41 (A) 06/29/2022    GFRAA 50 (A) 06/29/2022    AGRATIO 1.7 06/29/2022     Lab Results   Component Value Date    TSHFT4 2.04 02/16/2022     Lab Results   Component Value Date/Time    COLORU Yellow 06/29/2022 01:33 PM    NITRU POSITIVE 06/29/2022 01:33 PM    GLUCOSEU Negative 06/29/2022 01:33 PM    KETUA TRACE 06/29/2022 01:33 PM    UROBILINOGEN 0.2 06/29/2022 01:33 PM    BILIRUBINUR Negative 06/29/2022 01:33 PM       Subjective   SUBJECTIVE/OBJECTIVE:    HISTORY OF PRESENT ILLNESS:  This is a 80 y.o. female here for the following:  Patient Active Problem List    Diagnosis Date Noted    Chronic renal disease, stage III ([facility] [address] [272399] 06/29/2022    RLS (restless legs syndrome) 11/20/2021    Age-related osteoporosis without current pathological fracture 11/20/2021    Insomnia 11/20/2021    DDD (degenerative disc disease), lumbar     Sciatica, left side     Gait disturbance     History of right hip hemiarthroplasty     Primary hypertension       Pain and swelling in b/l legs. Hx of R hip fracture in the past with chronic swelling in R leg after. Now she states she has noticed more swelling in both legs. The legs hurt. She was on Lasix, but kidney function was worse. +Fatigue  HTN- On diltiazem and HCTZ. Diltiazem may cause some swelling. RLS- On Gabapentin  'Sensitivity' in arms about the same    Review of Systems   Constitutional: Positive for fatigue. Negative for diaphoresis and fever. Respiratory: Negative for cough and shortness of breath. Cardiovascular: Positive for leg swelling. Negative for chest pain and palpitations. Gastrointestinal: Positive for abdominal distention. Negative for abdominal pain and nausea. Genitourinary: Negative for difficulty urinating. Musculoskeletal: Negative for back pain. Neurological: Negative for dizziness and headaches.        No Known Allergies  Current Outpatient Medications   Medication Sig Dispense Refill    intended.

## 2022-06-30 LAB
A/G RATIO: 1.7 (ref 1.1–2.2)
ALBUMIN SERPL-MCNC: 4.5 G/DL (ref 3.4–5)
ALP BLD-CCNC: 93 U/L (ref 40–129)
ALT SERPL-CCNC: 20 U/L (ref 10–40)
ANION GAP SERPL CALCULATED.3IONS-SCNC: 16 MMOL/L (ref 3–16)
AST SERPL-CCNC: 20 U/L (ref 15–37)
BILIRUB SERPL-MCNC: 0.4 MG/DL (ref 0–1)
BUN BLDV-MCNC: 32 MG/DL (ref 7–20)
CALCIUM SERPL-MCNC: 9.2 MG/DL (ref 8.3–10.6)
CHLORIDE BLD-SCNC: 107 MMOL/L (ref 99–110)
CO2: 24 MMOL/L (ref 21–32)
CREAT SERPL-MCNC: 1.2 MG/DL (ref 0.6–1.2)
GFR AFRICAN AMERICAN: 50
GFR NON-AFRICAN AMERICAN: 41
GLUCOSE BLD-MCNC: 112 MG/DL (ref 70–99)
POTASSIUM SERPL-SCNC: 5.1 MMOL/L (ref 3.5–5.1)
SEDIMENTATION RATE, ERYTHROCYTE: 22 MM/HR (ref 0–30)
SODIUM BLD-SCNC: 147 MMOL/L (ref 136–145)
TOTAL PROTEIN: 7.1 G/DL (ref 6.4–8.2)

## 2022-07-01 DIAGNOSIS — N39.0 URINARY TRACT INFECTION WITHOUT HEMATURIA, SITE UNSPECIFIED: Primary | ICD-10-CM

## 2022-07-01 RX ORDER — CEPHALEXIN 500 MG/1
500 CAPSULE ORAL 2 TIMES DAILY
Qty: 14 CAPSULE | Refills: 0 | Status: SHIPPED | OUTPATIENT
Start: 2022-07-01 | End: 2022-10-05

## 2022-07-01 ASSESSMENT — ENCOUNTER SYMPTOMS: ABDOMINAL DISTENTION: 1

## 2022-07-02 LAB
ORGANISM: ABNORMAL
URINE CULTURE, ROUTINE: ABNORMAL

## 2022-07-02 NOTE — TELEPHONE ENCOUNTER
Spoke to [facility] about UA results and medication. Sensitivity still pending. Will start Cephalexin for her UTI.  Med sent to [address] [place] [address]

## 2022-07-15 ENCOUNTER — HOSPITAL ENCOUNTER (OUTPATIENT)
Dept: ULTRASOUND IMAGING | Age: 87
Discharge: HOME | End: 2022-07-15
Payer: MEDICARE

## 2022-07-15 DIAGNOSIS — M79.89 PAIN AND SWELLING OF LOWER LEG, UNSPECIFIED LATERALITY: ICD-10-CM

## 2022-07-15 DIAGNOSIS — M79.669 PAIN AND SWELLING OF LOWER LEG, UNSPECIFIED LATERALITY: ICD-10-CM

## 2022-07-15 DIAGNOSIS — R14.0 ABDOMINAL DISTENSION: ICD-10-CM

## 2022-07-15 PROCEDURE — 93970 EXTREMITY STUDY: CPT

## 2022-07-15 PROCEDURE — 76700 US EXAM ABDOM COMPLETE: CPT

## 2022-10-05 ENCOUNTER — OFFICE VISIT (OUTPATIENT)
Dept: INTERNAL MEDICINE CLINIC | Age: 87
End: 2022-10-05
Payer: MEDICARE

## 2022-10-05 VITALS
HEIGHT: 63 IN | OXYGEN SATURATION: 97 % | HEART RATE: 84 BPM | DIASTOLIC BLOOD PRESSURE: 60 MMHG | BODY MASS INDEX: 19 KG/M2 | WEIGHT: 107.2 LBS | SYSTOLIC BLOOD PRESSURE: 150 MMHG

## 2022-10-05 DIAGNOSIS — R25.1 TREMOR: ICD-10-CM

## 2022-10-05 DIAGNOSIS — G25.81 RESTLESS LEGS: ICD-10-CM

## 2022-10-05 DIAGNOSIS — Z23 NEED FOR IMMUNIZATION AGAINST INFLUENZA: ICD-10-CM

## 2022-10-05 DIAGNOSIS — N18.32 STAGE 3B CHRONIC KIDNEY DISEASE (HCC): ICD-10-CM

## 2022-10-05 DIAGNOSIS — I10 PRIMARY HYPERTENSION: Primary | ICD-10-CM

## 2022-10-05 PROCEDURE — 1123F ACP DISCUSS/DSCN MKR DOCD: CPT | Performed by: FAMILY MEDICINE

## 2022-10-05 PROCEDURE — G0008 ADMIN INFLUENZA VIRUS VAC: HCPCS | Performed by: FAMILY MEDICINE

## 2022-10-05 PROCEDURE — 90694 VACC AIIV4 NO PRSRV 0.5ML IM: CPT | Performed by: FAMILY MEDICINE

## 2022-10-05 PROCEDURE — 99214 OFFICE O/P EST MOD 30 MIN: CPT | Performed by: FAMILY MEDICINE

## 2022-10-05 RX ORDER — GABAPENTIN 100 MG/1
100 CAPSULE ORAL NIGHTLY
Qty: 90 CAPSULE | Refills: 1 | Status: SHIPPED | OUTPATIENT
Start: 2022-10-05 | End: 2023-01-03

## 2022-10-05 RX ORDER — CHOLECALCIFEROL (VITAMIN D3) 125 MCG
5 CAPSULE ORAL NIGHTLY
COMMUNITY

## 2022-10-05 ASSESSMENT — ENCOUNTER SYMPTOMS
BACK PAIN: 0
NAUSEA: 0
SHORTNESS OF BREATH: 0
COUGH: 0
ABDOMINAL PAIN: 0

## 2022-10-05 NOTE — PROGRESS NOTES
Marybeth Lux (:  1923) is a 80 y.o. female,established patient, here for evaluation of the following chief complaint(s):  Follow-up (Pt wants flu shot) and Hypertension         ASSESSMENT/PLAN:  1. Primary hypertension  Continue diltiazem and HCTZ    2. Restless legs    -Decrease gabapentin to 100 mg  - gabapentin (NEURONTIN) 100 MG capsule; Take 1 capsule by mouth nightly for 90 days. Dispense: 90 capsule; Refill: 1    3. Stage 3b chronic kidney disease ([facility] [address]  - Basic Metabolic Panel; Future    4. Tremor  Declines further work up at this time    5. Need for immunization against influenza  - Influenza, FLUAD, (age 72 y+), IM, Preservative Free, 0.5 mL    Persist RTO or call  Return in about 6 months (around 2023) for HTN, RLS.        Lab Results   Component Value Date    WBC 5.8 2022    HGB 12.7 2022    HCT 38.9 2022    MCV 90.3 2022     2022       Lab Results   Component Value Date     (H) 2022    K 5.1 2022     2022    CO2 24 2022    BUN 32 (H) 2022    CREATININE 1.2 2022    GLUCOSE 112 (H) 2022    CALCIUM 9.2 2022    PROT 7.1 2022    LABALBU 4.5 2022    BILITOT 0.4 2022    ALKPHOS 93 2022    AST 20 2022    ALT 20 2022    LABGLOM 41 (A) 2022    GFRAA 50 (A) 2022    AGRATIO 1.7 2022     Lab Results   Component Value Date    TSHFT4 2.04 2022     Sed Rate 0 - 30 mm/Hr 22      Vit D, 25-Hydroxy >20 NG/ML 27.22      Lab Results   Component Value Date/Time    COLORU Yellow 2022 01:33 PM    NITRU POSITIVE 2022 01:33 PM    GLUCOSEU Negative 2022 01:33 PM    KETUA TRACE 2022 01:33 PM    UROBILINOGEN 0.2 2022 01:33 PM    BILIRUBINUR Negative 2022 01:33 PM       Subjective   SUBJECTIVE/OBJECTIVE:    HISTORY OF PRESENT ILLNESS:  This is a 80 y.o. female here for the following:  Patient Active Problem List    Diagnosis Date Noted    Chronic renal disease, stage III [facility]) [455085] 06/29/2022    RLS (restless legs syndrome) 11/20/2021    Age-related osteoporosis without current pathological fracture 11/20/2021    Insomnia 11/20/2021    DDD (degenerative disc disease), lumbar     Sciatica, left side     Gait disturbance     History of right hip hemiarthroplasty     Primary hypertension       HTN- BP higher today. On diltiazem and HCTZ  RLS-  on gabapentin 300 mg at night. Patient possibly having some side effects on this dose  She has noticed a slight tremor in her hands. No numbness or weakness  CKD stage 3b    Review of Systems   Constitutional:  Negative for diaphoresis and fever. Respiratory:  Negative for cough and shortness of breath. Cardiovascular:  Negative for chest pain and palpitations. Gastrointestinal:  Negative for abdominal pain and nausea. Genitourinary:  Negative for dysuria. Musculoskeletal:  Negative for back pain. Neurological:  Negative for dizziness and headaches. No Known Allergies  Current Outpatient Medications   Medication Sig Dispense Refill    melatonin 5 MG TABS tablet Take 5 mg by mouth nightly      gabapentin (NEURONTIN) 100 MG capsule Take 1 capsule by mouth nightly for 90 days. 90 capsule 1    hydroCHLOROthiazide (HYDRODIURIL) 25 MG tablet Take 1 tablet by mouth daily Take 1 tablet by mouth daily 90 tablet 1    dilTIAZem (DILACOR XR) 240 MG extended release capsule Take 1 capsule by mouth daily 90 capsule 1    aspirin 81 MG EC tablet Take 1 tablet by mouth daily 30 tablet 3     No current facility-administered medications for this visit. Vitals:    10/05/22 1331   BP: (!) 150/60   Site: Left Upper Arm   Position: Sitting   Cuff Size: Medium Adult   Pulse: 84   SpO2: 97%   Weight: 107 lb 3.2 oz (48.6 kg)   Height: 5' 3\" (1.6 m)     Objective   Physical Exam  Vitals reviewed. Constitutional:       General: She is not in acute distress.   Eyes:      Extraocular Movements: Extraocular movements intact. Cardiovascular:      Rate and Rhythm: Normal rate and regular rhythm. Pulmonary:      Effort: Pulmonary effort is normal. No respiratory distress. Breath sounds: Normal breath sounds. Abdominal:      Palpations: Abdomen is soft. Tenderness: There is no abdominal tenderness. Musculoskeletal:      Cervical back: Neck supple. Right lower leg: Edema present. Left lower leg: Edema present. Neurological:      Mental Status: She is alert. Mental status is at baseline. Gait: Gait abnormal (cane). Psychiatric:         Mood and Affect: Mood normal.              An electronic signature was used to authenticate this note. --Graciela Anderson DO     This dictation was generated by voice recognition computer software. Although all attempts are made to edit the dictation for accuracy, there may be errors in the transcription that are not intended.

## 2022-12-15 DIAGNOSIS — I10 PRIMARY HYPERTENSION: ICD-10-CM

## 2022-12-15 RX ORDER — HYDROCHLOROTHIAZIDE 25 MG/1
TABLET ORAL
Qty: 90 TABLET | Refills: 1 | Status: ON HOLD | OUTPATIENT
Start: 2022-12-15

## 2022-12-15 RX ORDER — DILTIAZEM HYDROCHLORIDE 240 MG/1
CAPSULE, EXTENDED RELEASE ORAL
Qty: 90 CAPSULE | Refills: 1 | Status: ON HOLD | OUTPATIENT
Start: 2022-12-15

## 2022-12-18 ENCOUNTER — HOSPITAL ENCOUNTER (INPATIENT)
Age: 87
LOS: 2 days | Discharge: HOME | DRG: 083 | End: 2022-12-20
Attending: EMERGENCY MEDICINE | Admitting: STUDENT IN AN ORGANIZED HEALTH CARE EDUCATION/TRAINING PROGRAM
Payer: MEDICARE

## 2022-12-18 ENCOUNTER — APPOINTMENT (OUTPATIENT)
Dept: CT IMAGING | Age: 87
DRG: 083 | End: 2022-12-18
Payer: MEDICARE

## 2022-12-18 ENCOUNTER — APPOINTMENT (OUTPATIENT)
Dept: GENERAL RADIOLOGY | Age: 87
DRG: 083 | End: 2022-12-18
Payer: MEDICARE

## 2022-12-18 DIAGNOSIS — S09.90XA INJURY OF HEAD, INITIAL ENCOUNTER: ICD-10-CM

## 2022-12-18 DIAGNOSIS — I60.9 SUBARACHNOID BLEED (HCC): Primary | ICD-10-CM
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
## 2022-12-18 DIAGNOSIS — S01.01XA LACERATION OF SCALP, INITIAL ENCOUNTER: ICD-10-CM

## 2022-12-18 PROBLEM — S06.6X3A: Status: ACTIVE | Noted: 2022-12-18

## 2022-12-18 LAB
ALBUMIN SERPL-MCNC: 4 GM/DL (ref 3.4–5)
ALP BLD-CCNC: 87 IU/L (ref 40–129)
ALT SERPL-CCNC: 26 U/L (ref 10–40)
ANION GAP SERPL CALCULATED.3IONS-SCNC: 11 MMOL/L (ref 4–16)
AST SERPL-CCNC: 20 IU/L (ref 15–37)
BASOPHILS ABSOLUTE: 0.1 K/CU MM
BASOPHILS RELATIVE PERCENT: 0.5 % (ref 0–1)
BILIRUB SERPL-MCNC: 0.3 MG/DL (ref 0–1)
BUN BLDV-MCNC: 31 MG/DL (ref 6–23)
CALCIUM SERPL-MCNC: 9.1 MG/DL (ref 8.3–10.6)
CHLORIDE BLD-SCNC: 104 MMOL/L (ref 99–110)
CO2: 25 MMOL/L (ref 21–32)
CREAT SERPL-MCNC: 1.1 MG/DL (ref 0.6–1.1)
DIFFERENTIAL TYPE: ABNORMAL
EOSINOPHILS ABSOLUTE: 0.1 K/CU MM
EOSINOPHILS RELATIVE PERCENT: 0.4 % (ref 0–3)
GFR SERPL CREATININE-BSD FRML MDRD: 45 ML/MIN/1.73M2
GLUCOSE BLD-MCNC: 109 MG/DL (ref 70–99)
HCT VFR BLD CALC: 37.2 % (ref 37–47)
HEMOGLOBIN: 11.7 GM/DL (ref 12.5–16)
IMMATURE NEUTROPHIL %: 0.4 % (ref 0–0.43)
LYMPHOCYTES ABSOLUTE: 1.4 K/CU MM
LYMPHOCYTES RELATIVE PERCENT: 11 % (ref 24–44)
MCH RBC QN AUTO: 29.9 PG (ref 27–31)
MCHC RBC AUTO-ENTMCNC: 31.5 % (ref 32–36)
MCV RBC AUTO: 95.1 FL (ref 78–100)
MONOCYTES ABSOLUTE: 0.5 K/CU MM
MONOCYTES RELATIVE PERCENT: 3.8 % (ref 0–4)
NUCLEATED RBC %: 0 %
PDW BLD-RTO: 14.1 % (ref 11.7–14.9)
PLATELET # BLD: 198 K/CU MM (ref 140–440)
PMV BLD AUTO: 10.3 FL (ref 7.5–11.1)
POTASSIUM SERPL-SCNC: 5.1 MMOL/L (ref 3.5–5.1)
RBC # BLD: 3.91 M/CU MM (ref 4.2–5.4)
SEGMENTED NEUTROPHILS ABSOLUTE COUNT: 10.4 K/CU MM
SEGMENTED NEUTROPHILS RELATIVE PERCENT: 83.9 % (ref 36–66)
SODIUM BLD-SCNC: 140 MMOL/L (ref 135–145)
TOTAL IMMATURE NEUTOROPHIL: 0.05 K/CU MM
TOTAL NUCLEATED RBC: 0 K/CU MM
TOTAL PROTEIN: 7.1 GM/DL (ref 6.4–8.2)
WBC # BLD: 12.4 K/CU MM (ref 4–10.5)

## 2022-12-18 PROCEDURE — 6370000000 HC RX 637 (ALT 250 FOR IP): Performed by: EMERGENCY MEDICINE

## 2022-12-18 PROCEDURE — 72125 CT NECK SPINE W/O DYE: CPT

## 2022-12-18 PROCEDURE — 2000000000 HC ICU R&B

## 2022-12-18 PROCEDURE — 2500000003 HC RX 250 WO HCPCS: Performed by: INTERNAL MEDICINE

## 2022-12-18 PROCEDURE — 2580000003 HC RX 258: Performed by: STUDENT IN AN ORGANIZED HEALTH CARE EDUCATION/TRAINING PROGRAM

## 2022-12-18 PROCEDURE — 80053 COMPREHEN METABOLIC PANEL: CPT

## 2022-12-18 PROCEDURE — 70450 CT HEAD/BRAIN W/O DYE: CPT

## 2022-12-18 PROCEDURE — 72170 X-RAY EXAM OF PELVIS: CPT

## 2022-12-18 PROCEDURE — 85025 COMPLETE CBC W/AUTO DIFF WBC: CPT

## 2022-12-18 PROCEDURE — 6370000000 HC RX 637 (ALT 250 FOR IP): Performed by: STUDENT IN AN ORGANIZED HEALTH CARE EDUCATION/TRAINING PROGRAM

## 2022-12-18 PROCEDURE — 6370000000 HC RX 637 (ALT 250 FOR IP): Performed by: INTERNAL MEDICINE

## 2022-12-18 PROCEDURE — 12001 RPR S/N/AX/GEN/TRNK 2.5CM/<: CPT

## 2022-12-18 PROCEDURE — 71045 X-RAY EXAM CHEST 1 VIEW: CPT

## 2022-12-18 PROCEDURE — 6360000004 HC RX CONTRAST MEDICATION: Performed by: STUDENT IN AN ORGANIZED HEALTH CARE EDUCATION/TRAINING PROGRAM

## 2022-12-18 PROCEDURE — 99285 EMERGENCY DEPT VISIT HI MDM: CPT

## 2022-12-18 PROCEDURE — 70496 CT ANGIOGRAPHY HEAD: CPT

## 2022-12-18 PROCEDURE — 6360000002 HC RX W HCPCS: Performed by: STUDENT IN AN ORGANIZED HEALTH CARE EDUCATION/TRAINING PROGRAM

## 2022-12-18 RX ORDER — LABETALOL HYDROCHLORIDE 5 MG/ML
5 INJECTION, SOLUTION INTRAVENOUS
Status: DISCONTINUED | OUTPATIENT
Start: 2022-12-18 | End: 2022-12-20 | Stop reason: HOSPADM

## 2022-12-18 RX ORDER — ONDANSETRON 4 MG/1
4 TABLET, ORALLY DISINTEGRATING ORAL EVERY 8 HOURS PRN
Status: DISCONTINUED | OUTPATIENT
Start: 2022-12-18 | End: 2022-12-20 | Stop reason: HOSPADM

## 2022-12-18 RX ORDER — ACETAMINOPHEN 325 MG/1
650 TABLET ORAL EVERY 4 HOURS PRN
Status: DISCONTINUED | OUTPATIENT
Start: 2022-12-18 | End: 2022-12-20 | Stop reason: HOSPADM

## 2022-12-18 RX ORDER — LABETALOL HYDROCHLORIDE 5 MG/ML
10 INJECTION, SOLUTION INTRAVENOUS
Status: DISCONTINUED | OUTPATIENT
Start: 2022-12-18 | End: 2022-12-18

## 2022-12-18 RX ORDER — ACETAMINOPHEN 500 MG
1000 TABLET ORAL ONCE
Status: COMPLETED | OUTPATIENT
Start: 2022-12-18 | End: 2022-12-18

## 2022-12-18 RX ORDER — ONDANSETRON 2 MG/ML
4 INJECTION INTRAMUSCULAR; INTRAVENOUS EVERY 6 HOURS PRN
Status: DISCONTINUED | OUTPATIENT
Start: 2022-12-18 | End: 2022-12-20 | Stop reason: HOSPADM

## 2022-12-18 RX ORDER — ONDANSETRON 4 MG/1
4 TABLET, ORALLY DISINTEGRATING ORAL ONCE
Status: COMPLETED | OUTPATIENT
Start: 2022-12-18 | End: 2022-12-18

## 2022-12-18 RX ORDER — LIDOCAINE HYDROCHLORIDE 10 MG/ML
10 INJECTION, SOLUTION EPIDURAL; INFILTRATION; INTRACAUDAL; PERINEURAL ONCE
Status: DISCONTINUED | OUTPATIENT
Start: 2022-12-18 | End: 2022-12-20 | Stop reason: HOSPADM

## 2022-12-18 RX ORDER — LANOLIN ALCOHOL/MO/W.PET/CERES
3 CREAM (GRAM) TOPICAL NIGHTLY PRN
Status: DISCONTINUED | OUTPATIENT
Start: 2022-12-18 | End: 2022-12-20 | Stop reason: HOSPADM

## 2022-12-18 RX ORDER — MAGNESIUM SULFATE 1 G/100ML
1000 INJECTION INTRAVENOUS PRN
Status: DISCONTINUED | OUTPATIENT
Start: 2022-12-18 | End: 2022-12-20 | Stop reason: HOSPADM

## 2022-12-18 RX ADMIN — LABETALOL HYDROCHLORIDE 5 MG: 5 INJECTION, SOLUTION INTRAVENOUS at 22:55

## 2022-12-18 RX ADMIN — SODIUM CHLORIDE 1000 MG: 9 INJECTION, SOLUTION INTRAVENOUS at 22:56

## 2022-12-18 RX ADMIN — Medication 3 MG: at 22:54

## 2022-12-18 RX ADMIN — ACETAMINOPHEN 650 MG: 325 TABLET ORAL at 22:54

## 2022-12-18 RX ADMIN — ACETAMINOPHEN 1000 MG: 500 TABLET ORAL at 17:03

## 2022-12-18 RX ADMIN — ONDANSETRON 4 MG: 4 TABLET, ORALLY DISINTEGRATING ORAL at 17:03

## 2022-12-18 RX ADMIN — IOPAMIDOL 75 ML: 755 INJECTION, SOLUTION INTRAVENOUS at 20:29

## 2022-12-18 ASSESSMENT — PAIN DESCRIPTION - ORIENTATION: ORIENTATION: ANTERIOR

## 2022-12-18 ASSESSMENT — ENCOUNTER SYMPTOMS
ABDOMINAL DISTENTION: 0
ALLERGIC/IMMUNOLOGIC NEGATIVE: 1
EYE DISCHARGE: 0
BACK PAIN: 0
CONSTIPATION: 0
EYES NEGATIVE: 1
WHEEZING: 0
DIARRHEA: 0
SHORTNESS OF BREATH: 0
SORE THROAT: 0
COUGH: 0
GASTROINTESTINAL NEGATIVE: 1
RESPIRATORY NEGATIVE: 1

## 2022-12-18 ASSESSMENT — PAIN DESCRIPTION - DIRECTION: RADIATING_TOWARDS: BEHIND EYES

## 2022-12-18 ASSESSMENT — PAIN DESCRIPTION - PAIN TYPE: TYPE: ACUTE PAIN

## 2022-12-18 ASSESSMENT — PAIN - FUNCTIONAL ASSESSMENT: PAIN_FUNCTIONAL_ASSESSMENT: ACTIVITIES ARE NOT PREVENTED

## 2022-12-18 ASSESSMENT — PAIN SCALES - GENERAL
PAINLEVEL_OUTOF10: 1
PAINLEVEL_OUTOF10: 0
PAINLEVEL_OUTOF10: 3

## 2022-12-18 ASSESSMENT — PAIN DESCRIPTION - ONSET: ONSET: ON-GOING

## 2022-12-18 ASSESSMENT — PAIN DESCRIPTION - DESCRIPTORS: DESCRIPTORS: ACHING;DISCOMFORT

## 2022-12-18 ASSESSMENT — PAIN DESCRIPTION - FREQUENCY: FREQUENCY: CONTINUOUS

## 2022-12-18 ASSESSMENT — PAIN DESCRIPTION - LOCATION: LOCATION: HEAD;EYE

## 2022-12-18 NOTE — ED PROVIDER NOTES
[facility]      TRIAGE CHIEF COMPLAINT:   Fall (Lac to back of the head, no LOC, no blood thinners) and Head Laceration      Nikolai:  Gaynell Bence is a 80 y.o. female that presents with complaint of fall, head injury. Patient was on a stool at home she lives independently and she slipped and fell backwards hit the back of her head has a wound, brought in by EMS she passed out for a bit woke up and called 911. She has a slight headache otherwise denies any fevers nausea vomiting chest pain shortness of breath arm leg pain neck pain back pain no other questions or concerns. He denies blood thinners. .  Just on aspirin    REVIEW OF SYSTEMS:  At least 10 systems reviewed and otherwise acutely negative except as in the [address] Review of Systems   Constitutional: Negative. HENT: Negative. Eyes: Negative. Respiratory: Negative. Cardiovascular: Negative. Gastrointestinal: Negative. Endocrine: Negative. Genitourinary: Negative. Musculoskeletal: Negative. Skin:  Positive for wound. Allergic/Immunologic: Negative. Neurological:  Positive for headaches. Hematological: Negative. Psychiatric/Behavioral: Negative. All other systems reviewed and are negative. Past Medical History:   Diagnosis Date    Acute blood loss anemia     Age-related osteoporosis without current pathological fracture     Closed displaced fracture of right femoral neck (HCC) 02/28/2021    Generalized weakness     Insomnia     Neuropathy     Obstipation     Pedal edema     Primary hypertension     RLS (restless legs syndrome)      Past Surgical History:   Procedure Laterality Date    APPENDECTOMY      CHOLECYSTECTOMY      HIP SURGERY Right 02/23/2021    HIP HEMIARTHROPLASTY RIGHT performed by Camila Mckinnon MD at [place] [address] ([address] [place] [address])       History reviewed. No pertinent family history. Social History     Socioeconomic History    Marital status:       Spouse name: Not on file    Number of children: Not on file    Years of education: Not on file    Highest education level: Not on file   Occupational History    Not on file   Tobacco Use    Smoking status: Never    Smokeless tobacco: Never   Substance and Sexual Activity    Alcohol use: Never    Drug use: Never    Sexual activity: Not on file   Other Topics Concern    Not on file   Social History Narrative    Not on file     Social Determinants of Health     Financial Resource Strain: Not on file   Food Insecurity: Not on file   Transportation Needs: Not on file   Physical Activity: Not on file   Stress: Not on file   Social Connections: Not on file   Intimate Partner Violence: Not on file   Housing Stability: Not on file     Current Facility-Administered Medications   Medication Dose Route Frequency Provider Last Rate Last Admin    lidocaine PF 1 % injection 10 mL  10 mL IntraDERmal Once Cliff Scott DO        acetaminophen (TYLENOL) tablet 650 mg  650 mg Oral Q4H PRN Reyes Latham MD        ondansetron (ZOFRAN-ODT) disintegrating tablet 4 mg  4 mg Oral Q8H PRN Reyes Latham MD        Or    ondansetron (ZOFRAN) injection 4 mg  4 mg IntraVENous Q6H PRN Reyes Latham MD        levETIRAcetam (KEPPRA) 1,000 mg in sodium chloride 0.9 % 100 mL IVPB  1,000 mg IntraVENous Once Reyes Latham MD        [START ON 12/19/2022] levETIRAcetam (KEPPRA) 500 mg in sodium chloride 0.9 % 100 mL IVPB  500 mg IntraVENous Q12H Reyes Latham MD        magnesium sulfate 1000 mg in dextrose 5% 100 mL IVPB  1,000 mg IntraVENous PRN Reyes Latham MD         Current Outpatient Medications   Medication Sig Dispense Refill    DILT- MG extended release capsule TAKE 1 CAPSULE BY MOUTH  DAILY 90 capsule 1    hydroCHLOROthiazide (HYDRODIURIL) 25 MG tablet TAKE 1 TABLET BY MOUTH  DAILY 90 tablet 1    melatonin 5 MG TABS tablet Take 5 mg by mouth nightly      gabapentin (NEURONTIN) 100 MG capsule Take 1 capsule by mouth nightly for 90 days.  90 capsule 1 aspirin 81 MG EC tablet Take 1 tablet by mouth daily 30 tablet 3      No Known Allergies  Current Facility-Administered Medications   Medication Dose Route Frequency Provider Last Rate Last Admin    lidocaine PF 1 % injection 10 mL  10 mL IntraDERmal Once Redu.us, DO        acetaminophen (TYLENOL) tablet 650 mg  650 mg Oral Q4H PRN Clary Lino MD        ondansetron (ZOFRAN-ODT) disintegrating tablet 4 mg  4 mg Oral Q8H PRN Clary Lnio MD        Or    ondansetron (ZOFRAN) injection 4 mg  4 mg IntraVENous Q6H PRN Clary Lino MD        levETIRAcetam (KEPPRA) 1,000 mg in sodium chloride 0.9 % 100 mL IVPB  1,000 mg IntraVENous Once MD Miriam Perry ON 12/19/2022] levETIRAcetam (KEPPRA) 500 mg in sodium chloride 0.9 % 100 mL IVPB  500 mg IntraVENous Q12H Clary Lino MD        magnesium sulfate 1000 mg in dextrose 5% 100 mL IVPB  1,000 mg IntraVENous PRN Clary Lino MD         Current Outpatient Medications   Medication Sig Dispense Refill    DILT- MG extended release capsule TAKE 1 CAPSULE BY MOUTH  DAILY 90 capsule 1    hydroCHLOROthiazide (HYDRODIURIL) 25 MG tablet TAKE 1 TABLET BY MOUTH  DAILY 90 tablet 1    melatonin 5 MG TABS tablet Take 5 mg by mouth nightly      gabapentin (NEURONTIN) 100 MG capsule Take 1 capsule by mouth nightly for 90 days. 90 capsule 1    aspirin 81 MG EC tablet Take 1 tablet by mouth daily 30 tablet 3       Nursing Notes Reviewed    VITAL SIGNS:  ED Triage Vitals [12/18/22 1613]   Enc Vitals Group      BP (!) 149/71      Heart Rate 83      Resp 16      Temp 98.4 °F (36.9 °C)      Temp Source Oral      SpO2 96 %      Weight 104 lb (47.2 kg)      Height 5' 2.5\" (1.588 m)      Head Circumference       Peak Flow       Pain Score       Pain Loc       Pain Edu? Excl. in [address]? PHYSICAL EXAM:  Physical Exam  Vitals and nursing note reviewed. Constitutional:       General: She is not in acute distress. Appearance: Normal appearance.  She is well-developed and well-groomed. She is not ill-appearing, toxic-appearing or diaphoretic. HENT:      Head: Normocephalic. Laceration present. Right Ear: External ear normal.      Left Ear: External ear normal.      Nose: No congestion. Eyes:      General: No scleral icterus. Right eye: No discharge. Left eye: No discharge. Extraocular Movements: Extraocular movements intact. Conjunctiva/sclera: Conjunctivae normal.      Pupils: Pupils are equal, round, and reactive to light. Cardiovascular:      Rate and Rhythm: Normal rate and regular rhythm. Pulses: Normal pulses. Heart sounds: Normal heart sounds. No murmur heard. No friction rub. No gallop. Pulmonary:      Effort: Pulmonary effort is normal. No respiratory distress. Breath sounds: Normal breath sounds. No stridor. No wheezing, rhonchi or rales. Abdominal:      General: Bowel sounds are normal. There is no distension. Palpations: Abdomen is soft. There is no mass. Tenderness: There is no abdominal tenderness. There is no guarding or rebound. Negative signs include Doll's sign and McBurney's sign. Hernia: No hernia is present. Musculoskeletal:         General: Tenderness and signs of injury present. No swelling or deformity. Normal range of motion. Cervical back: No rigidity or tenderness. Right lower leg: No edema. Left lower leg: No edema. Skin:     General: Skin is warm. Coloration: Skin is not jaundiced or pale. Findings: No bruising, erythema, lesion or rash. Neurological:      General: No focal deficit present. Mental Status: She is alert and oriented to person, place, and time. Cranial Nerves: No cranial nerve deficit. Sensory: No sensory deficit. Motor: No weakness. Coordination: Coordination normal.   Psychiatric:         Mood and Affect: Mood normal.         Behavior: Behavior normal. Behavior is cooperative. Thought Content:  Thought content normal.         Judgment: Judgment normal.         I have reviewed andinterpreted all of the currently available lab results from this visit (if applicable):    Results for orders placed or performed during the hospital encounter of 12/18/22   CBC with Auto Differential   Result Value Ref Range    WBC 12.4 (H) 4.0 - 10.5 K/CU MM    RBC 3.91 (L) 4.2 - 5.4 M/CU MM    Hemoglobin 11.7 (L) 12.5 - 16.0 GM/DL    Hematocrit 37.2 37 - 47 %    MCV 95.1 78 - 100 FL    MCH 29.9 27 - 31 PG    MCHC 31.5 (L) 32.0 - 36.0 %    RDW 14.1 11.7 - 14.9 %    Platelets 425 165 - 094 K/CU MM    MPV 10.3 7.5 - 11.1 FL    Differential Type AUTOMATED DIFFERENTIAL     Segs Relative 83.9 (H) 36 - 66 %    Lymphocytes % 11.0 (L) 24 - 44 %    Monocytes % 3.8 0 - 4 %    Eosinophils % 0.4 0 - 3 %    Basophils % 0.5 0 - 1 %    Segs Absolute 10.4 K/CU MM    Lymphocytes Absolute 1.4 K/CU MM    Monocytes Absolute 0.5 K/CU MM    Eosinophils Absolute 0.1 K/CU MM    Basophils Absolute 0.1 K/CU MM    Nucleated RBC % 0.0 %    Total Nucleated RBC 0.0 K/CU MM    Total Immature Neutrophil 0.05 K/CU MM    Immature Neutrophil % 0.4 0 - 0.43 %   Comprehensive Metabolic Panel   Result Value Ref Range    Sodium 140 135 - 145 MMOL/L    Potassium 5.1 3.5 - 5.1 MMOL/L    Chloride 104 99 - 110 mMol/L    CO2 25 21 - 32 MMOL/L    BUN 31 (H) 6 - 23 MG/DL    Creatinine 1.1 0.6 - 1.1 MG/DL    Est, Glom Filt Rate 45 (L) >60 mL/min/1.73m2    Glucose 109 (H) 70 - 99 MG/DL    Calcium 9.1 8.3 - 10.6 MG/DL    Albumin 4.0 3.4 - 5.0 GM/DL    Total Protein 7.1 6.4 - 8.2 GM/DL    Total Bilirubin 0.3 0.0 - 1.0 MG/DL    ALT 26 10 - 40 U/L    AST 20 15 - 37 IU/L    Alkaline Phosphatase 87 40 - 129 IU/L    Anion Gap 11 4 - 16        Radiographs (if obtained):  [] The following radiograph was interpreted by myself in the absence of a radiologist:  [x] Radiologist's Report Reviewed:    CT Brain, C spine, CXR, Pelvis    EKG (if obtained): (All EKG's are interpreted by myself in the absence of a cardiologist)    MDM:    Patient arrives by EMS after fall at home. Again she lives at home independently she is on aspirin no other blood thinners. She is on a stool and she slipped and fell mechanical fall fell backwards hit the back of her head and passed out for a bit. Woke up and called 911 on her device. She denies other question concerns she has no neck back pain other extremity pain. No chest pain shortness of breath no abdominal pain no hip pain no new leg pain or arm pain. Does have a wound to the back of her head. On aspirin. We will give her tetanus shot will give her pain nausea medicine. We will get x-rays of chest and pelvis we will do head CT and C-spine. Otherwise she is ANO x3. Patient does have a small subarachnoid hemorrhage. Laceration was repaired by NP please see her procedure note. Patient otherwise well-appearing I did order lab work. Talk to neurosurgery here we will keep here for observation I did talk to ICU physician will admit neurosurgery recommended every 6 hour head CT's. Again she is on aspirin GCS of 15 she is given tetanus shot. Patient otherwise stable admitted. Okay with plan. CLINICAL IMPRESSION:  Final diagnoses:   Fall, initial encounter   Laceration of scalp, initial encounter   Injury of head, initial encounter   Subarachnoid bleed ([facility] [address]       (Please note that portions of this note may have been completed with a voice recognition program. Efforts were made to edit the dictations but occasionally words aremis-transcribed.)    DISPOSITION REFERRAL (if applicable):  No follow-up provider specified.     DISPOSITION MEDICATIONS (if applicable):  New Prescriptions    No medications on file          Leidy Gonzalez, [address] [facility], DO  12/18/22 3603

## 2022-12-18 NOTE — ED NOTES
1803 paged Dr Marisela Phipps  12/18/22 1806    1809 Dr Nakul Miles returned call      [facility]  12/18/22 1803

## 2022-12-18 NOTE — ED TRIAGE NOTES
Pt arrived to the ED with complaints of a fall and head injury that happened today. Pt states she fell off a stool and hit her head. Pt does not know what she hit her head on. Pt denies any other injuries. Pt denies blood thinners and LOC.

## 2022-12-19 ENCOUNTER — APPOINTMENT (OUTPATIENT)
Dept: CT IMAGING | Age: 87
DRG: 083 | End: 2022-12-19
Payer: MEDICARE

## 2022-12-19 LAB
ANION GAP SERPL CALCULATED.3IONS-SCNC: 12 MMOL/L (ref 4–16)
BUN BLDV-MCNC: 26 MG/DL (ref 6–23)
CALCIUM SERPL-MCNC: 8.4 MG/DL (ref 8.3–10.6)
CHLORIDE BLD-SCNC: 107 MMOL/L (ref 99–110)
CO2: 23 MMOL/L (ref 21–32)
CREAT SERPL-MCNC: 0.9 MG/DL (ref 0.6–1.1)
GFR SERPL CREATININE-BSD FRML MDRD: 57 ML/MIN/1.73M2
GLUCOSE BLD-MCNC: 89 MG/DL (ref 70–99)
MAGNESIUM: 2.2 MG/DL (ref 1.8–2.4)
MAGNESIUM: 2.2 MG/DL (ref 1.8–2.4)
POTASSIUM SERPL-SCNC: 4.9 MMOL/L (ref 3.5–5.1)
SODIUM BLD-SCNC: 142 MMOL/L (ref 135–145)

## 2022-12-19 PROCEDURE — 36415 COLL VENOUS BLD VENIPUNCTURE: CPT

## 2022-12-19 PROCEDURE — 2500000003 HC RX 250 WO HCPCS: Performed by: INTERNAL MEDICINE

## 2022-12-19 PROCEDURE — 2000000000 HC ICU R&B

## 2022-12-19 PROCEDURE — 94761 N-INVAS EAR/PLS OXIMETRY MLT: CPT

## 2022-12-19 PROCEDURE — 70450 CT HEAD/BRAIN W/O DYE: CPT

## 2022-12-19 PROCEDURE — 97166 OT EVAL MOD COMPLEX 45 MIN: CPT

## 2022-12-19 PROCEDURE — 97116 GAIT TRAINING THERAPY: CPT

## 2022-12-19 PROCEDURE — 6370000000 HC RX 637 (ALT 250 FOR IP): Performed by: NURSE PRACTITIONER

## 2022-12-19 PROCEDURE — 83735 ASSAY OF MAGNESIUM: CPT

## 2022-12-19 PROCEDURE — 97530 THERAPEUTIC ACTIVITIES: CPT

## 2022-12-19 PROCEDURE — 2580000003 HC RX 258: Performed by: STUDENT IN AN ORGANIZED HEALTH CARE EDUCATION/TRAINING PROGRAM

## 2022-12-19 PROCEDURE — 80048 BASIC METABOLIC PNL TOTAL CA: CPT

## 2022-12-19 PROCEDURE — 6370000000 HC RX 637 (ALT 250 FOR IP): Performed by: STUDENT IN AN ORGANIZED HEALTH CARE EDUCATION/TRAINING PROGRAM

## 2022-12-19 PROCEDURE — 92610 EVALUATE SWALLOWING FUNCTION: CPT

## 2022-12-19 PROCEDURE — 6360000002 HC RX W HCPCS: Performed by: STUDENT IN AN ORGANIZED HEALTH CARE EDUCATION/TRAINING PROGRAM

## 2022-12-19 PROCEDURE — 97162 PT EVAL MOD COMPLEX 30 MIN: CPT

## 2022-12-19 RX ORDER — HYDROCHLOROTHIAZIDE 25 MG/1
25 TABLET ORAL DAILY
Status: DISCONTINUED | OUTPATIENT
Start: 2022-12-19 | End: 2022-12-20 | Stop reason: HOSPADM

## 2022-12-19 RX ORDER — GABAPENTIN 100 MG/1
100 CAPSULE ORAL NIGHTLY
Status: DISCONTINUED | OUTPATIENT
Start: 2022-12-19 | End: 2022-12-20 | Stop reason: HOSPADM

## 2022-12-19 RX ORDER — DILTIAZEM HYDROCHLORIDE 120 MG/1
240 CAPSULE, COATED, EXTENDED RELEASE ORAL DAILY
Status: DISCONTINUED | OUTPATIENT
Start: 2022-12-19 | End: 2022-12-20 | Stop reason: HOSPADM

## 2022-12-19 RX ORDER — CHOLECALCIFEROL (VITAMIN D3) 125 MCG
5 CAPSULE ORAL NIGHTLY
Status: DISCONTINUED | OUTPATIENT
Start: 2022-12-19 | End: 2022-12-20 | Stop reason: HOSPADM

## 2022-12-19 RX ADMIN — HYDROCHLOROTHIAZIDE 25 MG: 25 TABLET ORAL at 16:12

## 2022-12-19 RX ADMIN — DILTIAZEM HYDROCHLORIDE 240 MG: 120 CAPSULE, COATED, EXTENDED RELEASE ORAL at 16:12

## 2022-12-19 RX ADMIN — Medication 5 MG: at 19:59

## 2022-12-19 RX ADMIN — GABAPENTIN 100 MG: 100 CAPSULE ORAL at 20:00

## 2022-12-19 RX ADMIN — LABETALOL HYDROCHLORIDE 5 MG: 5 INJECTION, SOLUTION INTRAVENOUS at 10:13

## 2022-12-19 RX ADMIN — LABETALOL HYDROCHLORIDE 5 MG: 5 INJECTION, SOLUTION INTRAVENOUS at 20:00

## 2022-12-19 RX ADMIN — SODIUM CHLORIDE 500 MG: 9 INJECTION, SOLUTION INTRAVENOUS at 09:42

## 2022-12-19 RX ADMIN — ACETAMINOPHEN 650 MG: 325 TABLET ORAL at 19:59

## 2022-12-19 RX ADMIN — LABETALOL HYDROCHLORIDE 5 MG: 5 INJECTION, SOLUTION INTRAVENOUS at 18:18

## 2022-12-19 ASSESSMENT — LIFESTYLE VARIABLES
HOW OFTEN DO YOU HAVE A DRINK CONTAINING ALCOHOL: NEVER
HOW MANY STANDARD DRINKS CONTAINING ALCOHOL DO YOU HAVE ON A TYPICAL DAY: PATIENT DOES NOT DRINK

## 2022-12-19 ASSESSMENT — PAIN - FUNCTIONAL ASSESSMENT: PAIN_FUNCTIONAL_ASSESSMENT: ACTIVITIES ARE NOT PREVENTED

## 2022-12-19 ASSESSMENT — PAIN SCALES - GENERAL
PAINLEVEL_OUTOF10: 0
PAINLEVEL_OUTOF10: 8
PAINLEVEL_OUTOF10: 0
PAINLEVEL_OUTOF10: 0

## 2022-12-19 ASSESSMENT — PAIN DESCRIPTION - PAIN TYPE: TYPE: ACUTE PAIN

## 2022-12-19 ASSESSMENT — PAIN DESCRIPTION - DIRECTION: RADIATING_TOWARDS: NO WHERE

## 2022-12-19 ASSESSMENT — PAIN DESCRIPTION - FREQUENCY: FREQUENCY: CONTINUOUS

## 2022-12-19 ASSESSMENT — PAIN DESCRIPTION - DESCRIPTORS: DESCRIPTORS: DISCOMFORT;ACHING

## 2022-12-19 ASSESSMENT — PAIN DESCRIPTION - ORIENTATION: ORIENTATION: MID

## 2022-12-19 ASSESSMENT — PAIN DESCRIPTION - LOCATION: LOCATION: EYE;HEAD

## 2022-12-19 ASSESSMENT — PAIN DESCRIPTION - ONSET: ONSET: ON-GOING

## 2022-12-19 NOTE — PROGRESS NOTES
Speech Language Pathology  Facility/Department: [facility]   CLINICAL BEDSIDE SWALLOW EVALUATION    NAME: Josr Blanton  : 1923  MRN: [id number]    IMPRESSIONS AND RECOMMENDATIONS: Josr Blanton was referred for a bedside swallow evaluation following admission to [facility] s/p fall with traumatic subarachnoid hemorrhage. Per radiologist, head CT report states \"Mild amount of subarachnoid blood noted in the left frontoparietal region. This is not associated with any mass-effect or parenchymal hemorrhage. 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 \" Medical hx includes HTN. No known history of dysphagia prior to admission. Pt seen for evaluation seated upright in bed, alert, cooperative. Oral mechanism examination WFL without asymmetry. Pt presented with PO trials of ice chips, thin liquids via tsp/cup/straw, puree, and regular solids. Oral stage WFL with intact labial seal, slow/adequate mastication, intact oral clearance. Pharyngeal stage appears WFL/age-appropriate without s/s aspiration. Speech/language screened and judged intact. Recommend initiation of regular diet/thin liquids. No further acute SLP needs identified. ADMISSION DATE: 2022  ADMITTING DIAGNOSIS: has Gait disturbance; History of right hip hemiarthroplasty; Primary hypertension; DDD (degenerative disc disease), lumbar; Sciatica, left side; RLS (restless legs syndrome); Age-related osteoporosis without current pathological fracture;  Insomnia; Chronic renal disease, stage III ([address] [131302]; and Traumatic subarachnoid bleed with LOC of 1 hour to 5 hours 59 minutes, initial encounter ([address] on their problem list.  ONSET DATE: this admission    Recent Chest Xray/CT of Chest: see chart    Date of Eval: 2022  Evaluating Therapist: LUCRECIA Keating    Current Diet level:  Current Diet : NPO      Primary Complaint  Patient Complaint: denies speech/swallowing complaints    Pain:  Pain Assessment  Pain Assessment: 0-10  Pain Level: 0  Patient's Stated Pain Goal: 0 - No pain  Pain Location: Head, Eye  Pain Orientation: Anterior  Pain Descriptors: Aching, Discomfort  Functional Pain Assessment: Activities are not prevented  Pain Type: Acute pain  Pain Radiating Towards: behind eyes  Pain Frequency: Continuous  Pain Onset: On-going  Non-Pharmaceutical Pain Intervention(s): Emotional support  Response to Pain Intervention: Patient satisfied  Side Effects: No reported side effects    Reason for Referral  Cady Amezcua was referred for a bedside swallow evaluation to assess the efficiency of her swallow function, identify signs and symptoms of aspiration and make recommendations regarding safe dietary consistencies, effective compensatory strategies, and safe eating environment. Impression  Dysphagia Diagnosis: Swallow function appears WFL;No clinical indicators of dysphagia  Dysphagia Outcome Severity Scale: Level 6: Within functional limits/Modified independence     Treatment Plan  Requires SLP Intervention: No  Duration of Treatment: N/A  D/C Recommendations: No follow up therapy recommended post discharge       Recommended Diet and Intervention        Recommended Form of Meds: PO          Compensatory Swallowing Strategies  Compensatory Swallowing Strategies : Upright as possible for all oral intake    Treatment/Goals  Short-term Goals  Timeframe for Short-term Goals: N/A    General  Chart Reviewed: Yes  Behavior/Cognition: Alert; Cooperative  Respiratory Status: Room air  O2 Device: None (Room air)  Communication Observation: Functional  Follows Directions: Simple  Dentition: Adequate  Patient Positioning: Upright in bed  Baseline Vocal Quality: Normal  Prior Dysphagia History: none known prior to admission  Consistencies Administered: Regular;Pureed; Thin - cup; Thin - straw; Thin - teaspoon; Ice Chips           Vision/Hearing  Vision  Vision: Within Functional Limits  Hearing  Hearing: Within functional limits    Oral Motor Deficits  Oral/Motor  Oral Hygiene: Moist;Clean    Oral Phase Dysfunction  Oral Phase  Oral Phase: WFL     Indicators of Pharyngeal Phase Dysfunction        Prognosis  Individuals consulted  Consulted and agree with results and recommendations: Patient;RN    Education  Patient Education: results, recommendations  Patient Education Response: Verbalizes understanding  Safety Devices in place: Yes  Type of devices: All fall risk precautions in place; Left in bed       Therapy Time  SLP Individual Minutes  Time In: 9702  Time Out: 4109  Minutes: [facility]-SLP  12/19/2022 10:30 AM

## 2022-12-19 NOTE — CONSULTS
HPI: Patient is a 80-year-old female who presented to the ED with a traumatic subarachnoid hemorrhage. Patient states around 2 PM yesterday she was standing on a stool which fell causing the patient to fall backwards and hit her head. This fall was unwitnessed. Questionable history of loss of consciousness as patient states she does not feel like she lost consciousness. Patient did stay on the floor for an unrecorded amount of time. She was able to get up by her own power. Patient noticed bleeding from her head. Bleeding did not stop after a few minutes, therefore she called EMS. Upon arrival to the ED patient was hemodynamically stable. No neurodeficits. Lab work was unremarkable. 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 Patient was admitted for further evaluation. At bedside this AM she denies headaches, vision changes, nausea, vomiting. Physical Exam:  Vitals: stable  General: No acute distress. HEENT: normocephalic, PERRLA, EOMI  Skin: laceration noted to the posterior scalp, approximately 3 cm, status post closure with staples in the ED. Covered with bandage. Dressing clean, dry and intact. Neurologic: alert and oriented x3, CN II-XII intact, speech clear and coherent, no facial droop, follows commands briskly, sensation intact in all extremities. No pronator drift. MSK: No tenderness to palpation on midline of the cervical spine. Sensation: intact in bilateral upper and lower extremities. Motor: Bilateral upper extremities-  5/5  bilateral deltoids, 5/5 bilateral biceps, 5/5 bilateral triceps, 5/5 bilateral finger , 5/5 bilateral finger abduction. Bilateral lower extremities- 5/5   Bilateral iliopsoas, 5/5 bilateral quadriceps, 5/5 bilateral hamstrings, 5/5 bilateral anterior tibialis, 5/5 bilateral gastrocnemius. Radiology review:    CTA head and neck with contrast, 2028, 12/18/2022  Impression   1.   No focal

## 2022-12-19 NOTE — PROGRESS NOTES
22 1140   Encounter Summary   Encounter Overview/Reason  Initial Encounter   Service Provided For: Patient   Referral/Consult From: [address] Family members   Last Encounter  22  (Spiritual care visit and prayer; patient just received bath and sitting in chair; good conversation and humor displayed.)   Complexity of Encounter Low   Begin Time 1055   End Time  1105   Total Time Calculated 10 min   Encounter    Type Initial Screen/Assessment   Spiritual/Emotional needs   Type Spiritual Support   Assessment/Intervention/Outcome   Assessment Calm;Coping; Hopeful  (All relatives are  except for niece that visits. Patient is woman of gifty.   She does not belong to a local [employer].)   Intervention Active listening;Empowerment;Nurtured Hope;Prayer (assurance of)/[place];Read/Provided Scripture;Sustaining Presence/Ministry of presence   Outcome Comfort;Coping;Encouraged;Engaged in conversation;Expressed feelings, needs, and concerns;Expressed feelings of Marti, Peace and/or Love;Expressed Gratitude   Plan and Referrals   Plan/Referrals Continue to visit, (comment)

## 2022-12-19 NOTE — H&P
V2.0  History and Physical      Name:  Josr Blanton /Age/Sex: 1923  (80 y.o. female)   MRN & CSN:  [id number] & [id number] Encounter Date/Time: 2022 7:58 PM EST   Location:   PCP: Wendie Crisostomo Day: 1    Assessment and Plan: Josr Blanton is a 80 y.o. female with a pmh of hypertension who presents with Traumatic subarachnoid bleed with LOC of 1 hour to 5 hours 59 minutes, initial encounter [facility])    Hospital Problems             Last Modified POA    * (Principal) Traumatic subarachnoid bleed with LOC of 1 hour to 5 hours 59 minutes, initial encounter [facility] 2022 Yes       Traumatic subarachnoid bleed  -Patient slipped and fell and hit her head around 2 PM on   -Questionable history of loss of consciousness  -Hemodynamically stable and cognitively intact on arrival in ED  -CT head notable for mild subarachnoid hemorrhage  -Patient admitted to ICU for observation  -Neurochecks as per ICU protocol  -Keppra for stroke prevention  -Repeat CT head tomorrow a.m. or for any change in mental status    Hypertension  -Target SBP <160  -Management as per ICU    CODE STATUS  Patient has a living will which mentions her niece Homero Toth as POA and her advance care directives confirmed as DNR CC Arrest.     Disposition:   Current Living situation: Assisted living  Expected Disposition: Assisted living  Estimated D/C: TBD    Diet Diet NPO   DVT Prophylaxis [] Lovenox, []  Heparin, [x] SCDs, [] Ambulation,  [] Eliquis, [] Xarelto   Code Status DNR-CCA   Surrogate Decision Maker/ POA Chase Toth     History from:     patient, electronic medical record    History of Present Illness:     Chief Complaint: Traumatic subarachnoid bleed with LOC of 1 hour to 5 hours 59 minutes, initial encounter ([facility]  Josr Blanton is a 80 y.o. female  who presents with traumatic subarachnoid hemorrhage.   Patient states around 2 PM she was standing on a stool which gave way and patient fell backwards and hit her head. Questionable history of loss of consciousness as patient states she was definitely stunned but does not feel like she lost consciousness. Patient states she stayed on the floor for a while and was able to pick herself up eventually. Patient noticed bleeding coming from her head. Failure of bleeding to stop made the patient seek medical attention. EMS was called who brought the patient to ED. In ED patient was noted to be hemodynamically stable and cognitively intact. Lab work is unremarkable. Imaging shows mild frontoparietal subarachnoid hemorrhage and old stroke. Telemetry ICU was consulted and patient was accepted for admission to ICU for monitoring. Patient seen and evaluated at bedside. Pleasant elderly lady. Complains of mild pain at the site of injury. Denies any other active complaints. Vitals, labs and imaging reviewed   Review of Systems: Need 10 Elements   Review of Systems   Constitutional:  Negative for activity change, appetite change, diaphoresis and fatigue. HENT:  Negative for congestion and sore throat. Eyes:  Negative for discharge and visual disturbance. Respiratory:  Negative for cough, shortness of breath and wheezing. Cardiovascular:  Negative for chest pain, palpitations and leg swelling. Gastrointestinal:  Negative for abdominal distention, constipation and diarrhea. Genitourinary:  Negative for difficulty urinating and hematuria. Musculoskeletal:  Negative for arthralgias, back pain and gait problem. Neurological:  Positive for headaches. Negative for dizziness, syncope and speech difficulty. Hematological:  Negative for adenopathy. Psychiatric/Behavioral:  Negative for agitation and confusion. All other systems reviewed and are negative.     Objective:   No intake or output data in the 24 hours ending 12/18/22 1958   Vitals:   Vitals:    12/18/22 1613 12/18/22 1633 12/18/22 1807 12/18/22 1832   BP: (!) 149/71  (!) 153/75 126/72   Pulse: 83 79 74 71   Resp: 16 26 21   Temp: 98.4 °F (36.9 °C)  98.4 °F (36.9 °C)    TempSrc: Oral      SpO2: 96%  96% 96%   Weight: 104 lb (47.2 kg)      Height: 5' 2.5\" (1.588 m)          Medications Prior to Admission     Prior to Admission medications    Medication Sig Start Date End Date Taking? Authorizing Provider   DILT- MG extended release capsule TAKE 1 CAPSULE BY MOUTH  DAILY 12/15/22   Merrianne Ion, DO   hydroCHLOROthiazide (HYDRODIURIL) 25 MG tablet TAKE 1 TABLET BY MOUTH  DAILY 12/15/22   Merrianne Ion, DO   melatonin 5 MG TABS tablet Take 5 mg by mouth nightly    Historical Provider, MD   gabapentin (NEURONTIN) 100 MG capsule Take 1 capsule by mouth nightly for 90 days. 10/5/22 1/3/23  Merrianne Ion, DO   aspirin 81 MG EC tablet Take 1 tablet by mouth daily 3/17/21   JOSELUIS Gorman MD       Physical Exam: Need 8 Elements   Physical Exam  Vitals and nursing note reviewed. Constitutional:       General: She is not in acute distress. Appearance: Normal appearance. She is not ill-appearing. HENT:      Head: Normocephalic and atraumatic. Nose: Nose normal.      Mouth/Throat:      Mouth: Mucous membranes are moist.   Eyes:      Extraocular Movements: Extraocular movements intact. Pupils: Pupils are equal, round, and reactive to light. Cardiovascular:      Rate and Rhythm: Normal rate and regular rhythm. Pulses: Normal pulses. Heart sounds: Normal heart sounds. Pulmonary:      Effort: Pulmonary effort is normal.      Breath sounds: Normal breath sounds. Abdominal:      Palpations: Abdomen is soft. Musculoskeletal:         General: Signs of injury present. Normal range of motion. Cervical back: Normal range of motion. Comments: Occipital skin laceration wound, ~3cm, s/p closure with staples in ED. Covered with bandage. Dressing C/D/I   Skin:     General: Skin is warm. Capillary Refill: Capillary refill takes less than 2 seconds. Neurological:      General: No focal deficit present. Mental Status: She is alert and oriented to person, place, and time. Cranial Nerves: No cranial nerve deficit. Sensory: No sensory deficit. Motor: No weakness. Psychiatric:         Mood and Affect: Mood normal.         Behavior: Behavior normal.         Thought Content: Thought content normal.         Judgment: Judgment normal.          Past History:   PMHx   Past Medical History:   Diagnosis Date    Acute blood loss anemia     Age-related osteoporosis without current pathological fracture     Closed displaced fracture of right femoral neck (HCC) 02/28/2021    Generalized weakness     Insomnia     Neuropathy     Obstipation     Pedal edema     Primary hypertension     RLS (restless legs syndrome)         PSHX:  has a past surgical history that includes hip surgery (Right, 02/23/2021); Total abdominal hysterectomy w/ bilateral salpingoophorectomy; Appendectomy; and Cholecystectomy. Fam HX: family history is not on file. Soc HX:   Social History     Socioeconomic History    Marital status:      Spouse name: None    Number of children: None    Years of education: None    Highest education level: None   Tobacco Use    Smoking status: Never    Smokeless tobacco: Never   Substance and Sexual Activity    Alcohol use: Never    Drug use: Never       Allergies:    Allergies: No Known Allergies    Medications:   Medications:    lidocaine PF  10 mL IntraDERmal Once    levETIRAcetam  1,000 mg IntraVENous Once    [START ON 12/19/2022] levETIRAcetam  500 mg IntraVENous Q12H      Infusions:   PRN Meds: acetaminophen, 650 mg, Q4H PRN  ondansetron, 4 mg, Q8H PRN   Or  ondansetron, 4 mg, Q6H PRN  magnesium sulfate, 1,000 mg, PRN  labetalol, 10 mg, Q2H PRN        Labs      CBC:   Recent Labs     12/18/22  1821   WBC 12.4*   HGB 11.7*        BMP:    Recent Labs     12/18/22  1821      K 5.1      CO2 25   BUN 12/18/2022  EXAMINATION: CT OF THE HEAD WITHOUT CONTRAST  12/18/2022 5:38 pm TECHNIQUE: CT of the head was performed without the administration of intravenous contrast. Automated exposure control, iterative reconstruction, and/or weight based adjustment of the mA/kV was utilized to reduce the radiation dose to as low as reasonably achievable. COMPARISON: None. HISTORY: ORDERING SYSTEM PROVIDED HISTORY: HEAD TRAUMA, CLOSED, MILD, GCS >= 13, NO RISK FACTORS, NEURO EXAM NORMAL TECHNOLOGIST PROVIDED HISTORY: Has a \"code stroke\" or \"stroke alert\" been called? ->No Reason for exam:->fall/head injury Decision Support Exception - unselect if not a suspected or confirmed emergency medical condition->Emergency Medical Condition (MA) Reason for Exam: fall/head injury; Head trauma, closed, mild, GCS >= 13, no risk factors, neuro exam normal FINDINGS: Brain: There is some subarachnoid blood noted in left frontoparietal region. 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 Bilateral white matter hypodensities are noted in keeping with underlying microvascular disease. No abnormal extra-axial fluid collection. The gray-white differentiation is maintained without evidence of an acute infarct. There is no evidence of hydrocephalus. ORBITS: The visualized portion of the orbits demonstrate no acute abnormality. SINUSES: The visualized paranasal sinuses and mastoid air cells demonstrate no acute abnormality. SOFT TISSUES/SKULL:  No acute abnormality of the visualized skull or soft tissues. Mild amount of subarachnoid blood noted in the left frontoparietal region. This is not associated with any mass-effect or parenchymal hemorrhage. 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      CT CERVICAL SPINE WO CONTRAST    Result Date: 12/18/2022  EXAMINATION: CT OF THE CERVICAL SPINE WITHOUT CONTRAST 12/18/2022 5:38 pm TECHNIQUE: CT of the cervical spine was performed without the administration of intravenous contrast. Multiplanar reformatted images are provided for review. Automated exposure control, iterative reconstruction, and/or weight based adjustment of the mA/kV was utilized to reduce the radiation dose to as low as reasonably achievable. COMPARISON: None. HISTORY: ORDERING SYSTEM PROVIDED HISTORY: fall TECHNOLOGIST PROVIDED HISTORY: Reason for exam:->fall Decision Support Exception - unselect if not a suspected or confirmed emergency medical condition->Emergency Medical Condition (MA) Reason for Exam: fall FINDINGS: BONES/ALIGNMENT: There is no acute fracture or traumatic malalignment. DEGENERATIVE CHANGES: There are osteoarthritic changes of the left facet joints. SOFT TISSUES: There is no prevertebral soft tissue swelling. No acute abnormality of the cervical spine. XR CHEST PORTABLE    Result Date: 12/18/2022  EXAMINATION: ONE XRAY VIEW OF THE CHEST 12/18/2022 5:43 pm COMPARISON: 02/22/2021. HISTORY: ORDERING SYSTEM PROVIDED HISTORY: fall TECHNOLOGIST PROVIDED HISTORY: Reason for exam:->fall FINDINGS: The cardiac silhouette is unremarkable. Aortic vascular calcification with mild tortuosity. The lungs are clear. No infiltrate, pleural fluid or evidence of overt failure. Calcified granuloma in the right lateral lung base. No acute cardiopulmonary disease.        Personally reviewed Lab Studies, Imaging, and discussed case with patient    Electronically signed by Dangelo Petty MD on 12/18/2022 at 7:58 PM

## 2022-12-19 NOTE — PROGRESS NOTES
Occupational [address] [place] [address] ACUTE CARE OCCUPATIONAL THERAPY EVALUATION    Cady Amezcua, 8/23/1923, 2123/2123-A, 12/19/2022    Discharge Recommendation: Home with initial 24 hour supervision/assistance, Home Health OT services-S Level 2 (Recommend pt return to independent living at discharge)      History:  [place]:  The primary encounter diagnosis was Subarachnoid bleed ([address] Diagnoses of Fall, initial encounter, Laceration of scalp, initial encounter, and Injury of head, initial encounter were also pertinent to this visit. Subjective:  Patient states: \"I've been pretty self-sufficient my whole life! \"   Pain: Pt denied pain this date  Communication with other providers: PT Zuleyka Doan RN Oral Jovany  Restrictions: General Precautions, Fall Risk, Telemetry, Pulse Ox, BP cuff, Bed/chair alarm    Home Setup/Prior level of function:  Social/Functional History  Lives With: Alone  Type of Home: Apartment ([facility])  Home Layout: One level  Home Access: Level entry  Bathroom Shower/Tub: Walk-in shower  Bathroom Toilet: Handicap height (with toilet riser)  Bathroom Equipment: Grab bars in shower, Toilet raiser  Bathroom Accessibility: Accessible  Home Equipment: Nicholette Hopes, rolling  ADL Assistance: Independent  Homemaking Assistance: Independent  Homemaking Responsibilities: Yes (does cooking, cleaning, and laundry)  Ambulation Assistance: Independent (mod I with RW in apartment, cane in community)  Transfer Assistance: Independent  Active : No ([facility] transportation)  Occupation: Retired    Examination:  Observation: Sitting in chair upon [place] arrival. Pleasant and agreeable to evaluation.   Vision: WFL (Glasses)  Hearing: WFL  Vitals: Stable vitals throughout session    Body Systems and functions:  ROM: Active shoulder flexion grossly 0-40' in BL UEs, WFL distally in BL UEs  Strength: 4/5 MMT elbow flexors, elbow extensors, and grasp in BL UEs  Sensation: WFL (denies numbness/tingling)  Tone: Normal  Coordination: WFL for ADLs  Perception: WNL    Activities of Daily Living (ADLs):  Feeding: Independent   Grooming: SBA (able to complete in standing at sink)  UB bathing: SBA   LB bathing: CGA (dynamic standing balance for safety with reaching bottom)  UB dressing: SBA (donning clean robe at chair level)  LB dressing: CGA (dynamic standing balance for safety with clothing mgmt to hips, able to don BL socks at chair level SBA by leaning forward to reach feet)  Toileting: CGA    Cognitive and Psychosocial Functioning:  Overall cognitive status: WFL (grossly for age 80)  Affect: Normal     Balance:   Sitting: SBA at edge of chair  Standing: SBA with RW static standing, CGA for safety with dynamic standing tasks    Functional Mobility:  Bed Mobility: Not observed. Pt received sitting in chair upon OT arrival.  Transfers: CGA to/from chair (min cues for safe hand placement each direction)  Ambulation: CGA with RW ~100 ft in hallway (See PT evaluation for gait assessment)      AM-PAC 6 click short form for inpatient daily activity:   How much help from another person does the patient currently need. .. Unable  Dep A Lot  Max A A Lot   Mod A A Little  Min A A Little   CGA  SBA None   Mod I  Indep  Sup   1. Putting on and taking off regular lower body clothing? [] 1    [] 2   [] 2   [] 3   [x] 3   [] 4      2. Bathing (including washing, rinsing, drying)? [] 1   [] 2   [] 2 [] 3 [x] 3 [] 4   3. Toileting, which includes using toilet, bedpan, or urinal? [] 1    [] 2   [] 2   [] 3   [x] 3   [] 4     4. Putting on and taking off regular upper body clothing? [] 1   [] 2   [] 2   [] 3   [x] 3    [] 4      5. Taking care of personal grooming such as brushing teeth? [] 1   [] 2    [] 2 [] 3    [x] 3   [] 4      6. Eating meals?    [] 1   [] 2   [] 2   [] 3   [] 3   [x] 4      Raw Score:  19    [24=0% impaired(CH), 23=1-19%(CI), 20-22=20-39%(CJ), 15-19=40-59%(CK), 10-14=60-79%(CL), 7-9=80-99%(CM), 6=100%(CN)]     Treatment:  Therapeutic Activity Training:   Therapeutic activity training was instructed today. Cues were given for safety, sequence, UE/LE placement, awareness, and balance. Activities performed today included bed mobility training, UB/LB dressing tasks, safe transfer training, functional mobility with RW, education on role of OT, POC, importance of EOB/OOB activity, d/c planning and recommendations    Safety Measures: Gait belt used, Left in Chair, Alarm in place    Assessment:  Pt is a 80year old female with a past medical history of Acute blood loss anemia, Age-related osteoporosis without current pathological fracture, Closed displaced fracture of right femoral neck (HCC), Generalized weakness, Insomnia, Neuropathy, Obstipation, Pedal edema, Primary hypertension, and RLS (restless legs syndrome). Pt admitted following a fall from her toilet and diagnosed with a Lt parietal subarachnoid hemorrhage which is undergoing non-operative treatment. Pt lives alone in [facility] independent living and reports at baseline she is independent with ADLs, household IADLs, and ambulates mod I with a RW or cane. Pt performed well this date; recommend pt return to independent living at discharge [facility] for home safety evaluation. Complexity: Moderate  Prognosis: Good  Plan: 3x/week      Goals:  1. Pt will complete all aspects of bed mobility for EOB/OOB ADLs mod I with HOB flat  2. Pt will complete UB/LB bathing with supervision  3. Pt will complete all aspects of LB dressing with supervision  4. Pt will complete all functional transfers to and from bed, chair, toilet, shower chair mod I with use of grab bars PRN  5. Pt will ambulate HH distance to bathroom for toileting with supervision with RW  6. Pt will complete all aspects of toileting task with supervision  7. Pt will complete oral hygiene/grooming routine in standing at sink with supervision   8.  Pt will complete ther ex/ther act with focus on UE strengthening, functional standing tolerance >8 minutes, dynamic standing balance for ADL/IADL tasks    Time:   Time in: 1100  Time out: 1120  Timed treatment minutes: 10  Total time: 20    Electronically signed by:    VAL Colindres/L, [address] [place] [id number]

## 2022-12-19 NOTE — PROGRESS NOTES
Repeat head CT done this AM shows stable, unchanged frontoparietal subarachnoid hemorrhage. No neurosurgical intervention indicated at this time. Patient is neurologically intact. OK to move patient out of ICU and discharge once cleared by IM. Also OK to discontinue Keppra as patient does not have history of seizure and there was no seizure activity appreciated upon presentation or during her hospital stay. Please hold home ASA two weeks, then OK to restart on 1/2/2023. Follow-up imaging not needed. Stable from neurosurgery standpoint. Neurosurgery will sign off at this time. However always available for re-consult. Thank you for the opportunity to participate in the care of this patient.     Electronically signed by Waqar Webb PA-C on 12/19/22 at 3:55 PM EST

## 2022-12-19 NOTE — PROGRESS NOTES
V2.0  [facility] Hospitalist Progress Note      Name:  Mary Lou Emery /Age/Sex: 1923  (80 y.o. female)   MRN & CSN:  [id number] & [id number] Encounter Date/Time: 2022 1:59 PM EST    Location:  -A PCP: Wendie Crisostomo Day: 2    Assessment and Plan: Mary Lou Emery is a 80 y.o. female who presents with traumatic subarachnoid bleed      Plan:  Traumatic subarachnoid bleed-due to fall . Denies LOC, however in ER notes it states that she did lose consciousness. .  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 Neurosurgery following. Holding home aspirin for 2 weeks, okay to restart on . Neurosurgery states okay to discontinue Keppra. Hypertension-Target SBP less than 150. Head laceration-repaired in ER. Diet ADULT DIET; Regular; 5 carb choices (75 gm/meal)    DVT Prophylaxis [] Lovenox, []  Heparin, [] SCDs, [] Ambulation,  [] Eliquis, [] Xarelto  [] Coumadin   Code Status  Surrogate Decision Maker/ POA DNR-CCA  Steffanie Popper   Disposition From: Home  Expected Disposition: TBD  Estimated Date of Discharge:  As per intensivist  Patient requires continued admission due to subarachnoid bleed   Home O2 None     Subjective/Interval history:   Chief Complaint: Fall (Lac to back of the head, no LOC, no blood thinners) and Head Laceration     Patient states she fell off a lift and hit the back of her head    Review of Systems:    Negative unless mentioned above    Objective:   No intake or output data in the 24 hours ending 22 1359     Vitals:   BP (!) 146/89   Pulse 65   Temp 98 °F (36.7 °C) (Oral)   Resp 18   Ht 5' 2.5\" (1.588 m)   Wt 105 lb 13.1 oz (48 kg)   SpO2 94%   BMI 19.05 kg/m²     Physical Exam:   General: NAD, laying in bed  Eyes: no discharge  HENT: NC, has dressing wrapped around head  Cardiovascular: RRR  Respiratory: Clear to auscultation   Gastrointestinal: Soft, non tender, nondistended  Genitourinary: no suprapubic tenderness  Musculoskeletal: No edema, nontender  Skin: has bruising on arms  Neuro: Alert. No gross deficits  Psych: Mood appropriate. Medications:   Medications:    lidocaine PF  10 mL IntraDERmal Once    levETIRAcetam  500 mg IntraVENous Q12H      Infusions:   PRN Meds: acetaminophen, 650 mg, Q4H PRN  ondansetron, 4 mg, Q8H PRN   Or  ondansetron, 4 mg, Q6H PRN  magnesium sulfate, 1,000 mg, PRN  labetalol, 5 mg, Q2H PRN  melatonin, 3 mg, Nightly PRN        Labs      Recent Labs     12/18/22  1821   WBC 12.4*   HGB 11.7*   HCT 37.2         Recent Labs     12/18/22 1821 12/19/22  0500    142   K 5.1 4.9    107   CO2 25 23   BUN 31* 26*   CREATININE 1.1 0.9     Recent Labs     12/18/22  1821   AST 20   ALT 26   BILITOT 0.3   ALKPHOS 87     No results for input(s): INR in the last 72 hours. No results for input(s): CKTOTAL, CKMB, CKMBINDEX, TROPONINT in the last 72 hours.   No results found for: LABA1C  CALCIUM:  8.4/23 (12/19 0500)  Lab Results   Component Value Date/Time    MG 2.2 12/19/2022 09:28 AM           Electronically signed by Go Velasco MD on 12/19/2022 at 1:59 PM

## 2022-12-19 NOTE — PROGRESS NOTES
V2.0  [facility]  Progress Note      Name:  Beata Maynard /Age/Sex: 1923  (80 y.o. female)   MRN & CSN:  [id number] & [id number] Encounter Date/Time: 2022 3:05 PM EST    Location:  -A PCP: Wendie Crisostomo Day: 2    Assessment and Plan: Beata Maynard is a 80 y.o. female with pmh of hypertension who presents with Traumatic subarachnoid bleed with LOC of 1 hour to 5 hours 59 minutes, initial encounter [facility])        Traumatic subarachnoid hemorrhage  Status post fall  -Neurosurgery consulted, appreciate recs  -Follow-up head CT reviewed. -Continue neurochecks per protocol  -Keep systolic blood pressure [phone], labetalol as needed  -Continue Keppra per neurosurgery recommendations. This will continue for 5 days. -PT/OT  -Fall precautions    History of hypertension  -Home medications resumed      Patient is stable for transfer to the ICU today. Critical care team will sign off at this time, please call with questions. Diet ADULT DIET; Regular; 5 carb choices (75 gm/meal)   DVT Prophylaxis [] Lovenox, []  Heparin, [] SCDs, [] Ambulation,  [] Eliquis, [] Xarelto  [] Coumadin   Code Status DNR-CCA   Disposition From: Home  Expected Disposition: TBD  Estimated Date of Discharge: TBD  Patient requires continued admission due to subarachnoid hemorrhage   Surrogate Decision Maker/ POA VENKATA Katz     Subjective:     Chief Complaint: Fall (Lac to back of the head, no LOC, no blood thinners) and Head Laceration     Patient seen and examined this AM.  Labs and vitals reviewed, no acute events overnight. Patient denies shortness of breath or chest pain. She denies abdominal pain. She does report slight headache that is unchanged. She denies any new weaknesses. She denies any vision changes.   Plan of care discussed with bedside RN          Review of Systems:    Review of Systems    As above    Objective:   No intake or output data in the 24 hours ending 12/19/22 1505     Vitals:   Vitals:    12/19/22 1019   BP:    Pulse:    Resp:    Temp:    SpO2: 94%       Physical Exam:     General: NAD  Eyes: EOMI  ENT: neck supple  Cardiovascular: Regular rate and rhythm  Respiratory: Clear to auscultation, no wheezing  Gastrointestinal: Soft, non tender  Genitourinary: no suprapubic tenderness  Musculoskeletal: No edema  Skin: warm, dry. Head lac with dressing intact. Neuro: Alert.   Psych: Mood appropriate, calm    Medications:   Medications:    lidocaine PF  10 mL IntraDERmal Once    levETIRAcetam  500 mg IntraVENous Q12H      Infusions:   PRN Meds: acetaminophen, 650 mg, Q4H PRN  ondansetron, 4 mg, Q8H PRN   Or  ondansetron, 4 mg, Q6H PRN  magnesium sulfate, 1,000 mg, PRN  labetalol, 5 mg, Q2H PRN  melatonin, 3 mg, Nightly PRN        Labs      Recent Results (from the past 24 hour(s))   CBC with Auto Differential    Collection Time: 12/18/22  6:21 PM   Result Value Ref Range    WBC 12.4 (H) 4.0 - 10.5 K/CU MM    RBC 3.91 (L) 4.2 - 5.4 M/CU MM    Hemoglobin 11.7 (L) 12.5 - 16.0 GM/DL    Hematocrit 37.2 37 - 47 %    MCV 95.1 78 - 100 FL    MCH 29.9 27 - 31 PG    MCHC 31.5 (L) 32.0 - 36.0 %    RDW 14.1 11.7 - 14.9 %    Platelets 815 979 - 482 K/CU MM    MPV 10.3 7.5 - 11.1 FL    Differential Type AUTOMATED DIFFERENTIAL     Segs Relative 83.9 (H) 36 - 66 %    Lymphocytes % 11.0 (L) 24 - 44 %    Monocytes % 3.8 0 - 4 %    Eosinophils % 0.4 0 - 3 %    Basophils % 0.5 0 - 1 %    Segs Absolute 10.4 K/CU MM    Lymphocytes Absolute 1.4 K/CU MM    Monocytes Absolute 0.5 K/CU MM    Eosinophils Absolute 0.1 K/CU MM    Basophils Absolute 0.1 K/CU MM    Nucleated RBC % 0.0 %    Total Nucleated RBC 0.0 K/CU MM    Total Immature Neutrophil 0.05 K/CU MM    Immature Neutrophil % 0.4 0 - 0.43 %   Comprehensive Metabolic Panel    Collection Time: 12/18/22  6:21 PM   Result Value Ref Range    Sodium 140 135 - 145 MMOL/L    Potassium 5.1 3.5 - 5.1 MMOL/L    Chloride 104 99 - 110 mMol/L    CO2 25 21 - 32 MMOL/L    BUN 31 (H) 6 - 23 MG/DL    Creatinine 1.1 0.6 - 1.1 MG/DL    Est, Glom Filt Rate 45 (L) >60 mL/min/1.73m2    Glucose 109 (H) 70 - 99 MG/DL    Calcium 9.1 8.3 - 10.6 MG/DL    Albumin 4.0 3.4 - 5.0 GM/DL    Total Protein 7.1 6.4 - 8.2 GM/DL    Total Bilirubin 0.3 0.0 - 1.0 MG/DL    ALT 26 10 - 40 U/L    AST 20 15 - 37 IU/L    Alkaline Phosphatase 87 40 - 129 IU/L    Anion Gap 11 4 - 16   Basic metabolic panel    Collection Time: 12/19/22  5:00 AM   Result Value Ref Range    Sodium 142 135 - 145 MMOL/L    Potassium 4.9 3.5 - 5.1 MMOL/L    Chloride 107 99 - 110 mMol/L    CO2 23 21 - 32 MMOL/L    Anion Gap 12 4 - 16    BUN 26 (H) 6 - 23 MG/DL    Creatinine 0.9 0.6 - 1.1 MG/DL    Est, Glom Filt Rate 57 (L) >60 mL/min/1.73m2    Glucose 89 70 - 99 MG/DL    Calcium 8.4 8.3 - 10.6 MG/DL   Magnesium    Collection Time: 12/19/22  5:00 AM   Result Value Ref Range    Magnesium 2.2 1.8 - 2.4 mg/dl   Magnesium    Collection Time: 12/19/22  9:28 AM   Result Value Ref Range    Magnesium 2.2 1.8 - 2.4 mg/dl        Imaging/Diagnostics Last 24 Hours   XR PELVIS (1-2 VIEWS)    Result Date: 12/18/2022  EXAMINATION: ONE XRAY VIEW OF THE PELVIS 12/18/2022 5:43 pm COMPARISON: 05/18/2021 HISTORY: ORDERING SYSTEM PROVIDED HISTORY: trauma TECHNOLOGIST PROVIDED HISTORY: Reason for exam:->trauma Reason for Exam: trauma FINDINGS: No evidence of pelvic fracture. Bilateral hips demonstrate normal alignment. No focal osseous lesion. SI joints are symmetric. No acute abnormality of the pelvis. If there is clinical suspicion for radiographically occult fracture and the patient is unable to bear weight, recommend MRI.      CT HEAD WO CONTRAST    Result Date: 12/19/2022  EXAMINATION: CT OF THE HEAD WITHOUT CONTRAST  12/19/2022 8:12 am TECHNIQUE: CT of the head was performed without the administration of intravenous contrast. Automated exposure control, iterative reconstruction, and/or weight based adjustment of the mA/kV was utilized to reduce the radiation dose to as low as reasonably achievable. COMPARISON: 12/18/2022, 05/18/2021 HISTORY: ORDERING SYSTEM PROVIDED HISTORY: Subarachnoid hemorrhage follow up TECHNOLOGIST PROVIDED HISTORY: Has a \"code stroke\" or \"stroke alert\" been called? ->No Reason for exam:->Subarachnoid hemorrhage follow up Reason for Exam: Subarachnoid hemorrhage follow up FINDINGS: BRAIN/VENTRICLES: The ventricles and cisterns are unchanged in size and configuration. No midline shift or significant mass effect. Acute subarachnoid hemorrhage overlying the left frontoparietal convexity appears unchanged. Hyperdensity within the left temporal lobe is unchanged from 05/18/2021. Small area of encephalomalacia of the left parietal region, similar to prior. The gray-white interface appears grossly unchanged. ORBITS: Unchanged SINUSES: Unchanged SOFT TISSUES/SKULL:  No depressed calvarial fracture. Unchanged small subarachnoid hemorrhage overlying the left frontoparietal region. No acute interval change. CT HEAD WO CONTRAST    Result Date: 12/18/2022  EXAMINATION: CT OF THE HEAD WITHOUT CONTRAST  12/18/2022 5:38 pm TECHNIQUE: CT of the head was performed without the administration of intravenous contrast. Automated exposure control, iterative reconstruction, and/or weight based adjustment of the mA/kV was utilized to reduce the radiation dose to as low as reasonably achievable. COMPARISON: None. HISTORY: ORDERING SYSTEM PROVIDED HISTORY: HEAD TRAUMA, CLOSED, MILD, GCS >= 13, NO RISK FACTORS, NEURO EXAM NORMAL TECHNOLOGIST PROVIDED HISTORY: Has a \"code stroke\" or \"stroke alert\" been called? ->No Reason for exam:->fall/head injury Decision Support Exception - unselect if not a suspected or confirmed emergency medical condition->Emergency Medical Condition (MA) Reason for Exam: fall/head injury;  Head trauma, closed, mild, GCS >= 13, no risk factors, neuro exam normal FINDINGS: Brain: There is some subarachnoid blood noted in left frontoparietal region. 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 Bilateral white matter hypodensities are noted in keeping with underlying microvascular disease. No abnormal extra-axial fluid collection. The gray-white differentiation is maintained without evidence of an acute infarct. There is no evidence of hydrocephalus. ORBITS: The visualized portion of the orbits demonstrate no acute abnormality. SINUSES: The visualized paranasal sinuses and mastoid air cells demonstrate no acute abnormality. SOFT TISSUES/SKULL:  No acute abnormality of the visualized skull or soft tissues. Mild amount of subarachnoid blood noted in the left frontoparietal region. This is not associated with any mass-effect or parenchymal hemorrhage. 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 CT CERVICAL SPINE WO CONTRAST    Result Date: 12/18/2022  EXAMINATION: CT OF THE CERVICAL SPINE WITHOUT CONTRAST 12/18/2022 5:38 pm TECHNIQUE: CT of the cervical spine was performed without the administration of intravenous contrast. Multiplanar reformatted images are provided for review. Automated exposure control, iterative reconstruction, and/or weight based adjustment of the mA/kV was utilized to reduce the radiation dose to as low as reasonably achievable. COMPARISON: None. HISTORY: ORDERING SYSTEM PROVIDED HISTORY: fall TECHNOLOGIST PROVIDED HISTORY: Reason for exam:->fall Decision Support Exception - unselect if not a suspected or confirmed emergency medical condition->Emergency Medical Condition (MA) Reason for Exam: fall FINDINGS: BONES/ALIGNMENT: There is no acute fracture or traumatic malalignment. DEGENERATIVE CHANGES: There are osteoarthritic changes of the left facet joints. SOFT TISSUES: There is no prevertebral soft tissue swelling. No acute abnormality of the cervical spine.      XR CHEST PORTABLE    Result Date: 12/18/2022  EXAMINATION: ONE XRAY VIEW OF THE CHEST 12/18/2022 5:43 pm COMPARISON: 02/22/2021. HISTORY: ORDERING SYSTEM PROVIDED HISTORY: fall TECHNOLOGIST PROVIDED HISTORY: Reason for exam:->fall FINDINGS: The cardiac silhouette is unremarkable. Aortic vascular calcification with mild tortuosity. The lungs are clear. No infiltrate, pleural fluid or evidence of overt failure. Calcified granuloma in the right lateral lung base. No acute cardiopulmonary disease. CTA head neck with contrast    Result Date: 12/18/2022  EXAMINATION: CTA OF THE HEAD AND NECK WITH CONTRAST 12/18/2022 8:28 pm: TECHNIQUE: CTA of the head and neck was performed with the administration of intravenous contrast. Multiplanar reformatted images are provided for review. MIP images are provided for review. Stenosis of the internal carotid arteries measured using NASCET criteria. Automated exposure control, iterative reconstruction, and/or weight based adjustment of the mA/kV was utilized to reduce the radiation dose to as low as reasonably achievable. COMPARISON: Noncontrast CT head done earlier same day. HISTORY: ORDERING SYSTEM PROVIDED HISTORY: [facility] TECHNOLOGIST PROVIDED HISTORY: Reason for exam:->[facility] Has a \"code stroke\" or \"stroke alert\" been called? ->No Decision Support Exception - unselect if not a suspected or confirmed emergency medical condition->Emergency Medical Condition (MA) Reason for Exam: [facility] FINDINGS: CTA NECK: AORTIC ARCH/ARCH VESSELS: No dissection or arterial injury. No significant stenosis of the brachiocephalic or subclavian arteries. Atherosclerosis in the visualized thoracic aorta, right brachiocephalic and bilateral subclavian arteries. CAROTID ARTERIES: No dissection, arterial injury, or hemodynamically significant stenosis by NASCET criteria. Bilateral common carotid artery and carotid bulb atherosclerotic plaque. Mild atherosclerotic plaque in the distal left cervical internal carotid artery. VERTEBRAL ARTERIES: No dissection, arterial injury, or significant stenosis. Scattered atherosclerotic plaque in the bilateral cervical vertebral arteries without hemodynamically significant stenosis. SOFT TISSUES: No focal consolidation in the visualized lungs. Partially visualized small right pleural effusion. No cervical or superior mediastinal lymphadenopathy. The larynx and pharynx are unremarkable. No acute abnormality of the salivary and thyroid glands. BONES: No acute osseous abnormality. Multilevel degenerative changes in the visualized spine. CTA HEAD: ANTERIOR CIRCULATION: No significant stenosis of the intracranial internal carotid, anterior cerebral, or middle cerebral arteries. No aneurysm. Atherosclerosis in the bilateral intracranial internal carotid arteries without hemodynamically significant stenosis. POSTERIOR CIRCULATION: Focal mild narrowing in the right posterior cerebral artery P2 segment. The right posterior cerebral artery is otherwise patent. No significant stenosis of the vertebral, basilar, or left posterior cerebral arteries. No aneurysm. OTHER: No dural venous sinus thrombosis on this non-dedicated study. BRAIN: Redemonstration of a small amount of acute subarachnoid hemorrhage in the left parietal region. 1.  No focal hemodynamically significant stenosis, occlusion or aneurysm in the large arteries of the head and neck. 2.  Redemonstration of a small amount of acute subarachnoid hemorrhage in the left parietal region.        Electronically signed by CHOCO Cottrell CNP on 12/19/2022 at 3:05 PM

## 2022-12-19 NOTE — CONSULTS
Brianda Sapp, 8/23/1923, 2123/2123-A, 12/19/2022    History  [place]:  The primary encounter diagnosis was Subarachnoid bleed ([address] Diagnoses of Fall, initial encounter, Laceration of scalp, initial encounter, and Injury of head, initial encounter were also pertinent to this visit. Patient  has a past medical history of Acute blood loss anemia, Age-related osteoporosis without current pathological fracture, Closed displaced fracture of right femoral neck (HCC), Generalized weakness, Insomnia, Neuropathy, Obstipation, Pedal edema, Primary hypertension, and RLS (restless legs syndrome). Patient  has a past surgical history that includes hip surgery (Right, 02/23/2021); Total abdominal hysterectomy w/ bilateral salpingoophorectomy; Appendectomy; and Cholecystectomy. Subjective:  Patient states:  \"I won't be here tomorrow. \"    Pain:  denies pain.     Communication with other providers:  Handoff to RN, OT  Restrictions: general precautions    Home Setup/Prior level of function  Social/Functional History  Lives With: Alone  Type of Home: Apartment ([facility])  Home Layout: One level  Home Access: Level entry  Bathroom Shower/Tub: Walk-in shower  Bathroom Toilet: Handicap height (with toilet riser)  Bathroom Equipment: Grab bars in shower, Toilet raiser  Bathroom Accessibility: Accessible  Home Equipment: [place], rolling  ADL Assistance: Independent  Homemaking Assistance: Independent  Homemaking Responsibilities: Yes (does cooking, cleaning, and laundry)  Ambulation Assistance: Independent (mod I with RW in apartment, cane in community)  Transfer Assistance: Independent  Active : No ([address] [facility] transportation)  Occupation: Retired    Examination of body systems (includes body structures/functions, activity/participation limitations):  Observation:  pt up in chair upon arrival and agreeable to therapy  Vision:  [facility]  Hearing: slightly [place]  Cardiopulmonary:  no O2 needs  Cognition: A&O x3, see OT/SLP note for further evaluation. Musculoskeletal  ROM R/L:  WFL. Strength R/L:  4+/5, minimal impairment in function and endurance. Neuro:  WFL      Mobility:  Rolling L/R:  NT,pt up in chair at beginning and end of session  Supine to sit:  NT, pt up in chair at beginning and end of session  Transfers: pt completed STS to/from chair CGA with cues for hand placement  Sitting balance:  good. Standing balance:  fair+. Gait: pt ambulated 100' with RW CGA with decreased mj and slightly narrowed DIONNE. No LOB throughout. Cues provided for pathway and walker management    [facility] 6 Clicks Inpatient Mobility:  AM-PAC Inpatient Mobility Raw Score : 18    Safety: patient left in chair with alarm on, call light within reach, RN notified, gait belt used. Assessment:  Pt is a 80 y.o. female admitted to the hospital after a fall resulting in a subarachnoid bleed. Pt is typically mod I with either a RW or SPC. Pt is currently performing transfers CGA and ambulating 100' with RW CGA. Pt is presenting with decreased endurance, impaired transfers, impaired gait, and decreased strength. Pt would benefit from continued acute care PT as well as Kenton Boland upon discharge to continue to address impairments. Complexity: moderate    Prognosis: Good, no significant barriers to participation at this time.      General Plan: 2-3 times per week/week     Equipment: none, pt owns all necessary equipment    Goals:  Short Term Goals  Time Frame for Short Term Goals: 1 week  Short Term Goal 1: Pt to complete all bed mobility mod I  Short Term Goal 2: Pt to complete all STS transfers to/from bed, commode, and chair mod I  Short Term Goal 3: Pt to ambulate 100' with LRAD mod I       Treatment plan:  Bed mobility, transfers, balance, gait, TA, TX    Recommendations for NURSING mobility: amb with gait belt and RW    Time:   Time in: 1100  Time out: 1120  Timed treatment minutes: 10  Total time: 20    Electronically signed by:    Teddy Washington, PT  12/19/2022, 12:58 PM

## 2022-12-19 NOTE — CARE COORDINATION
Cm met with pt to initiate discharge planning. Pt is up in chair. Pt is alert and oriented. Pt states that she lives in independent living at [facility]. Pt has been at [facility] for 35 years and has never utilized the skilled nursing unit there. Pt has a niece who lives in [place] and pt states that her niece does everything for her. Pt has a walker, cane and either 3 in 1 BSC or Elevated toilet seat with handrails and this is what she fell off of prior to coming to ER. Pt goal is to return to independent living apt at discharge and states that her niece will transport home. PT eval reviewed [facility] noted. Message to MD almazan; PT rec for [facility], [facility].. Cm to follow.

## 2022-12-19 NOTE — ED PROVIDER NOTES
My participation in patient encounter was for a procedure only. Please see physician note for complete patient encounter and disposition. Procedure Note - CHOCO Hedrick CNP    Laceration Repair Procedure Note    Indication: Skin Laceration    Procedure:   - Procedure explained, including risks and benefits explained to the patient who expressed understanding. All questions were answered. Verbal consent obtained. - The Wound was prepped and draped in the usual sterile fashion using chlorhexidine and sterile saline.  - The wound is anesthetized using 1% Lidocaine, approximately 3 ml  - Wound was explored to it's depth,  no compromise of neurovascular structures, no foreign bodies. - Wound was irrigated with copious amounts of sterile saline and mechanically debrided utilizing sterile gauze. - The laceration was Closed with 5 staples  - Hemostasis and good cosmesis was achieved. Blood loss minimal.  - The wound area was then dressed with gauze wrap  - Patient tolerated procedure well without complications. Post procedure exam of the affected region reveals sensation intact and wound hemostatic. Total repaired wound length: 2.5cm    Discussed with Pt (and/or family member) at bedside today:  I discussed possibility of infection, retained foreign body, tendon injury, nerve injury. Wound care and scar minimization education was provided. Instructions were given to return for increasing pain, redness, streaking, discharge, or any other worsening or worrisome concerns. Wound check in 48 hours. Suture/Staple removal in 7 days.     ______________________________________________________________________      CHOCO Goldsmith CNP  12/18/22 1923

## 2022-12-19 NOTE — PLAN OF CARE
Problem: Discharge Planning  Goal: Discharge to home or other facility with appropriate resources  12/19/2022 1150 by Tremaine Sheehan RN  Outcome: Progressing  12/19/2022 1010 by Melvin Mccullough RN  Outcome: Progressing  Flowsheets (Taken 12/19/2022 0900)  Discharge to home or other facility with appropriate resources:   Identify barriers to discharge with patient and caregiver   Arrange for needed discharge resources and transportation as appropriate  12/19/2022 0147 by Vladimir Hadley RN  Outcome: Progressing  Flowsheets (Taken 12/19/2022 0142)  Discharge to home or other facility with appropriate resources:   Identify discharge learning needs (meds, wound care, etc)   Refer to discharge planning if patient needs post-hospital services based on physician order or complex needs related to functional status, cognitive ability or social support system     Problem: Pain  Goal: Verbalizes/displays adequate comfort level or baseline comfort level  12/19/2022 1150 by Tremaine Sheehan RN  Outcome: Progressing  12/19/2022 1010 by Melvin Mccullough RN  Outcome: Progressing  12/19/2022 0147 by Vladimir Hadley RN  Outcome: Progressing     Problem: Safety - Adult  Goal: Free from fall injury  12/19/2022 1150 by Tremaine Sheehan RN  Outcome: Progressing  12/19/2022 1010 by Melvin Mccullough RN  Outcome: Progressing  12/19/2022 0147 by Vladimir Hadley RN  Outcome: Progressing     Problem: ABCDS Injury Assessment  Goal: Absence of physical injury  12/19/2022 1150 by Tremaine Sheehan RN  Outcome: Progressing  12/19/2022 1010 by Melvin Mccullough RN  Outcome: Progressing  12/19/2022 0147 by Vladimir Hadley RN  Outcome: Progressing     Problem: Skin/Tissue Integrity  Goal: Absence of new skin breakdown  Description: 1. Monitor for areas of redness and/or skin breakdown  2. Assess vascular access sites hourly  3. Every 4-6 hours minimum:  Change oxygen saturation probe site  4.   Every 4-6 hours:  If on nasal continuous positive airway pressure, respiratory therapy assess nares and determine need for appliance change or resting period.   12/19/2022 1150 by Chinedu Ramirez RN  Outcome: Progressing  12/19/2022 1010 by Ayah Christopher RN  Outcome: Progressing  12/19/2022 0147 by Lilly Gabriel RN  Outcome: Progressing

## 2022-12-19 NOTE — PLAN OF CARE
Problem: Discharge Planning  Goal: Discharge to home or other facility with appropriate resources  Outcome: Progressing  Flowsheets (Taken 12/19/2022 0142)  Discharge to home or other facility with appropriate resources:   Identify discharge learning needs (meds, wound care, etc)   Refer to discharge planning if patient needs post-hospital services based on physician order or complex needs related to functional status, cognitive ability or social support system     Problem: Pain  Goal: Verbalizes/displays adequate comfort level or baseline comfort level  Outcome: Progressing     Problem: Safety - Adult  Goal: Free from fall injury  Outcome: Progressing     Problem: ABCDS Injury Assessment  Goal: Absence of physical injury  Outcome: Progressing     Problem: Skin/Tissue Integrity  Goal: Absence of new skin breakdown  Description: 1. Monitor for areas of redness and/or skin breakdown  2. Assess vascular access sites hourly  3. Every 4-6 hours minimum:  Change oxygen saturation probe site  4. Every 4-6 hours:  If on nasal continuous positive airway pressure, respiratory therapy assess nares and determine need for appliance change or resting period.   Outcome: Progressing

## 2022-12-19 NOTE — CONSULTS
[facility] Tele-Critical Care Consult Note      Name:  Minh Pompa /Age/Sex: 1923  (80 y.o. female)   MRN & CSN:  [id number] & [id number] Encounter Date/Time: 2022 7:19 PM EST   Location:   PCP: Leobardo Perez DO     Attending:Elio Adams MD  Consulting Provider: Leana Kingston, [address] Day: 1    This is a telemedicine visit  Physician Location: [facility] working from home   Patient Location: [facility] at [address] [facility] and [address] is a 80 y.o. female with past medical history of HTN who presents with Traumatic subarachnoid bleed with LOC of 1 hour to 5 hours 59 minutes, initial encounter ([facility] [address] - Traumatic SAH  - S/P Fall  - S/P LOC  - HTN  - Hx of Anemia/Osteoporosis/RLS/Insomnia    Plan:    Admit to ICU-Critical and at risk for further neurologic compromise. Neuro check per protocol. NSG was consulted by ED and was ok to admit to ICU. Keep -150. Labetalol PRN  Repeat head CT scan in AM  Bed rest for now. No AC  SCDs for DVTP. Tyleno ofr Pain. Zofran PRN  Keppra for seizure prevention per NSG/ED communication. Critically ill with SAH and high risk for neurologic decompensation. Time is 40 minutes of CC time. Patient seen/evaluated/labs, chart and supporting data reviewed through the [facility] ICU platform and management plan discussed with team in details. History Obtained From:    patient, electronic medical record    History of Present Illness:     Chief Complaint: Traumatic subarachnoid bleed with LOC of 1 hour to 5 hours 59 minutes, initial encounter ([facility] [address]    Minh Pompa is 80 y.o. female that presents with complaint of fall, head injury. Patient was on a stool at home she lives independently and she slipped and fell backwards hit the back of her head has a wound, brought in by EMS she passed out for a bit woke up and called 911.   She has a slight headache otherwise denies any fevers nausea vomiting chest pain shortness of breath arm leg pain neck pain back pain no other questions or concerns. He denies blood thinners. In ED head CT scan showed mild SAH and NSG was called by ED/MD and advised ICU admission. Objective:   No intake or output data in the 24 hours ending 12/18/22 1919   Vitals:   Vitals:    12/18/22 1613 12/18/22 1633 12/18/22 1807 12/18/22 1832   BP: (!) 149/71  (!) 153/75 126/72   Pulse: 83 79 74 71   Resp: 16 26 21   Temp: 98.4 °F (36.9 °C)  98.4 °F (36.9 °C)    TempSrc: Oral      SpO2: 96%  96% 96%   Weight: 104 lb (47.2 kg)      Height: 5' 2.5\" (1.588 m)          Medications Prior to Admission     Prior to Admission medications    Medication Sig Start Date End Date Taking? Authorizing Provider   DILT- MG extended release capsule TAKE 1 CAPSULE BY MOUTH  DAILY 12/15/22   Hirammelissa Manuel, DO   hydroCHLOROthiazide (HYDRODIURIL) 25 MG tablet TAKE 1 TABLET BY MOUTH  DAILY 12/15/22   Hiram Kaleb, DO   melatonin 5 MG TABS tablet Take 5 mg by mouth nightly    Historical Provider, MD   gabapentin (NEURONTIN) 100 MG capsule Take 1 capsule by mouth nightly for 90 days. 10/5/22 1/3/23  Hiram Kaleb, DO   aspirin 81 MG EC tablet Take 1 tablet by mouth daily 3/17/21   JOSELUIS Youssef MD       Physical Exam: Need 8 Elements   Exam is performed through direct observation by video conferencing along with assistance from the bedside nurse and a bluetooth stethoscope. General: Awake  Eyes: EOMI  ENT: neck supple, Back of head laceration   Cardiovascular: Regular rate. Reported S1, S2  Respiratory: Reported Clear to auscultation  Gastrointestinal: Soft, non tender  Genitourinary: no suprapubic tenderness  Musculoskeletal: No edema  Neuro: Alert, awake and moving all extremities. No focal deficit.        Past Medical History:   PMHx   Past Medical History:   Diagnosis Date    Acute blood loss anemia     Age-related osteoporosis without current pathological fracture     Closed displaced fracture of right femoral neck ([facility] [address] 02/28/2021    Generalized weakness     Insomnia     Neuropathy     Obstipation     Pedal edema     Primary hypertension     RLS (restless legs syndrome)      PSHX:  has a past surgical history that includes hip surgery (Right, 02/23/2021); Total abdominal hysterectomy w/ bilateral salpingoophorectomy; Appendectomy; and Cholecystectomy. Allergies: No Known Allergies  Fam HX:family history is not on file. Soc HX:   Social History     Socioeconomic History    Marital status:      Spouse name: None    Number of children: None    Years of education: None    Highest education level: None   Tobacco Use    Smoking status: Never    Smokeless tobacco: Never   Substance and Sexual Activity    Alcohol use: Never    Drug use: Never       Medications:   Medications:    lidocaine PF  10 mL IntraDERmal Once    levETIRAcetam  1,000 mg IntraVENous Once    [START ON 12/19/2022] levETIRAcetam  500 mg IntraVENous Q12H      Infusions:   PRN Meds: acetaminophen, 650 mg, Q4H PRN  ondansetron, 4 mg, Q8H PRN   Or  ondansetron, 4 mg, Q6H PRN  magnesium sulfate, 1,000 mg, PRN        Labs and Imaging   XR PELVIS (1-2 VIEWS)    Result Date: 12/18/2022  EXAMINATION: ONE XRAY VIEW OF THE PELVIS 12/18/2022 5:43 pm COMPARISON: 05/18/2021 HISTORY: ORDERING SYSTEM PROVIDED HISTORY: trauma TECHNOLOGIST PROVIDED HISTORY: Reason for exam:->trauma Reason for Exam: trauma FINDINGS: No evidence of pelvic fracture. Bilateral hips demonstrate normal alignment. No focal osseous lesion. SI joints are symmetric. No acute abnormality of the pelvis. If there is clinical suspicion for radiographically occult fracture and the patient is unable to bear weight, recommend MRI.      CT HEAD WO CONTRAST    Result Date: 12/18/2022  EXAMINATION: CT OF THE HEAD WITHOUT CONTRAST  12/18/2022 5:38 pm TECHNIQUE: CT of the head was performed without the administration of intravenous contrast. Automated exposure control, iterative reconstruction, and/or weight based adjustment of the mA/kV was utilized to reduce the radiation dose to as low as reasonably achievable. COMPARISON: None. HISTORY: ORDERING SYSTEM PROVIDED HISTORY: HEAD TRAUMA, CLOSED, MILD, GCS >= 13, NO RISK FACTORS, NEURO EXAM NORMAL TECHNOLOGIST PROVIDED HISTORY: Has a \"code stroke\" or \"stroke alert\" been called? ->No Reason for exam:->fall/head injury Decision Support Exception - unselect if not a suspected or confirmed emergency medical condition->Emergency Medical Condition (MA) Reason for Exam: fall/head injury; Head trauma, closed, mild, GCS >= 13, no risk factors, neuro exam normal FINDINGS: Brain: There is some subarachnoid blood noted in left frontoparietal region. 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 Bilateral white matter hypodensities are noted in keeping with underlying microvascular disease. No abnormal extra-axial fluid collection. The gray-white differentiation is maintained without evidence of an acute infarct. There is no evidence of hydrocephalus. ORBITS: The visualized portion of the orbits demonstrate no acute abnormality. SINUSES: The visualized paranasal sinuses and mastoid air cells demonstrate no acute abnormality. SOFT TISSUES/SKULL:  No acute abnormality of the visualized skull or soft tissues. Mild amount of subarachnoid blood noted in the left frontoparietal region. This is not associated with any mass-effect or parenchymal hemorrhage. 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 CT CERVICAL SPINE WO CONTRAST    Result Date: 12/18/2022  EXAMINATION: CT OF THE CERVICAL SPINE WITHOUT CONTRAST 12/18/2022 5:38 pm TECHNIQUE: CT of the cervical spine was performed without the administration of intravenous contrast. Multiplanar reformatted images are provided for review.  Automated exposure control, iterative reconstruction, and/or weight based adjustment of the mA/kV was utilized to reduce the radiation dose to as low as reasonably achievable. COMPARISON: None. HISTORY: ORDERING SYSTEM PROVIDED HISTORY: fall TECHNOLOGIST PROVIDED HISTORY: Reason for exam:->fall Decision Support Exception - unselect if not a suspected or confirmed emergency medical condition->Emergency Medical Condition (MA) Reason for Exam: fall FINDINGS: BONES/ALIGNMENT: There is no acute fracture or traumatic malalignment. DEGENERATIVE CHANGES: There are osteoarthritic changes of the left facet joints. SOFT TISSUES: There is no prevertebral soft tissue swelling. No acute abnormality of the cervical spine. XR CHEST PORTABLE    Result Date: 12/18/2022  EXAMINATION: ONE XRAY VIEW OF THE CHEST 12/18/2022 5:43 pm COMPARISON: 02/22/2021. HISTORY: ORDERING SYSTEM PROVIDED HISTORY: fall TECHNOLOGIST PROVIDED HISTORY: Reason for exam:->fall FINDINGS: The cardiac silhouette is unremarkable. Aortic vascular calcification with mild tortuosity. The lungs are clear. No infiltrate, pleural fluid or evidence of overt failure. Calcified granuloma in the right lateral lung base. No acute cardiopulmonary disease. CBC:   Recent Labs     12/18/22 1821   WBC 12.4*   HGB 11.7*        BMP:    Recent Labs     12/18/22 1821      K 5.1      CO2 25   BUN 31*   CREATININE 1.1   GLUCOSE 109*     Hepatic:   Recent Labs     12/18/22 1821   AST 20   ALT 26   BILITOT 0.3   ALKPHOS 87     Lipids: No results found for: CHOL, HDL, TRIG  Hemoglobin A1C: No results found for: LABA1C  TSH: No results found for: TSH  Troponin: No results found for: TROPONINT  Lactic Acid: No results for input(s): LACTA in the last 72 hours. BNP: No results for input(s): PROBNP in the last 72 hours.   UA:  Lab Results   Component Value Date/Time    NITRU POSITIVE 06/29/2022 01:33 PM    COLORU Yellow 06/29/2022 01:33 PM    PHUR 6.0 06/29/2022 01:33 PM    WBCUA 1627 06/29/2022 01:33 PM    RBCUA 14 06/29/2022 01:33 PM    BACTERIA 3+ 06/29/2022 01:33 PM    CLARITYU TURBID 06/29/2022 01:33 PM    SPECGRAV 1.019 06/29/2022 01:33 PM    LEUKOCYTESUR LARGE 06/29/2022 01:33 PM    UROBILINOGEN 0.2 06/29/2022 01:33 PM    BILIRUBINUR Negative 06/29/2022 01:33 PM    BLOODU MODERATE 06/29/2022 01:33 PM    GLUCOSEU Negative 06/29/2022 01:33 PM    Lei Cordon TRACE 06/29/2022 01:33 PM     Urine Cultures:   Lab Results   Component Value Date/Time    LABURIN >100,000 CFU/ml 06/29/2022 11:54 PM     Blood Cultures: No results found for: BC  No results found for: BLOODCULT2  Organism:   Lab Results   Component Value Date/Time    ORG Klebsiella pneumoniae 06/29/2022 11:54 PM       Personally reviewed Lab Studies, Imaging, and discussed with ICU staff    Electronically signed by Rico Winston MD on 12/18/2022 at 7:19 PM

## 2022-12-20 VITALS
OXYGEN SATURATION: 96 % | WEIGHT: 105.82 LBS | RESPIRATION RATE: 18 BRPM | HEIGHT: 63 IN | HEART RATE: 67 BPM | TEMPERATURE: 98.3 F | BODY MASS INDEX: 18.75 KG/M2 | SYSTOLIC BLOOD PRESSURE: 138 MMHG | DIASTOLIC BLOOD PRESSURE: 51 MMHG

## 2022-12-20 PROBLEM — S06.6X3A: Status: RESOLVED | Noted: 2022-12-18 | Resolved: 2022-12-20

## 2022-12-20 PROCEDURE — 94761 N-INVAS EAR/PLS OXIMETRY MLT: CPT

## 2022-12-20 PROCEDURE — 6370000000 HC RX 637 (ALT 250 FOR IP): Performed by: NURSE PRACTITIONER

## 2022-12-20 PROCEDURE — 97530 THERAPEUTIC ACTIVITIES: CPT

## 2022-12-20 PROCEDURE — 97535 SELF CARE MNGMENT TRAINING: CPT

## 2022-12-20 RX ORDER — ASPIRIN 81 MG/1
81 TABLET ORAL DAILY
Qty: 30 TABLET | Refills: 3 | Status: SHIPPED | OUTPATIENT
Start: 2023-01-02

## 2022-12-20 RX ADMIN — DILTIAZEM HYDROCHLORIDE 240 MG: 120 CAPSULE, COATED, EXTENDED RELEASE ORAL at 09:32

## 2022-12-20 RX ADMIN — HYDROCHLOROTHIAZIDE 25 MG: 25 TABLET ORAL at 09:32

## 2022-12-20 ASSESSMENT — PAIN SCALES - GENERAL
PAINLEVEL_OUTOF10: 0

## 2022-12-20 NOTE — PLAN OF CARE
Problem: Discharge Planning  Goal: Discharge to home or other facility with appropriate resources  12/20/2022 0934 by Mariaelena Mtz. SALOME Abarca  Outcome: Progressing  12/20/2022 0538 by Jose Capps RN  Outcome: Progressing     Problem: Pain  Goal: Verbalizes/displays adequate comfort level or baseline comfort level  12/20/2022 0934 by Mariaelena Mtz. SALOME Abarca  Outcome: Progressing  12/20/2022 0538 by Jose Capps RN  Outcome: Progressing     Problem: Safety - Adult  Goal: Free from fall injury  12/20/2022 0934 by Mariaelena Mtz. SALOME Abarca  Outcome: Progressing  12/20/2022 0538 by Jose Capps RN  Outcome: Progressing     Problem: ABCDS Injury Assessment  Goal: Absence of physical injury  12/20/2022 0934 by Mariaelena Mtz. SALOME Abarca  Outcome: Progressing  12/20/2022 0538 by Jose Capps RN  Outcome: Progressing     Problem: Skin/Tissue Integrity  Goal: Absence of new skin breakdown  Description: 1. Monitor for areas of redness and/or skin breakdown  2. Assess vascular access sites hourly  3. Every 4-6 hours minimum:  Change oxygen saturation probe site  4. Every 4-6 hours:  If on nasal continuous positive airway pressure, respiratory therapy assess nares and determine need for appliance change or resting period. 12/20/2022 0934 by Mariaelena Mtz.  Jose Escobar RN  Outcome: Progressing  12/20/2022 0538 by Jose Capps RN  Outcome: Progressing

## 2022-12-20 NOTE — PROGRESS NOTES
Patient dressed and small purse was retrieved from patient locker and gave to patient. Patient was wheelchair down to [facility] transportation.

## 2022-12-20 NOTE — PROGRESS NOTES
Physician Progress Note      Negrita Juan  CSN #:                  [id number]  :                       1923  ADMIT DATE:       2022 4:10 PM  DISCH DATE:  RESPONDING  PROVIDER #:        Marcellus Herrera MD          QUERY TEXT:    Patient admitted with traumatic SAH and is on chronic aspirin. If possible,   please document in the progress notes and discharge summary if you are   evaluating and/or treating any of the following: The medical record reflects the following:  Risk Factors: Fall, home ASA  Clinical Indicators: Neuro consult \"Traumatic subarachnoid hemorrhage-CT head   notable for mild subarachnoid hemorrhage on arrival.  CT head and neck with   contrast approximately 3 hours later redemonstrated small amount of acute   subarachnoid hemorrhage in left parietal region. No significant change. Hold   home ASA  Treatment: ASA held, head CT, BP monitoring    Thank you,  SHANAE Guardado, RN, [facility]  [phone]  Options provided:  -- Traumatic SAH associated with aspirin  -- Bleeding unrelated to aspirin  -- Other - I will add my own diagnosis  -- Disagree - Not applicable / Not valid  -- Disagree - Clinically unable to determine / Unknown  -- Refer to Clinical Documentation Reviewer    PROVIDER RESPONSE TEXT:    This patient has traumatic SAH associated with aspirin.     Query created by: Edgar Landis on 2022 3:05 PM      Electronically signed by:  Marcellus Herrera MD 2022 8:55 AM

## 2022-12-20 NOTE — DISCHARGE INSTR - DIET

## 2022-12-20 NOTE — DISCHARGE INSTRUCTIONS
Ok to resume aspirin on 1/2/23. Return to the ER if you begin to experience worsening headaches, nausea/vomiting, vision changes, or strokelike symptoms such as weakness and/or numbness/tingling in your arms or legs, difficulty with speech, facial droop. Do not resume blood thinners for two weeks. Do not take migraine medications containing aspirin.

## 2022-12-20 NOTE — PROGRESS NOTES
Outpatient Pharmacy Progress Note for Meds-to-Beds    The outpatient pharmacy received prescription(s) for the patient, however, the patient is going to an assisted living facility or SNF. The facility will provide the patient's home medications. The outpatient pharmacy will profile the prescriptions and have them on file. A medication list and facesheet should be provided to facility so their staff can provide the appropriate medications. Thank you for letting us serve your patients.   [facility] [place]    [address], [address] [facility]    Phone: [phone]    Fax: [phone]

## 2022-12-20 NOTE — PROGRESS NOTES
Patient sitting up in bed feeding herself, patient is alert and oriented x 4. Patient has bandage around head, staples are intact with no redness nor swelling. Patient has bed alarm on and three of the four side rails up and call light in lap for patient safety.

## 2022-12-20 NOTE — CARE COORDINATION
Dr Suellen Carranza advised that he will be placing pt's discharge order in the next hour and that pt advised him that she would like [facility] to transport her home. Cm contacted Renny Last in Admissions at [facility] and she will check with their transport to see if they will be able to pick pt up today. Awaiting return call from [facility] re; transport. 1130 Cm received return call from [facility] stating that they have no transport available today. 1150 Cm contacted [facility] back and asked if they could work out any other transport for pt by private auto or fit pt in between other w/c van trips today. Pt is on RA and been up in chair and does not need stretcher transport. Pt's niece is ill with RSV and cannot pick pt up and pt has no other family members. CM also [facility] and they are contracted with [facility].     1210 Cm received return call from [facility] admissions and their Paulino Schaefer  will be in the hospital lobby at 1400 to pick pt up and transport pt home. Pt's nurse updated that pt will need to be downstairs at 1400 and ready to go. RN reports that pt has clothing here. 1210 CM attempted to contact [facility] at ph# [phone], was connected to voice mail for [employer] [place] and message left requesting return call re; [facility]. referral. Awaiting return call.

## 2022-12-20 NOTE — PLAN OF CARE
Problem: Discharge Planning  Goal: Discharge to home or other facility with appropriate resources  12/20/2022 1225 by Stefania Velez. SALOME Abarca  Outcome: Completed  12/20/2022 0934 by Stefania Velez. SALOME Abarca  Outcome: Progressing  12/20/2022 0538 by Susu Holloway RN  Outcome: Progressing     Problem: Pain  Goal: Verbalizes/displays adequate comfort level or baseline comfort level  12/20/2022 1225 by Stefania Velez. SALOME Abarca  Outcome: Completed  12/20/2022 0934 by Stefania Velez. SALOME Abarca  Outcome: Progressing  12/20/2022 0538 by Susu Holloway RN  Outcome: Progressing     Problem: Safety - Adult  Goal: Free from fall injury  12/20/2022 1225 by Stefania Velez. SALOME Abarca  Outcome: Completed  12/20/2022 0934 by Stefania Velez. SALOME Abarca  Outcome: Progressing  12/20/2022 0538 by Susu Holloway RN  Outcome: Progressing     Problem: ABCDS Injury Assessment  Goal: Absence of physical injury  12/20/2022 1225 by Stefania Velez. SALOME Abarca  Outcome: Completed  12/20/2022 0934 by Stefania Velez. SALOME Abarca  Outcome: Progressing  12/20/2022 0538 by Susu Holloway RN  Outcome: Progressing     Problem: Skin/Tissue Integrity  Goal: Absence of new skin breakdown  Description: 1. Monitor for areas of redness and/or skin breakdown  2. Assess vascular access sites hourly  3. Every 4-6 hours minimum:  Change oxygen saturation probe site  4. Every 4-6 hours:  If on nasal continuous positive airway pressure, respiratory therapy assess nares and determine need for appliance change or resting period. 12/20/2022 1225 by Stefania Velez. SALOME Abarca  Outcome: Completed  12/20/2022 0934 by Stefania Velez.  Tomasa Reaves RN  Outcome: Progressing  12/20/2022 0538 by Susu Holloway RN  Outcome: Progressing

## 2022-12-20 NOTE — PROGRESS NOTES
This nurse went over discharge instructions with patient face to face for 30 minutes and had patient do teach back to me. Patient understand not to take Asprin till Jan 2 and to see Dr. Sara Campos before taking Asprin. Patient also understands when having a headache, N/V, and vision changes to return to the ER.

## 2022-12-20 NOTE — PROGRESS NOTES
Occupational Therapy      Occupational Therapy Treatment Note    Name: Minesh Dias MRN: [id number] :   1923   Date:  2022   Admission Date: 2022 Room:  -A     Primary Problem: Lt parietal subarachnoid hemorrhage     Restrictions/Precautions: General Precautions, Fall Risk, Telemetry, Pulse Ox, BP cuff, Bed/chair alarm    Communication with other providers: SALOME Jackson     Subjective:  Patient states: \"Do you think this is a good fashion statement? \" (Pt stated jokingly in reference to gauze bandage on head)  Pain: Pt denied pain this date at rest    Objective:    Observation: Pt received in supine upon OT arrival. Pleasant and agreeable to treatment. Objective Measures: Orientation WFL, stable HR throughout session    Treatment, including education:  Self Care Training:   Cues were given for safety, sequence, UE/LE placement, visual cues, and balance. Therapeutic Activity Training:   Therapeutic activity training was instructed today. Cues were given for safety, sequence, UE/LE placement, awareness, and balance. Pt received in supine upon OT arrival. Pt re-educated on role of OT, POC, and importance of OOB activity. Pt transferred supine to sitting EOB SBA with HOB elevated to 20'. Pt able to sit at EOB level SBA with good static and dynamic sitting balance. Pt completed sit to stand transfer from bed CGA with min cues for safe hand placement. Pt ambulated household distance to bathroom for toileting CGA with RW. Pt approached toilet, and transferred stand to sit CGA with cues for use of grab bar. Pt urinated and had loose bowel movement while seated on regular toilet. Pt completed task CGA. Steadying assist required for mgmt of old brief down prior to task and for mgmt of clean brief up to hips after task. Able to complete seated melony care without assist. Pt donned clean brief seated on toilet CGA.  Able to lean forward to begin threading legs through brief openings and manage to knees in sitting without assist. Pt stood from toilet CGA, and required CGA for steadying with brief mgmt to hips as previously described. Pt completed hand hygiene in standing at sink CGA with setup and min cues for safe RW placement. Following ADLs, pt ambulated ~100 ft in hallway CGA progressing to SBA with RW. Pt returned to room and transferred stand to sit to recliner SBA with cues for reaching back with arms. Pt left positioned for comfort in chair with all lines intact, all needs within reach, and chair alarm on. Assessment / Impression:    Patient's tolerance of treatment: Well  Adverse Reaction: None  Significant change in status and impact: Improved alertness and functional activity tolerance relative to initial evaluation  Barriers to improvement: None noted    Plan for Next Session:    Continue per OT POC. Pt with discharge summary in.     Time in: 1100  Time out: 1125  Timed treatment minutes: 25  Total treatment time: 25    Electronically signed by:    VAL Ramirez/L, [email or web address], [id number]

## 2022-12-21 ENCOUNTER — CARE COORDINATION (OUTPATIENT)
Dept: CASE MANAGEMENT | Age: 87
End: 2022-12-21

## 2022-12-21 ENCOUNTER — TELEPHONE (OUTPATIENT)
Dept: INTERNAL MEDICINE CLINIC | Age: 87
End: 2022-12-21

## 2022-12-21 DIAGNOSIS — I60.9 SUBARACHNOID BLEED (HCC): Primary | ICD-10-CM

## 2022-12-21 PROCEDURE — 1111F DSCHRG MED/CURRENT MED MERGE: CPT | Performed by: FAMILY MEDICINE

## 2022-12-21 NOTE — CARE COORDINATION
[facility] Care Transitions Initial Follow Up Call    Call within 2 business days of discharge: Yes    Care Transition Nurse contacted the patient by telephone to perform post hospital discharge assessment. Verified name and  with patient as identifiers. Provided introduction to self, and explanation of the Care Transition Nurse role. Patient: Josr Blanton Patient : 1923   MRN: [id number]  Reason for Admission: Fall, Subarachnoid hemorrhage. Discharge Date: 22 RARS: Readmission Risk Score: 10.1      Last Discharge [address]       Date Complaint Diagnosis Description Type Department Provider    22 Fall; Head Laceration Subarachnoid bleed ([facility] [address] . .. ED to Hosp-Admission (Discharged) (ADMITTED) [address] Levine, Susan. \Hospital Has a New Name and Outlook.\"" ICU Matthew Hough MD; Navarro oPwell,...        PMH: HTN, HTN, CKD3, RLS, Lumbar DDD    Was this an external facility discharge? No Discharge Facility: [place]    Challenges to be reviewed by the provider   Additional needs identified to be addressed with provider: No  none               Method of communication with provider: none. Spoke with Jaquan Wilkins & Colleen gibson (retired RN) HIPPA form verified. Jaquan Wilkins is starting to perk up a bit as she has gotten her appetite back. She is eating lunch now. No N&V. No fever/chills/sob/mike/chest pain. Jaquan Wilkins denies h/a  Dressing on her scalp looks clean/d&I. No drainage, no redness/swelling. Colleen Londono notices Laurence Matthews is a little slower than normal, but is not confused. She is alert & Oriented x4. Jaquan Wilkins lives alone in her home , independent living @ [facility]. Colleen Londono is helping to help her out PRN but can't be there all the time as she is  helping to care for her  who is recovering from surgery. This CTN spoke with Hector with [facility], has referral, received HH order from PCP. SOC for SN/PT & OT will be tomorrow,22. Jaquan Wilkins is now ambulating with FWW. PTA was using  cane. No LOB issues per pt/paige.  Discussed fall prevention. Emphasized importance of HFU appt needed within 7 days. She has an appt scheduled with PCP on 1/4/23. Both [address] Ald[place] decline this CTN's assistance for a sooner appt. They were told by PCP office that this is the soonest available. (due to holiday) AVS states to f/up with Neurosurgeon Dr Houston Horta. CTN placed call to Dr Zana Masters office, spoke with Keren Brannon who says she has spoken with Dr Rohit QUEVEDO, Ange Sutton says pt does not need to follow up with this office. Pt is to f/up with PCP & will need to refer to Neurologist that specializes in brain bleeds if deemed appropriate. Note pt has PCP appt 1/4/23. Niece takes her to all appts. Reviewed AVS & DC meds. Med list shows to hold ASA x 2 weeks,  pt & Niece aware,v/u. They will discuss this with PCP @ 1/4/23 appt. No other change in meds. Taking meds as prescribed. Care Transition Nurse reviewed discharge instructions, medical action plan, and red flags with patient who verbalized understanding. The patient was given an opportunity to ask questions and does not have any further questions or concerns at this time. Were discharge instructions available to patient? Yes. Reviewed appropriate site of care based on symptoms and resources available to patient including: PCP  Specialist  Urgent care clinics  [facility]  When to call [address] The patient agrees to contact the PCP office for questions related to their healthcare. Advance Care Planning:   Does patient have an Advance Directive: reviewed and current. Medication reconciliation was performed with patient, who verbalizes understanding of administration of home medications.  Medications reviewed, 1111F entered: yes    Was patient discharged with a pulse oximeter? no    Non-face-to-face services provided:  Scheduled appointment with PCP-1/4/23  Scheduled appointment with Specialist-[facility]  Communication with home health agencies or other community services the patient is currently using-[facility] @ home  Education of patient/family/caregiver/guardian to support self-management-Fall precs, watching out for  bleeding, change in mentation  Assessment and support for treatment adherence and medication management-med review    Offered patient enrollment in the Remote Patient Monitoring (RPM) program for in-home monitoring: Patient is not eligible for RPM program.    Care Transitions 24 Hour Call    Schedule Follow Up Appointment with PCP: Completed  Do you have a copy of your discharge instructions?: Yes  Do you have all of your prescriptions and are they filled?: Yes  Have you been contacted by a [facility] Pharmacist?: No  Do you feel like you have everything you need to keep you well at home?: Yes  Care Transitions Interventions     Transportation Support: Declined      DME Assistance: Declined   Disease Association: Completed               Follow Up  Future Appointments   Date Time Provider Alonzo Blake   1/4/2023  9:30 AM DO Josephine Espinoza   4/5/2023  1:30 PM DO Josephine Espinoza       Care Transition Nurse provided contact information. Plan for follow-up call in 5-7 days based on severity of symptoms and risk factors. Plan for next call: symptom management-Falls? H/a? . Lightheadedness? N&V?   follow-up appointment- PCP 1/4/23, NS/Brain bleed specialist?   medication management-Hold ASA until 1/2/23, any med changes?     Ant Molina RN [phone] Comprehensive

## 2022-12-21 NOTE — TELEPHONE ENCOUNTER
Care Transitions Initial Follow Up Call    Outreach made within 2 business days of discharge: Yes    Patient: Jo Ann Campos Patient : 1923   MRN: [id number]  Reason for Admission: There are no discharge diagnoses documented for the most recent discharge. Discharge Date: 22       Spoke with: Becky    Discharge department/facility: [facility]      TCM Interactive Patient Contact:  Was patient able to fill all prescriptions: Yes  Was patient instructed to bring all medications to the follow-up visit: Yes  Is patient taking all medications as directed in the discharge summary?  Yes  Does patient understand their discharge instructions: Yes  Does patient have questions or concerns that need addressed prior to 7-14 day follow up office visit: no    Scheduled appointment with PCP within 7-14 days    Follow Up  Future Appointments   Date Time Provider Alonzo Blake   2023  9:30 AM Ria Riley   2023  1:30 PM DO NELLA Riley       [place], [place]

## 2022-12-29 ENCOUNTER — CARE COORDINATION (OUTPATIENT)
Dept: CASE MANAGEMENT | Age: 87
End: 2022-12-29

## 2022-12-29 NOTE — CARE COORDINATION
Care Transitions Outreach Attempt      1st attempt to reach for Care Transition follow up call unsuccessful. HIPAA compliant message left requesting call back to [phone]      Patient: Joey Hall Patient : 1923 MRN: [id number]    Last Discharge  Street       Date Complaint Diagnosis Description Type Department Provider    22 Fall; Head Laceration Subarachnoid bleed ([address] . .. ED to Hosp-Admission (Discharged) (ADMITTED) [address] [facility] ICU Tena Medina MD; Abdulkadir Bauman,...          Discharge Facility: [place]    Noted following upcoming appointments from discharge chart review:   [facility] follow up appointment(s):   Future Appointments   Date Time Provider Alonzo Blake   2023  9:30 AM Oliverio Wu   2023  1:30 PM DO NELLA Wu       Non-[facility] follow up appointment(s): TRINH Howard RN [phone]

## 2023-01-04 ENCOUNTER — OFFICE VISIT (OUTPATIENT)
Dept: INTERNAL MEDICINE CLINIC | Age: 88
End: 2023-01-04

## 2023-01-04 ENCOUNTER — CARE COORDINATION (OUTPATIENT)
Dept: CASE MANAGEMENT | Age: 88
End: 2023-01-04

## 2023-01-04 VITALS
HEIGHT: 63 IN | DIASTOLIC BLOOD PRESSURE: 54 MMHG | BODY MASS INDEX: 18.36 KG/M2 | WEIGHT: 103.6 LBS | OXYGEN SATURATION: 96 % | HEART RATE: 84 BPM | SYSTOLIC BLOOD PRESSURE: 132 MMHG

## 2023-01-04 DIAGNOSIS — I10 PRIMARY HYPERTENSION: ICD-10-CM

## 2023-01-04 DIAGNOSIS — Z09 HOSPITAL DISCHARGE FOLLOW-UP: ICD-10-CM

## 2023-01-04 DIAGNOSIS — S01.01XD LACERATION OF SCALP WITHOUT FOREIGN BODY, SUBSEQUENT ENCOUNTER: ICD-10-CM

## 2023-01-04 DIAGNOSIS — N18.32 STAGE 3B CHRONIC KIDNEY DISEASE (HCC): ICD-10-CM

## 2023-01-04 DIAGNOSIS — G25.81 RESTLESS LEGS: ICD-10-CM

## 2023-01-04 DIAGNOSIS — Z48.02 ENCOUNTER FOR STAPLE REMOVAL: ICD-10-CM

## 2023-01-04 DIAGNOSIS — D64.9 ANEMIA, UNSPECIFIED TYPE: ICD-10-CM

## 2023-01-04 DIAGNOSIS — I60.9 SAH (SUBARACHNOID HEMORRHAGE) (HCC): Primary | ICD-10-CM

## 2023-01-04 RX ORDER — GABAPENTIN 100 MG/1
200 CAPSULE ORAL NIGHTLY
Qty: 180 CAPSULE | Refills: 1 | Status: SHIPPED | OUTPATIENT
Start: 2023-01-04 | End: 2023-04-04

## 2023-01-04 SDOH — ECONOMIC STABILITY: FOOD INSECURITY: WITHIN THE PAST 12 MONTHS, THE FOOD YOU BOUGHT JUST DIDN'T LAST AND YOU DIDN'T HAVE MONEY TO GET MORE.: NEVER TRUE

## 2023-01-04 SDOH — ECONOMIC STABILITY: FOOD INSECURITY: WITHIN THE PAST 12 MONTHS, YOU WORRIED THAT YOUR FOOD WOULD RUN OUT BEFORE YOU GOT MONEY TO BUY MORE.: NEVER TRUE

## 2023-01-04 ASSESSMENT — PATIENT HEALTH QUESTIONNAIRE - PHQ9
SUM OF ALL RESPONSES TO PHQ QUESTIONS 1-9: 2
SUM OF ALL RESPONSES TO PHQ9 QUESTIONS 1 & 2: 2
SUM OF ALL RESPONSES TO PHQ QUESTIONS 1-9: 2
1. LITTLE INTEREST OR PLEASURE IN DOING THINGS: 1
2. FEELING DOWN, DEPRESSED OR HOPELESS: 1
SUM OF ALL RESPONSES TO PHQ QUESTIONS 1-9: 2
SUM OF ALL RESPONSES TO PHQ QUESTIONS 1-9: 2

## 2023-01-04 ASSESSMENT — SOCIAL DETERMINANTS OF HEALTH (SDOH): HOW HARD IS IT FOR YOU TO PAY FOR THE VERY BASICS LIKE FOOD, HOUSING, MEDICAL CARE, AND HEATING?: NOT HARD AT ALL

## 2023-01-04 NOTE — CARE COORDINATION
[facility] Care Transitions Follow Up Call  Patient: Rey Briggs  Patient : 1923   MRN: <[id number]>  Reason for Admission: [facility]  Discharge Date: 22 RARS: Readmission Risk Score: 10.1    Message left in compliance with HIPPA. Stated who I was, representing the [facility], Care Transitions Team. Instructed to call PCP with any immediate health needs/questions/concerns. Looks like patient is at her PCP F/U Appointment. Follow Up  Future Appointments   Date Time Provider Alonzo Blake   2023  1:30 PM DO NELLA Borges     Plan for follow-up call in 1-2 days based on severity of symptoms and risk factors.   Plan for next call:  Faiza Allen RN

## 2023-01-04 NOTE — PROGRESS NOTES
Post-Discharge Transitional Care  Follow Up      Mary Lou Emery   YOB: 1923    Date of Office Visit:  1/4/2023  Date of Hospital Admission: 12/18/22  Date of Hospital Discharge: 12/20/22  Risk of hospital readmission (high >=14%. Medium >=10%) :Readmission Risk Score: 10.1      Care management risk score Rising risk (score 2-5) and Complex Care (Scores >=6): No Risk Score On File     Non face to face  following discharge, date last encounter closed (first attempt may have been earlier): *No documented post hospital discharge outreach found in the last 14 days    Call initiated 2 business days of discharge: *No response recorded in the last 14 days    ASSESSMENT/PLAN:   SAH (subarachnoid hemorrhage) ([facility] [address]  -     CT HEAD WO CONTRAST; Future  Laceration of scalp without foreign body, subsequent encounter  Staples removed  Anemia, unspecified type  -     CBC with Auto Differential; Future  -     Iron and TIBC; Future  -     Ferritin; Future  Restless legs  -Decrease gabapentin to 200 mg  -     gabapentin (NEURONTIN) 100 MG capsule; Take 2 capsules by mouth nightly for 90 days. , Disp-180 capsule, R-1Normal  Stage 3b chronic kidney disease ([facility] [address]  Primary hypertension  Continue medications  Encounter for staple removal  Hospital discharge follow-up  -     IL DISCHARGE MEDS RECONCILED W/ CURRENT OUTPATIENT MED LIST  Fall precautions  Medical Decision Making: moderate complexity  Return if symptoms worsen or fail to improve. On this date 1/4/2023 I have spent 30 minutes reviewing previous notes, test results and face to face with the patient discussing the diagnosis and importance of compliance with the treatment plan as well as documenting on the day of the visit.        Subjective:   HPI:  Follow up of Hospital problems/diagnosis(es): Traumatic  SAH due to fall, HTN, Head laceration    Here with Steffanie Popper  Patient has laceration to the L posterior scalp and will  need staples removed  CT Head- mild SAH due to fall. She had LOC. . No surgery. She was evaluated by Dr. Jose Angel Kennedy- Neurosurgery. She denies headache, neck pain or other symptoms. Neurosurgery- ASA on hold  RLS- she has been on gabapentin 300 mg at night. She states 100 mg does not help. Will reduce to 200 mg. Patient/ caregiver aware of SE  HTN- on HCTZ and Dilt-    Inpatient course: Discharge summary reviewed- see chart. Interval history/Current status: Stable    Patient Active Problem List   Diagnosis    Gait disturbance    History of right hip hemiarthroplasty    Primary hypertension    DDD (degenerative disc disease), lumbar    Sciatica, left side    RLS (restless legs syndrome)    Age-related osteoporosis without current pathological fracture    Insomnia    Chronic renal disease, stage III ([facility] [address] [562322]       Medications listed as ordered at the time of discharge from hospital     Medication List            Accurate as of January 4, 2023 11:59 PM. If you have any questions, ask your nurse or doctor. CONTINUE taking these medications      aspirin 81 MG EC tablet  Take 1 tablet by mouth daily     Dilt- MG extended release capsule  Generic drug: dilTIAZem  TAKE 1 CAPSULE BY MOUTH  DAILY     gabapentin 100 MG capsule  Commonly known as: NEURONTIN  Take 2 capsules by mouth nightly for 90 days.      hydroCHLOROthiazide 25 MG tablet  Commonly known as: HYDRODIURIL  TAKE 1 TABLET BY MOUTH  DAILY     melatonin 5 MG Tabs tablet               Where to Get Your Medications        These medications were sent to [facility] ([address] [facility], [address] [address]      Phone: [phone]   gabapentin 100 MG capsule           Medications marked \"taking\" at this time  Outpatient Medications Marked as Taking for the 1/4/23 encounter (Office Visit) with Cheryl Agosto, DO   Medication Sig Dispense Refill    gabapentin (NEURONTIN) 100 MG capsule Take 2 capsules by mouth nightly for 90 days. 180 capsule 1    DILT- MG extended release capsule TAKE 1 CAPSULE BY MOUTH  DAILY 90 capsule 1    hydroCHLOROthiazide (HYDRODIURIL) 25 MG tablet TAKE 1 TABLET BY MOUTH  DAILY 90 tablet 1    melatonin 5 MG TABS tablet Take 5 mg by mouth nightly          Medications patient taking as of now reconciled against medications ordered at time of hospital discharge: Yes    A comprehensive review of systems was negative except for what was noted in the HPI.    12/19/22 CT Head wo Contrast  Impression   Unchanged small subarachnoid hemorrhage overlying the left frontoparietal   region. No acute interval change. 12/18/22 CTA Head Neck with Contrast  Impression   1. No focal hemodynamically significant stenosis, occlusion or aneurysm in   the large arteries of the head and neck. 2.  Redemonstration of a small amount of acute subarachnoid hemorrhage in the   left parietal region. 12/18/22 XR Chest Portable  Impression   No acute cardiopulmonary disease.          Lab Results   Component Value Date    WBC 12.4 (H) 12/18/2022    HGB 11.7 (L) 12/18/2022    HCT 37.2 12/18/2022    MCV 95.1 12/18/2022     12/18/2022     Lab Results   Component Value Date/Time     12/19/2022 05:00 AM    K 4.9 12/19/2022 05:00 AM     12/19/2022 05:00 AM    CO2 23 12/19/2022 05:00 AM    BUN 26 12/19/2022 05:00 AM    CREATININE 0.9 12/19/2022 05:00 AM    GLUCOSE 89 12/19/2022 05:00 AM    CALCIUM 8.4 12/19/2022 05:00 AM      Lab Results   Component Value Date/Time    MG 2.2 12/19/2022 09:28 AM       Objective:    BP (!) 132/54 (Site: Left Upper Arm, Position: Sitting, Cuff Size: Medium Adult)   Pulse 84   Ht 5' 2.5\" (1.588 m)   Wt 103 lb 9.6 oz (47 kg)   SpO2 96%   BMI 18.65 kg/m²   General Appearance: alert and oriented to person, place and time  Head: abnormal- Laceration to back of L posterior head   Eyes: pupils equal, round, and reactive to light and extraocular eye movements intact  ENT: bilateral tympanic membrane normal  Neck: neck supple and non tender without mass   Pulmonary/Chest: clear to auscultation bilaterally- no wheezes, rales or rhonchi, normal air movement, no respiratory distress  Cardiovascular: normal rate, normal S1 and S2, and no carotid bruits  Abdomen: soft, non-tender and non-distended  Extremities:+ edema-  bilateral   Neurologic: no cranial nerve deficit, gait and coordination normal, and speech normal      An electronic signature was used to authenticate this note.   --Jessie Petty, DO

## 2023-01-12 ENCOUNTER — CARE COORDINATION (OUTPATIENT)
Dept: CASE MANAGEMENT | Age: 88
End: 2023-01-12

## 2023-01-12 NOTE — CARE COORDINATION
Care Transitions Outreach Attempt    2nd unsuccessful attempt to reach for Care Transition follow up call. HIPAA compliant message left requesting call back to [phone]. No call back received, therefore episode closed per protocol, no further outreach scheduled. Unable to reach letter done- requested CC Support, Michi to please mail out    Patient: Beata Maynard Patient : 1923 MRN: [id number]    Last Discharge [address]       Date Complaint Diagnosis Description Type Department Provider    22 Fall; Head Laceration Subarachnoid bleed ([facility] [address] . .. ED to Hosp-Admission (Discharged) (ADMITTED) [facility] ICU Pascual Rajput MD; Kosta Alegria,... Noted Sharon Lias had follow up appt with PCP Dr Kamila Feliz on 23.        Noted following upcoming appointments from discharge chart review:   [facility] follow up appointment(s):   Future Appointments   Date Time Provider Alonzo Blake   2023  1:00 PM [facility] CT ROOM 1 [address] [facility] [address] [facility] Imaging   2023  1:30 PM Eual Schirmer, DO [address] [facility]     Non-[facility] follow up appointment(s): TRINH Groves RN [phone]

## 2023-01-12 NOTE — LETTER
[facility] Case Management  [address] [address]  Phone: [phone]    Manny Rivero RN        January 12, 2023      Dear Atanacio Sandhoff    My name is Gilford Melena and I am a Care Transition Nurse who partners with Dr Pedro simon to improve patients' health. I've been trying to reach you via phone to let you know that, as a member of your care team, I will work with other providers involved in your care, offer education for your specific health conditions, and connect you with more resources as needed. This program is designed to provide you with the opportunity to have a ([facility]/[address]) care manager partner with you for the following situations:     1) if you come home from the hospital or emergency room   2) to help manage your disease   3) when you would like assistance coordinating services or appointments    This added support is provided at no additional cost to you. My primary focus is to help you achieve specific goals and improve your health. Please call me at [phone] if you'd like to further discuss how I can support your health care needs.     Sincerely,        Manny Rivero RN

## 2023-02-07 ENCOUNTER — HOSPITAL ENCOUNTER (OUTPATIENT)
Age: 88
Discharge: HOME | End: 2023-02-07
Payer: MEDICARE

## 2023-02-07 ENCOUNTER — HOSPITAL ENCOUNTER (OUTPATIENT)
Dept: CT IMAGING | Age: 88
Discharge: HOME | End: 2023-02-07
Payer: MEDICARE

## 2023-02-07 DIAGNOSIS — I60.9 SAH (SUBARACHNOID HEMORRHAGE) (HCC): ICD-10-CM

## 2023-02-07 PROCEDURE — 83540 ASSAY OF IRON: CPT

## 2023-02-07 PROCEDURE — 70450 CT HEAD/BRAIN W/O DYE: CPT

## 2023-02-13 ENCOUNTER — TELEPHONE (OUTPATIENT)
Dept: INTERNAL MEDICINE CLINIC | Age: 88
End: 2023-02-13

## 2023-02-13 NOTE — TELEPHONE ENCOUNTER
Spoke to [employer]. Discussed stopping the ASA. The patient wants to stay on it. She will use twice a week.  She is aware of the risks

## 2023-02-13 NOTE — TELEPHONE ENCOUNTER
Pts POA called stating that the Pt wants to restart her baby aspirin and wants to know if she can. Pt also stated to the POA that she isn't bruising as much being off of it and wants to know if she restarts it to take it every other day. Please advise.

## 2023-02-17 ENCOUNTER — NURSE ONLY (OUTPATIENT)
Dept: INTERNAL MEDICINE CLINIC | Age: 88
End: 2023-02-17

## 2023-02-17 DIAGNOSIS — D64.9 ANEMIA, UNSPECIFIED TYPE: Primary | ICD-10-CM

## 2023-02-17 LAB
BASOPHILS ABSOLUTE: 0.1 K/UL (ref 0–0.2)
BASOPHILS RELATIVE PERCENT: 0.7 %
EOSINOPHILS ABSOLUTE: 0.1 K/UL (ref 0–0.6)
EOSINOPHILS RELATIVE PERCENT: 1 %
FERRITIN: 72.8 NG/ML (ref 15–150)
HCT VFR BLD CALC: 37.3 % (ref 36–48)
HEMOGLOBIN: 12.3 G/DL (ref 12–16)
IRON SATURATION: 25 % (ref 15–50)
IRON: 85 UG/DL (ref 37–145)
LYMPHOCYTES ABSOLUTE: 1.7 K/UL (ref 1–5.1)
LYMPHOCYTES RELATIVE PERCENT: 22.8 %
MCH RBC QN AUTO: 30.2 PG (ref 26–34)
MCHC RBC AUTO-ENTMCNC: 32.8 G/DL (ref 31–36)
MCV RBC AUTO: 92.1 FL (ref 80–100)
MONOCYTES ABSOLUTE: 0.4 K/UL (ref 0–1.3)
MONOCYTES RELATIVE PERCENT: 5.9 %
NEUTROPHILS ABSOLUTE: 5.1 K/UL (ref 1.7–7.7)
NEUTROPHILS RELATIVE PERCENT: 69.6 %
PDW BLD-RTO: 14.3 % (ref 12.4–15.4)
PLATELET # BLD: 183 K/UL (ref 135–450)
PMV BLD AUTO: 9.9 FL (ref 5–10.5)
RBC # BLD: 4.05 M/UL (ref 4–5.2)
TOTAL IRON BINDING CAPACITY: 336 UG/DL (ref 260–445)
WBC # BLD: 7.3 K/UL (ref 4–11)

## 2023-02-22 ENCOUNTER — TELEPHONE (OUTPATIENT)
Dept: INTERNAL MEDICINE CLINIC | Age: 88
End: 2023-02-22

## 2023-02-27 ENCOUNTER — TELEPHONE (OUTPATIENT)
Dept: INTERNAL MEDICINE CLINIC | Age: 88
End: 2023-02-27

## 2023-03-29 ENCOUNTER — NURSE ONLY (OUTPATIENT)
Dept: INTERNAL MEDICINE CLINIC | Age: 88
End: 2023-03-29
Payer: MEDICARE

## 2023-03-29 DIAGNOSIS — N18.32 STAGE 3B CHRONIC KIDNEY DISEASE (HCC): Primary | ICD-10-CM

## 2023-03-29 LAB
ANION GAP SERPL CALCULATED.3IONS-SCNC: 14 MMOL/L (ref 3–16)
BUN SERPL-MCNC: 27 MG/DL (ref 7–20)
CALCIUM SERPL-MCNC: 9 MG/DL (ref 8.3–10.6)
CHLORIDE SERPL-SCNC: 104 MMOL/L (ref 99–110)
CO2 SERPL-SCNC: 23 MMOL/L (ref 21–32)
CREAT SERPL-MCNC: 1.2 MG/DL (ref 0.6–1.2)
GFR SERPLBLD CREATININE-BSD FMLA CKD-EPI: 41 ML/MIN/{1.73_M2}
GLUCOSE SERPL-MCNC: 164 MG/DL (ref 70–99)
POTASSIUM SERPL-SCNC: 5.1 MMOL/L (ref 3.5–5.1)
SODIUM SERPL-SCNC: 141 MMOL/L (ref 136–145)

## 2023-03-29 PROCEDURE — 36415 COLL VENOUS BLD VENIPUNCTURE: CPT | Performed by: FAMILY MEDICINE

## 2023-03-29 PROCEDURE — 99999 PR OFFICE/OUTPT VISIT,PROCEDURE ONLY: CPT | Performed by: FAMILY MEDICINE

## 2023-04-24 ENCOUNTER — APPOINTMENT (OUTPATIENT)
Dept: CT IMAGING | Age: 88
End: 2023-04-24
Payer: MEDICARE

## 2023-04-24 ENCOUNTER — APPOINTMENT (OUTPATIENT)
Dept: GENERAL RADIOLOGY | Age: 88
End: 2023-04-24
Payer: MEDICARE

## 2023-04-24 ENCOUNTER — HOSPITAL ENCOUNTER (EMERGENCY)
Age: 88
Discharge: ANOTHER HOSPITAL | End: 2023-04-24
Attending: STUDENT IN AN ORGANIZED HEALTH CARE EDUCATION/TRAINING PROGRAM
Payer: MEDICARE

## 2023-04-24 VITALS
HEIGHT: 64 IN | RESPIRATION RATE: 25 BRPM | TEMPERATURE: 97.2 F | SYSTOLIC BLOOD PRESSURE: 135 MMHG | OXYGEN SATURATION: 96 % | HEART RATE: 93 BPM | BODY MASS INDEX: 17.42 KG/M2 | WEIGHT: 102 LBS | DIASTOLIC BLOOD PRESSURE: 68 MMHG

## 2023-04-24 DIAGNOSIS — Z96.649 PERIPROSTHETIC FRACTURE AROUND INTERNAL PROSTHETIC HIP JOINT, INITIAL ENCOUNTER: Primary | ICD-10-CM

## 2023-04-24 DIAGNOSIS — M97.8XXA PERIPROSTHETIC FRACTURE AROUND INTERNAL PROSTHETIC HIP JOINT, INITIAL ENCOUNTER: Primary | ICD-10-CM

## 2023-04-24 DIAGNOSIS — S72.001A CLOSED FRACTURE OF RIGHT HIP, INITIAL ENCOUNTER (HCC): ICD-10-CM

## 2023-04-24 LAB
ANION GAP SERPL CALCULATED.3IONS-SCNC: 14 MMOL/L (ref 4–16)
APTT: 27.9 SECONDS (ref 25.1–37.1)
BASOPHILS ABSOLUTE: 0 K/CU MM
BASOPHILS RELATIVE PERCENT: 0.5 % (ref 0–1)
BUN SERPL-MCNC: 33 MG/DL (ref 6–23)
CALCIUM SERPL-MCNC: 9.1 MG/DL (ref 8.3–10.6)
CHLORIDE BLD-SCNC: 101 MMOL/L (ref 99–110)
CO2: 24 MMOL/L (ref 21–32)
CREAT SERPL-MCNC: 1.1 MG/DL (ref 0.6–1.1)
DIFFERENTIAL TYPE: ABNORMAL
EOSINOPHILS ABSOLUTE: 0 K/CU MM
EOSINOPHILS RELATIVE PERCENT: 0.5 % (ref 0–3)
GFR SERPL CREATININE-BSD FRML MDRD: ABNORMAL ML/MIN/1.73M2
GLUCOSE SERPL-MCNC: 153 MG/DL (ref 70–99)
HCT VFR BLD CALC: 39.1 % (ref 37–47)
HEMOGLOBIN: 12.2 GM/DL (ref 12.5–16)
IMMATURE NEUTROPHIL %: 0.5 % (ref 0–0.43)
INR BLD: 0.85 INDEX
LYMPHOCYTES ABSOLUTE: 1.2 K/CU MM
LYMPHOCYTES RELATIVE PERCENT: 15.7 % (ref 24–44)
MAGNESIUM: 2 MG/DL (ref 1.8–2.4)
MCH RBC QN AUTO: 29.3 PG (ref 27–31)
MCHC RBC AUTO-ENTMCNC: 31.2 % (ref 32–36)
MCV RBC AUTO: 94 FL (ref 78–100)
MONOCYTES ABSOLUTE: 0.3 K/CU MM
MONOCYTES RELATIVE PERCENT: 3.7 % (ref 0–4)
NUCLEATED RBC %: 0 %
PDW BLD-RTO: 15.2 % (ref 11.7–14.9)
PLATELET # BLD: 202 K/CU MM (ref 140–440)
PMV BLD AUTO: 10 FL (ref 7.5–11.1)
POTASSIUM SERPL-SCNC: 4.2 MMOL/L (ref 3.5–5.1)
PROTHROMBIN TIME: 10.8 SECONDS (ref 11.7–14.5)
RBC # BLD: 4.16 M/CU MM (ref 4.2–5.4)
SEGMENTED NEUTROPHILS ABSOLUTE COUNT: 6.1 K/CU MM
SEGMENTED NEUTROPHILS RELATIVE PERCENT: 79.1 % (ref 36–66)
SODIUM BLD-SCNC: 139 MMOL/L (ref 135–145)
TOTAL IMMATURE NEUTOROPHIL: 0.04 K/CU MM
TOTAL NUCLEATED RBC: 0 K/CU MM
TROPONIN T: <0.01 NG/ML
TSH SERPL DL<=0.005 MIU/L-ACNC: 1.97 UIU/ML (ref 0.27–4.2)
WBC # BLD: 7.7 K/CU MM (ref 4–10.5)

## 2023-04-24 PROCEDURE — 96375 TX/PRO/DX INJ NEW DRUG ADDON: CPT

## 2023-04-24 PROCEDURE — 96376 TX/PRO/DX INJ SAME DRUG ADON: CPT

## 2023-04-24 PROCEDURE — 85610 PROTHROMBIN TIME: CPT

## 2023-04-24 PROCEDURE — 83735 ASSAY OF MAGNESIUM: CPT

## 2023-04-24 PROCEDURE — 76376 3D RENDER W/INTRP POSTPROCES: CPT

## 2023-04-24 PROCEDURE — 71260 CT THORAX DX C+: CPT

## 2023-04-24 PROCEDURE — 73502 X-RAY EXAM HIP UNI 2-3 VIEWS: CPT

## 2023-04-24 PROCEDURE — 85730 THROMBOPLASTIN TIME PARTIAL: CPT

## 2023-04-24 PROCEDURE — 80048 BASIC METABOLIC PNL TOTAL CA: CPT

## 2023-04-24 PROCEDURE — 70450 CT HEAD/BRAIN W/O DYE: CPT

## 2023-04-24 PROCEDURE — 96374 THER/PROPH/DIAG INJ IV PUSH: CPT

## 2023-04-24 PROCEDURE — 6360000004 HC RX CONTRAST MEDICATION: Performed by: STUDENT IN AN ORGANIZED HEALTH CARE EDUCATION/TRAINING PROGRAM

## 2023-04-24 PROCEDURE — 85025 COMPLETE CBC W/AUTO DIFF WBC: CPT

## 2023-04-24 PROCEDURE — 6360000002 HC RX W HCPCS: Performed by: STUDENT IN AN ORGANIZED HEALTH CARE EDUCATION/TRAINING PROGRAM

## 2023-04-24 PROCEDURE — 84443 ASSAY THYROID STIM HORMONE: CPT

## 2023-04-24 PROCEDURE — 99285 EMERGENCY DEPT VISIT HI MDM: CPT

## 2023-04-24 PROCEDURE — 6370000000 HC RX 637 (ALT 250 FOR IP): Performed by: STUDENT IN AN ORGANIZED HEALTH CARE EDUCATION/TRAINING PROGRAM

## 2023-04-24 PROCEDURE — 84484 ASSAY OF TROPONIN QUANT: CPT

## 2023-04-24 PROCEDURE — 93005 ELECTROCARDIOGRAM TRACING: CPT | Performed by: STUDENT IN AN ORGANIZED HEALTH CARE EDUCATION/TRAINING PROGRAM

## 2023-04-24 PROCEDURE — 72125 CT NECK SPINE W/O DYE: CPT

## 2023-04-24 RX ORDER — MORPHINE SULFATE 4 MG/ML
4 INJECTION, SOLUTION INTRAMUSCULAR; INTRAVENOUS ONCE
Status: COMPLETED | OUTPATIENT
Start: 2023-04-24 | End: 2023-04-24

## 2023-04-24 RX ORDER — HYDROCHLOROTHIAZIDE 25 MG/1
25 TABLET ORAL ONCE
Status: COMPLETED | OUTPATIENT
Start: 2023-04-24 | End: 2023-04-24

## 2023-04-24 RX ORDER — ONDANSETRON 2 MG/ML
4 INJECTION INTRAMUSCULAR; INTRAVENOUS ONCE
Status: COMPLETED | OUTPATIENT
Start: 2023-04-24 | End: 2023-04-24

## 2023-04-24 RX ADMIN — ONDANSETRON 4 MG: 2 INJECTION INTRAMUSCULAR; INTRAVENOUS at 13:29

## 2023-04-24 RX ADMIN — MORPHINE SULFATE 4 MG: 4 INJECTION, SOLUTION INTRAMUSCULAR; INTRAVENOUS at 19:32

## 2023-04-24 RX ADMIN — HYDROCHLOROTHIAZIDE 25 MG: 25 TABLET ORAL at 19:13

## 2023-04-24 RX ADMIN — IOPAMIDOL 80 ML: 755 INJECTION, SOLUTION INTRAVENOUS at 14:50

## 2023-04-24 RX ADMIN — MORPHINE SULFATE 4 MG: 4 INJECTION, SOLUTION INTRAMUSCULAR; INTRAVENOUS at 13:28

## 2023-04-24 ASSESSMENT — PAIN DESCRIPTION - DESCRIPTORS
DESCRIPTORS: ACHING;STABBING
DESCRIPTORS: ACHING;SHARP

## 2023-04-24 ASSESSMENT — PAIN - FUNCTIONAL ASSESSMENT: PAIN_FUNCTIONAL_ASSESSMENT: WONG-BAKER FACES

## 2023-04-24 ASSESSMENT — PAIN DESCRIPTION - LOCATION
LOCATION: HIP

## 2023-04-24 ASSESSMENT — PAIN DESCRIPTION - ORIENTATION
ORIENTATION: RIGHT
ORIENTATION: RIGHT

## 2023-04-24 ASSESSMENT — PAIN SCALES - GENERAL
PAINLEVEL_OUTOF10: 10

## 2023-04-24 ASSESSMENT — PAIN DESCRIPTION - PAIN TYPE: TYPE: ACUTE PAIN

## 2023-04-24 ASSESSMENT — PAIN DESCRIPTION - FREQUENCY: FREQUENCY: CONTINUOUS

## 2023-04-24 NOTE — ED NOTES
Transfer to [facility] 60: [address]   Unit: [place]    Room: 55  Reason: Periprosthetic Hip Fx  Nurse to nurse report: [phone]  Attending: Dr Magdy Jaquez  04/24/23 1387

## 2023-04-24 NOTE — ED PROVIDER NOTES
or intra-abdominal traumatic injury. Inferior endplate compression deformity involving L1 is age indeterminate but   does not appear acute. Otherwise no evidence of fracture or dislocation   involving thoracic or lumbar spine. CT Head W/O Contrast   Preliminary Result   No acute intracranial abnormality. Cerebral atrophy. Severe chronic small vessel ischemic changes. Previous   area of injury or insult in the left parietal lobe. CT CHEST ABDOMEN PELVIS W CONTRAST Additional Contrast? None   Final Result   Marked intrahepatic and extrahepatic biliary dilation along with pancreatic   duct dilation with abnormal high density within the region of the distal   common bile duct may represent stone, sludge, or soft tissue mass. Consider   MRCP or ERCP. No CT evidence of intrathoracic or intra-abdominal traumatic injury. Inferior endplate compression deformity involving L1 is age indeterminate but   does not appear acute. Otherwise no evidence of fracture or dislocation   involving thoracic or lumbar spine. CT CSpine W/O Contrast   Final Result   1. No acute abnormality of the cervical spine. 2. Multilevel mild-to-moderate degenerative change throughout the cervical   spine. XR HIP 2-3 VW W PELVIS RIGHT   Final Result   Prior right hip hemiarthroplasty. There is an acute, mildly comminuted and   displaced right periprosthetic fracture. The major distal fracture component   is approximately 8 cm proximally displaced and 3.5 cm anteriorly displaced. There is varus and dorsal angulation of the major fracture components. EKG (if obtained): (All EKG's are interpreted by myself in the absence of a cardiologist)    CRITICAL CARE NOTE:  There was a high probability of clinically significant life-threatening deterioration of the patient's condition requiring my urgent intervention due to trauma. Trauma evaluation was performed to address this.    Total

## 2023-04-25 LAB
EKG ATRIAL RATE: 90 BPM
EKG DIAGNOSIS: NORMAL
EKG P AXIS: 93 DEGREES
EKG P-R INTERVAL: 108 MS
EKG Q-T INTERVAL: 376 MS
EKG QRS DURATION: 76 MS
EKG QTC CALCULATION (BAZETT): 459 MS
EKG R AXIS: -36 DEGREES
EKG T AXIS: 74 DEGREES
EKG VENTRICULAR RATE: 90 BPM

## 2023-04-25 PROCEDURE — 93010 ELECTROCARDIOGRAM REPORT: CPT | Performed by: INTERNAL MEDICINE

## 2023-05-01 ENCOUNTER — INPATIENT HOSPITAL (OUTPATIENT)
Dept: URBAN - METROPOLITAN AREA HOSPITAL 138 | Facility: HOSPITAL | Age: 88
End: 2023-05-01
Payer: MEDICARE

## 2023-05-01 DIAGNOSIS — T40.2X5A ADVERSE EFFECT OF OTHER OPIOIDS, INITIAL ENCOUNTER: ICD-10-CM

## 2023-05-01 DIAGNOSIS — K59.03 DRUG INDUCED CONSTIPATION: ICD-10-CM

## 2023-05-01 DIAGNOSIS — R13.10 DYSPHAGIA, UNSPECIFIED: ICD-10-CM

## 2023-05-01 PROCEDURE — 99222 1ST HOSP IP/OBS MODERATE 55: CPT | Mod: GC | Performed by: INTERNAL MEDICINE

## 2023-05-02 PROCEDURE — 99232 SBSQ HOSP IP/OBS MODERATE 35: CPT | Mod: GC | Performed by: INTERNAL MEDICINE

## 2023-05-04 ENCOUNTER — INPATIENT HOSPITAL (OUTPATIENT)
Dept: URBAN - METROPOLITAN AREA HOSPITAL 138 | Facility: HOSPITAL | Age: 88
End: 2023-05-04
Payer: MEDICARE

## 2023-05-04 DIAGNOSIS — R13.10 DYSPHAGIA, UNSPECIFIED: ICD-10-CM

## 2023-05-04 DIAGNOSIS — K25.9 GASTRIC ULCER, UNSPECIFIED AS ACUTE OR CHRONIC, WITHOUT HEMO: ICD-10-CM

## 2023-05-04 PROCEDURE — 99233 SBSQ HOSP IP/OBS HIGH 50: CPT | Mod: GC | Performed by: INTERNAL MEDICINE

## 2023-05-05 PROCEDURE — 43239 EGD BIOPSY SINGLE/MULTIPLE: CPT | Mod: 59 | Performed by: INTERNAL MEDICINE

## 2023-05-05 PROCEDURE — 43248 EGD GUIDE WIRE INSERTION: CPT | Performed by: INTERNAL MEDICINE

## 2023-05-06 ENCOUNTER — INPATIENT HOSPITAL (OUTPATIENT)
Dept: URBAN - METROPOLITAN AREA HOSPITAL 138 | Facility: HOSPITAL | Age: 88
End: 2023-05-06
Payer: MEDICARE

## 2023-05-06 DIAGNOSIS — K20.80 OTHER ESOPHAGITIS WITHOUT BLEEDING: ICD-10-CM

## 2023-05-06 DIAGNOSIS — K29.80 DUODENITIS WITHOUT BLEEDING: ICD-10-CM

## 2023-05-06 DIAGNOSIS — R13.10 DYSPHAGIA, UNSPECIFIED: ICD-10-CM

## 2023-05-06 PROCEDURE — 99232 SBSQ HOSP IP/OBS MODERATE 35: CPT | Performed by: INTERNAL MEDICINE

## 2023-05-08 ENCOUNTER — HOSPITAL ENCOUNTER (OUTPATIENT)
Age: 88
Setting detail: SPECIMEN
Discharge: HOME | End: 2023-05-08

## 2023-05-08 LAB
ALBUMIN SERPL-MCNC: 2.3 GM/DL (ref 3.4–5)
ALP BLD-CCNC: 137 IU/L (ref 40–128)
ALT SERPL-CCNC: 8 U/L (ref 10–40)
ANION GAP SERPL CALCULATED.3IONS-SCNC: 9 MMOL/L (ref 4–16)
AST SERPL-CCNC: 17 IU/L (ref 15–37)
BILIRUB SERPL-MCNC: 0.9 MG/DL (ref 0–1)
BUN SERPL-MCNC: 19 MG/DL (ref 6–23)
CALCIUM SERPL-MCNC: 7.5 MG/DL (ref 8.3–10.6)
CHLORIDE BLD-SCNC: 105 MMOL/L (ref 99–110)
CO2: 25 MMOL/L (ref 21–32)
CREAT SERPL-MCNC: 0.8 MG/DL (ref 0.6–1.1)
GFR SERPL CREATININE-BSD FRML MDRD: >60 ML/MIN/1.73M2
GLUCOSE SERPL-MCNC: 80 MG/DL (ref 70–99)
HCT VFR BLD CALC: 26.3 % (ref 37–47)
HEMOGLOBIN: 8 GM/DL (ref 12.5–16)
MCH RBC QN AUTO: 29.4 PG (ref 27–31)
MCHC RBC AUTO-ENTMCNC: 30.4 % (ref 32–36)
MCV RBC AUTO: 96.7 FL (ref 78–100)
PDW BLD-RTO: 18.6 % (ref 11.7–14.9)
PLATELET # BLD: 334 K/CU MM (ref 140–440)
PMV BLD AUTO: 10.1 FL (ref 7.5–11.1)
POTASSIUM SERPL-SCNC: 4.5 MMOL/L (ref 3.5–5.1)
RBC # BLD: 2.72 M/CU MM (ref 4.2–5.4)
SODIUM BLD-SCNC: 139 MMOL/L (ref 135–145)
TOTAL PROTEIN: 4.3 GM/DL (ref 6.4–8.2)
WBC # BLD: 10 K/CU MM (ref 4–10.5)

## 2023-05-08 PROCEDURE — 80053 COMPREHEN METABOLIC PANEL: CPT

## 2023-05-08 PROCEDURE — 85027 COMPLETE CBC AUTOMATED: CPT

## 2023-05-15 ENCOUNTER — HOSPITAL ENCOUNTER (OUTPATIENT)
Age: 88
Setting detail: SPECIMEN
Discharge: HOME | End: 2023-05-15

## 2023-05-15 LAB
ANION GAP SERPL CALCULATED.3IONS-SCNC: 10 MMOL/L (ref 4–16)
BUN SERPL-MCNC: 22 MG/DL (ref 6–23)
CALCIUM SERPL-MCNC: 7.8 MG/DL (ref 8.3–10.6)
CHLORIDE BLD-SCNC: 109 MMOL/L (ref 99–110)
CO2: 22 MMOL/L (ref 21–32)
CREAT SERPL-MCNC: 0.9 MG/DL (ref 0.6–1.1)
GFR SERPL CREATININE-BSD FRML MDRD: 57 ML/MIN/1.73M2
GLUCOSE SERPL-MCNC: 89 MG/DL (ref 70–99)
HCT VFR BLD CALC: 29.1 % (ref 37–47)
HEMOGLOBIN: 8.6 GM/DL (ref 12.5–16)
MCH RBC QN AUTO: 29.5 PG (ref 27–31)
MCHC RBC AUTO-ENTMCNC: 29.6 % (ref 32–36)
MCV RBC AUTO: 99.7 FL (ref 78–100)
PDW BLD-RTO: 19.6 % (ref 11.7–14.9)
PLATELET # BLD: 358 K/CU MM (ref 140–440)
PMV BLD AUTO: 10 FL (ref 7.5–11.1)
POTASSIUM SERPL-SCNC: 4.5 MMOL/L (ref 3.5–5.1)
RBC # BLD: 2.92 M/CU MM (ref 4.2–5.4)
SODIUM BLD-SCNC: 141 MMOL/L (ref 135–145)
WBC # BLD: 6.5 K/CU MM (ref 4–10.5)

## 2023-05-15 PROCEDURE — 85027 COMPLETE CBC AUTOMATED: CPT

## 2023-05-15 PROCEDURE — 80048 BASIC METABOLIC PNL TOTAL CA: CPT

## 2023-05-22 ENCOUNTER — HOSPITAL ENCOUNTER (OUTPATIENT)
Age: 88
Setting detail: SPECIMEN
Discharge: HOME | End: 2023-05-22

## 2023-05-22 LAB
ANION GAP SERPL CALCULATED.3IONS-SCNC: 8 MMOL/L (ref 4–16)
BUN SERPL-MCNC: 27 MG/DL (ref 6–23)
CALCIUM SERPL-MCNC: 7.9 MG/DL (ref 8.3–10.6)
CHLORIDE BLD-SCNC: 107 MMOL/L (ref 99–110)
CO2: 26 MMOL/L (ref 21–32)
CREAT SERPL-MCNC: 0.9 MG/DL (ref 0.6–1.1)
GFR SERPL CREATININE-BSD FRML MDRD: 57 ML/MIN/1.73M2
GLUCOSE SERPL-MCNC: 83 MG/DL (ref 70–99)
HCT VFR BLD CALC: 29 % (ref 37–47)
HEMOGLOBIN: 8.6 GM/DL (ref 12.5–16)
MCH RBC QN AUTO: 29.6 PG (ref 27–31)
MCHC RBC AUTO-ENTMCNC: 29.7 % (ref 32–36)
MCV RBC AUTO: 99.7 FL (ref 78–100)
PDW BLD-RTO: 18.5 % (ref 11.7–14.9)
PLATELET # BLD: 273 K/CU MM (ref 140–440)
PMV BLD AUTO: 9.4 FL (ref 7.5–11.1)
POTASSIUM SERPL-SCNC: 4.2 MMOL/L (ref 3.5–5.1)
RBC # BLD: 2.91 M/CU MM (ref 4.2–5.4)
SODIUM BLD-SCNC: 141 MMOL/L (ref 135–145)
WBC # BLD: 6.1 K/CU MM (ref 4–10.5)

## 2023-05-22 PROCEDURE — 80048 BASIC METABOLIC PNL TOTAL CA: CPT

## 2023-05-22 PROCEDURE — 85027 COMPLETE CBC AUTOMATED: CPT

## 2023-05-25 ENCOUNTER — HOSPITAL ENCOUNTER (OUTPATIENT)
Age: 88
Setting detail: SPECIMEN
Discharge: HOME | End: 2023-05-25

## 2023-05-25 LAB
CRP SERPL HS-MCNC: 73.9 MG/L
HCT VFR BLD CALC: 31.8 % (ref 37–47)
HEMOGLOBIN: 9.2 GM/DL (ref 12.5–16)
MCH RBC QN AUTO: 29.3 PG (ref 27–31)
MCHC RBC AUTO-ENTMCNC: 28.9 % (ref 32–36)
MCV RBC AUTO: 101.3 FL (ref 78–100)
PDW BLD-RTO: 18.4 % (ref 11.7–14.9)
PLATELET # BLD: 235 K/CU MM (ref 140–440)
PMV BLD AUTO: 9.4 FL (ref 7.5–11.1)
RBC # BLD: 3.14 M/CU MM (ref 4.2–5.4)
WBC # BLD: 6.6 K/CU MM (ref 4–10.5)

## 2023-05-25 PROCEDURE — 85027 COMPLETE CBC AUTOMATED: CPT

## 2023-05-25 PROCEDURE — 86140 C-REACTIVE PROTEIN: CPT

## 2023-05-30 ENCOUNTER — HOSPITAL ENCOUNTER (OUTPATIENT)
Age: 88
Setting detail: SPECIMEN
Discharge: HOME | End: 2023-05-30

## 2023-05-30 LAB
ALBUMIN SERPL-MCNC: 3 GM/DL (ref 3.4–5)
ALP BLD-CCNC: 157 IU/L (ref 40–128)
ALT SERPL-CCNC: 7 U/L (ref 10–40)
ANION GAP SERPL CALCULATED.3IONS-SCNC: 9 MMOL/L (ref 4–16)
AST SERPL-CCNC: 10 IU/L (ref 15–37)
BILIRUB SERPL-MCNC: 0.4 MG/DL (ref 0–1)
BUN SERPL-MCNC: 24 MG/DL (ref 6–23)
CALCIUM SERPL-MCNC: 8.3 MG/DL (ref 8.3–10.6)
CHLORIDE BLD-SCNC: 107 MMOL/L (ref 99–110)
CO2: 25 MMOL/L (ref 21–32)
CREAT SERPL-MCNC: 1 MG/DL (ref 0.6–1.1)
GFR SERPL CREATININE-BSD FRML MDRD: 51 ML/MIN/1.73M2
GLUCOSE SERPL-MCNC: 76 MG/DL (ref 70–99)
HCT VFR BLD CALC: 32.5 % (ref 37–47)
HEMOGLOBIN: 9.7 GM/DL (ref 12.5–16)
MCH RBC QN AUTO: 29.6 PG (ref 27–31)
MCHC RBC AUTO-ENTMCNC: 29.8 % (ref 32–36)
MCV RBC AUTO: 99.1 FL (ref 78–100)
PDW BLD-RTO: 17.5 % (ref 11.7–14.9)
PLATELET # BLD: 242 K/CU MM (ref 140–440)
PMV BLD AUTO: 9.8 FL (ref 7.5–11.1)
POTASSIUM SERPL-SCNC: 4.5 MMOL/L (ref 3.5–5.1)
RBC # BLD: 3.28 M/CU MM (ref 4.2–5.4)
SODIUM BLD-SCNC: 141 MMOL/L (ref 135–145)
TOTAL PROTEIN: 5.1 GM/DL (ref 6.4–8.2)
WBC # BLD: 6.5 K/CU MM (ref 4–10.5)

## 2023-05-30 PROCEDURE — 85027 COMPLETE CBC AUTOMATED: CPT

## 2023-05-30 PROCEDURE — 80053 COMPREHEN METABOLIC PANEL: CPT

## 2023-06-02 ENCOUNTER — HOSPITAL ENCOUNTER (OUTPATIENT)
Age: 88
Setting detail: SPECIMEN
Discharge: HOME | End: 2023-06-02

## 2023-06-02 LAB
ALBUMIN SERPL-MCNC: 2.8 GM/DL (ref 3.4–5)
ALP BLD-CCNC: 149 IU/L (ref 40–128)
ALT SERPL-CCNC: 6 U/L (ref 10–40)
ANION GAP SERPL CALCULATED.3IONS-SCNC: 11 MMOL/L (ref 4–16)
AST SERPL-CCNC: 10 IU/L (ref 15–37)
BILIRUB SERPL-MCNC: 0.4 MG/DL (ref 0–1)
BUN SERPL-MCNC: 23 MG/DL (ref 6–23)
CALCIUM SERPL-MCNC: 8 MG/DL (ref 8.3–10.6)
CHLORIDE BLD-SCNC: 108 MMOL/L (ref 99–110)
CO2: 23 MMOL/L (ref 21–32)
CREAT SERPL-MCNC: 0.9 MG/DL (ref 0.6–1.1)
GFR SERPL CREATININE-BSD FRML MDRD: 57 ML/MIN/1.73M2
GLUCOSE SERPL-MCNC: 73 MG/DL (ref 70–99)
HCT VFR BLD CALC: 30 % (ref 37–47)
HEMOGLOBIN: 9 GM/DL (ref 12.5–16)
MCH RBC QN AUTO: 29.7 PG (ref 27–31)
MCHC RBC AUTO-ENTMCNC: 30 % (ref 32–36)
MCV RBC AUTO: 99 FL (ref 78–100)
PDW BLD-RTO: 17.3 % (ref 11.7–14.9)
PLATELET # BLD: 232 K/CU MM (ref 140–440)
PMV BLD AUTO: 10 FL (ref 7.5–11.1)
POTASSIUM SERPL-SCNC: 4.5 MMOL/L (ref 3.5–5.1)
RBC # BLD: 3.03 M/CU MM (ref 4.2–5.4)
SODIUM BLD-SCNC: 142 MMOL/L (ref 135–145)
TOTAL PROTEIN: 4.7 GM/DL (ref 6.4–8.2)
WBC # BLD: 5.9 K/CU MM (ref 4–10.5)

## 2023-06-02 PROCEDURE — 36415 COLL VENOUS BLD VENIPUNCTURE: CPT

## 2023-06-02 PROCEDURE — 80053 COMPREHEN METABOLIC PANEL: CPT

## 2023-06-02 PROCEDURE — 85027 COMPLETE CBC AUTOMATED: CPT

## 2023-06-05 ENCOUNTER — OFFICE (OUTPATIENT)
Dept: URBAN - METROPOLITAN AREA CLINIC 18 | Facility: CLINIC | Age: 88
End: 2023-06-05

## 2023-06-05 VITALS
SYSTOLIC BLOOD PRESSURE: 140 MMHG | DIASTOLIC BLOOD PRESSURE: 74 MMHG | HEIGHT: 62 IN | WEIGHT: 107 LBS | HEART RATE: 69 BPM

## 2023-06-05 DIAGNOSIS — R13.10 DYSPHAGIA, UNSPECIFIED: ICD-10-CM

## 2023-06-05 PROCEDURE — 99213 OFFICE O/P EST LOW 20 MIN: CPT | Mod: SA | Performed by: NURSE PRACTITIONER

## 2023-06-06 DIAGNOSIS — I10 PRIMARY HYPERTENSION: ICD-10-CM

## 2023-06-06 DIAGNOSIS — G25.81 RESTLESS LEGS: ICD-10-CM

## 2023-06-06 RX ORDER — DILTIAZEM HYDROCHLORIDE 240 MG/1
CAPSULE, EXTENDED RELEASE ORAL
Qty: 90 CAPSULE | Refills: 3 | OUTPATIENT
Start: 2023-06-06

## 2023-06-06 RX ORDER — HYDROCHLOROTHIAZIDE 25 MG/1
TABLET ORAL
Qty: 90 TABLET | Refills: 3 | OUTPATIENT
Start: 2023-06-06

## 2023-06-06 RX ORDER — GABAPENTIN 100 MG/1
CAPSULE ORAL
Qty: 180 CAPSULE | Refills: 3 | OUTPATIENT
Start: 2023-06-06

## 2023-06-08 DIAGNOSIS — G25.81 RESTLESS LEGS: ICD-10-CM

## 2023-06-08 DIAGNOSIS — I10 PRIMARY HYPERTENSION: ICD-10-CM

## 2023-06-09 RX ORDER — DILTIAZEM HYDROCHLORIDE 240 MG/1
CAPSULE, EXTENDED RELEASE ORAL
Qty: 90 CAPSULE | Refills: 3 | OUTPATIENT
Start: 2023-06-09

## 2023-06-09 RX ORDER — GABAPENTIN 100 MG/1
CAPSULE ORAL
Qty: 180 CAPSULE | Refills: 3 | OUTPATIENT
Start: 2023-06-09

## 2023-06-09 RX ORDER — HYDROCHLOROTHIAZIDE 25 MG/1
TABLET ORAL
Qty: 90 TABLET | Refills: 3 | OUTPATIENT
Start: 2023-06-09

## 2023-07-12 ENCOUNTER — HOSPITAL ENCOUNTER (OUTPATIENT)
Age: 88
Setting detail: SPECIMEN
Discharge: HOME | End: 2023-07-12
Payer: MEDICARE

## 2023-07-12 LAB
ANION GAP SERPL CALCULATED.3IONS-SCNC: 11 MMOL/L (ref 4–16)
BUN SERPL-MCNC: 22 MG/DL (ref 6–23)
CALCIUM SERPL-MCNC: 8.5 MG/DL (ref 8.3–10.6)
CHLORIDE BLD-SCNC: 106 MMOL/L (ref 99–110)
CO2: 24 MMOL/L (ref 21–32)
CREAT SERPL-MCNC: 0.9 MG/DL (ref 0.6–1.1)
GFR SERPL CREATININE-BSD FRML MDRD: 57 ML/MIN/1.73M2
GLUCOSE SERPL-MCNC: 81 MG/DL (ref 70–99)
POTASSIUM SERPL-SCNC: 4.6 MMOL/L (ref 3.5–5.1)
SODIUM BLD-SCNC: 141 MMOL/L (ref 135–145)

## 2023-07-12 PROCEDURE — 36415 COLL VENOUS BLD VENIPUNCTURE: CPT

## 2023-07-12 PROCEDURE — 80048 BASIC METABOLIC PNL TOTAL CA: CPT

## 2023-10-03 ENCOUNTER — HOSPITAL ENCOUNTER (OUTPATIENT)
Age: 88
Setting detail: SPECIMEN
Discharge: HOME | End: 2023-10-03
Payer: MEDICARE

## 2023-10-03 LAB
ANION GAP SERPL CALCULATED.3IONS-SCNC: 12 MMOL/L (ref 4–16)
BUN SERPL-MCNC: 32 MG/DL (ref 6–23)
CALCIUM SERPL-MCNC: 8.5 MG/DL (ref 8.3–10.6)
CHLORIDE BLD-SCNC: 108 MMOL/L (ref 99–110)
CO2: 25 MMOL/L (ref 21–32)
CREAT SERPL-MCNC: 1.1 MG/DL (ref 0.6–1.1)
GFR SERPL CREATININE-BSD FRML MDRD: 45 ML/MIN/1.73M2
GLUCOSE SERPL-MCNC: 71 MG/DL (ref 70–99)
HCT VFR BLD CALC: 33.7 % (ref 37–47)
HEMOGLOBIN: 9.5 GM/DL (ref 12.5–16)
MCH RBC QN AUTO: 30.2 PG (ref 27–31)
MCHC RBC AUTO-ENTMCNC: 28.2 % (ref 32–36)
MCV RBC AUTO: 107 FL (ref 78–100)
PDW BLD-RTO: 15.7 % (ref 11.7–14.9)
PLATELET # BLD: 156 K/CU MM (ref 140–440)
PMV BLD AUTO: 10.7 FL (ref 7.5–11.1)
POTASSIUM SERPL-SCNC: 4.3 MMOL/L (ref 3.5–5.1)
RBC # BLD: 3.15 M/CU MM (ref 4.2–5.4)
SODIUM BLD-SCNC: 145 MMOL/L (ref 135–145)
WBC # BLD: 5.1 K/CU MM (ref 4–10.5)

## 2023-10-03 PROCEDURE — 36415 COLL VENOUS BLD VENIPUNCTURE: CPT

## 2023-10-03 PROCEDURE — 85027 COMPLETE CBC AUTOMATED: CPT

## 2023-10-03 PROCEDURE — 80048 BASIC METABOLIC PNL TOTAL CA: CPT

## 2023-12-06 ENCOUNTER — HOSPITAL ENCOUNTER (OUTPATIENT)
Age: 88
Setting detail: SPECIMEN
Discharge: HOME | End: 2023-12-06
Payer: MEDICARE

## 2023-12-06 LAB
ALBUMIN SERPL-MCNC: 3.9 GM/DL (ref 3.4–5)
ALP BLD-CCNC: 92 IU/L (ref 40–128)
ALT SERPL-CCNC: 12 U/L (ref 10–40)
ANION GAP SERPL CALCULATED.3IONS-SCNC: 13 MMOL/L (ref 4–16)
AST SERPL-CCNC: 16 IU/L (ref 15–37)
BILIRUB SERPL-MCNC: 0.3 MG/DL (ref 0–1)
BUN SERPL-MCNC: 26 MG/DL (ref 6–23)
CALCIUM SERPL-MCNC: 9.1 MG/DL (ref 8.3–10.6)
CHLORIDE BLD-SCNC: 104 MMOL/L (ref 99–110)
CO2: 24 MMOL/L (ref 21–32)
CREAT SERPL-MCNC: 1.1 MG/DL (ref 0.6–1.1)
GFR SERPL CREATININE-BSD FRML MDRD: 45 ML/MIN/1.73M2
GLUCOSE SERPL-MCNC: 129 MG/DL (ref 70–99)
HCT VFR BLD CALC: 37.4 % (ref 37–47)
HEMOGLOBIN: 11 GM/DL (ref 12.5–16)
MCH RBC QN AUTO: 30.6 PG (ref 27–31)
MCHC RBC AUTO-ENTMCNC: 29.4 % (ref 32–36)
MCV RBC AUTO: 103.9 FL (ref 78–100)
PDW BLD-RTO: 13.9 % (ref 11.7–14.9)
PLATELET # BLD: 170 K/CU MM (ref 140–440)
PMV BLD AUTO: 10.7 FL (ref 7.5–11.1)
POTASSIUM SERPL-SCNC: 4.6 MMOL/L (ref 3.5–5.1)
RBC # BLD: 3.6 M/CU MM (ref 4.2–5.4)
SODIUM BLD-SCNC: 141 MMOL/L (ref 135–145)
TOTAL PROTEIN: 6 GM/DL (ref 6.4–8.2)
WBC # BLD: 5.5 K/CU MM (ref 4–10.5)

## 2023-12-06 PROCEDURE — 36415 COLL VENOUS BLD VENIPUNCTURE: CPT

## 2023-12-06 PROCEDURE — 85027 COMPLETE CBC AUTOMATED: CPT

## 2023-12-06 PROCEDURE — 80053 COMPREHEN METABOLIC PANEL: CPT

## 2024-06-05 ENCOUNTER — HOSPITAL ENCOUNTER (OUTPATIENT)
Age: 89
Setting detail: SPECIMEN
Discharge: HOME | End: 2024-06-05
Payer: MEDICARE

## 2024-06-05 LAB
ALBUMIN SERPL-MCNC: 3.7 GM/DL (ref 3.4–5)
ALP BLD-CCNC: 78 IU/L (ref 40–128)
ALT SERPL-CCNC: 9 U/L (ref 10–40)
ANION GAP SERPL CALCULATED.3IONS-SCNC: 13 MMOL/L (ref 7–16)
AST SERPL-CCNC: 11 IU/L (ref 15–37)
BILIRUB SERPL-MCNC: 0.3 MG/DL (ref 0–1)
BUN SERPL-MCNC: 27 MG/DL (ref 6–23)
CALCIUM SERPL-MCNC: 8.2 MG/DL (ref 8.3–10.6)
CHLORIDE BLD-SCNC: 107 MMOL/L (ref 99–110)
CO2: 25 MMOL/L (ref 21–32)
CREAT SERPL-MCNC: 0.8 MG/DL (ref 0.6–1.1)
GFR, ESTIMATED: 66 ML/MIN/1.73M2
GLUCOSE SERPL-MCNC: 66 MG/DL (ref 70–99)
HCT VFR BLD CALC: 36.2 % (ref 37–47)
HEMOGLOBIN: 10.7 GM/DL (ref 12.5–16)
MCH RBC QN AUTO: 31.9 PG (ref 27–31)
MCHC RBC AUTO-ENTMCNC: 29.6 % (ref 32–36)
MCV RBC AUTO: 108.1 FL (ref 78–100)
PDW BLD-RTO: 13.9 % (ref 11.7–14.9)
PLATELET # BLD: 160 K/CU MM (ref 140–440)
PMV BLD AUTO: 10.4 FL (ref 7.5–11.1)
POTASSIUM SERPL-SCNC: 4.4 MMOL/L (ref 3.5–5.1)
RBC # BLD: 3.35 M/CU MM (ref 4.2–5.4)
SODIUM BLD-SCNC: 145 MMOL/L (ref 135–145)
TOTAL PROTEIN: 5.8 GM/DL (ref 6.4–8.2)
WBC # BLD: 6.5 K/CU MM (ref 4–10.5)

## 2024-06-05 PROCEDURE — 80053 COMPREHEN METABOLIC PANEL: CPT

## 2024-06-05 PROCEDURE — 36415 COLL VENOUS BLD VENIPUNCTURE: CPT

## 2024-06-05 PROCEDURE — 85027 COMPLETE CBC AUTOMATED: CPT

## 2024-12-04 ENCOUNTER — HOSPITAL ENCOUNTER (OUTPATIENT)
Age: 88
Setting detail: SPECIMEN
Discharge: HOME | End: 2024-12-04
Payer: MEDICARE

## 2024-12-04 LAB
ALBUMIN SERPL-MCNC: 3.5 G/DL (ref 3.4–5)
ALBUMIN/GLOB SERPL: 1.8 {RATIO} (ref 1.1–2.2)
ALP SERPL-CCNC: 76 U/L (ref 40–129)
ALT SERPL-CCNC: 11 U/L (ref 10–40)
ANION GAP SERPL CALCULATED.3IONS-SCNC: 10 MMOL/L (ref 9–17)
AST SERPL-CCNC: 13 U/L (ref 15–37)
BILIRUB SERPL-MCNC: 0.3 MG/DL (ref 0–1)
BUN SERPL-MCNC: 27 MG/DL (ref 7–20)
CALCIUM SERPL-MCNC: 9 MG/DL (ref 8.3–10.6)
CHLORIDE SERPL-SCNC: 107 MMOL/L (ref 99–110)
CO2 SERPL-SCNC: 28 MMOL/L (ref 21–32)
CREAT SERPL-MCNC: 1 MG/DL (ref 0.6–1.2)
ERYTHROCYTE [DISTWIDTH] IN BLOOD BY AUTOMATED COUNT: 14.4 % (ref 11.7–14.9)
GFR, ESTIMATED: 52 ML/MIN/1.73M2
GLUCOSE SERPL-MCNC: 64 MG/DL (ref 74–99)
HCT VFR BLD AUTO: 35.7 % (ref 37–47)
HGB BLD-MCNC: 11 G/DL (ref 12.5–16)
MCH RBC QN AUTO: 30.4 PG (ref 27–31)
MCHC RBC AUTO-ENTMCNC: 30.8 G/DL (ref 32–36)
MCV RBC AUTO: 98.6 FL (ref 78–100)
PLATELET # BLD AUTO: 178 K/UL (ref 140–440)
PMV BLD AUTO: 10.7 FL (ref 7.5–11.1)
POTASSIUM SERPL-SCNC: 4.7 MMOL/L (ref 3.5–5.1)
PROT SERPL-MCNC: 5.4 G/DL (ref 6.4–8.2)
RBC # BLD AUTO: 3.62 M/UL (ref 4.2–5.4)
SODIUM SERPL-SCNC: 144 MMOL/L (ref 136–145)
WBC OTHER # BLD: 9 K/UL (ref 4–10.5)

## 2024-12-04 PROCEDURE — 85027 COMPLETE CBC AUTOMATED: CPT

## 2024-12-04 PROCEDURE — 36415 COLL VENOUS BLD VENIPUNCTURE: CPT

## 2024-12-04 PROCEDURE — 80053 COMPREHEN METABOLIC PANEL: CPT

## 2025-02-11 NOTE — DISCHARGE SUMMARY
V2.0  Discharge Summary    Name:  Lukas Quiros /Age/Sex: 1923 (42 y.o. female)   Admit Date: 2022  Discharge Date: 22    MRN & CSN:  [id number] & [id number] Encounter Date and Time 22 11:14 AM EST    Attending:  Juan Saavedra MD Discharging Provider: Juan Saavedra MD, MD       Hospital Course:     Brief HPI: Lukas Quiros is a 80 y.o. female who presents with traumatic subarachnoid bleed        Plan:  Traumatic subarachnoid bleed-due to fall  Denies LOC, however in ER notes it states that she did lose consciousness. .  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 Neurosurgery following. Holding home aspirin for 2 weeks, okay to restart on 23. Neurosurgery states okay to discontinue Keppra. Hypertension-Target SBP less than 150. Head laceration-repaired in ER. The patient expressed appropriate understanding of, and agreement with the discharge recommendations, medications, and plan. Consults this admission:  IP CONSULT TO NEUROSURGERY  IP CONSULT TO HOSPITALIST  IP CONSULT TO NEUROSURGERY  IP CONSULT TO PHARMACY  PHARMACY TO CHANGE BASE FLUIDS  IP CONSULT TO HOME CARE NEEDS    Discharge Diagnosis:   Traumatic subarachnoid bleed with LOC of 1 hour to 5 hours 59 minutes, initial encounter [facility])        Discharge Instruction:   Follow up appointments: PCP, neurosurgery   Primary care physician: Cassia Forbes DO within 2 weeks  Diet: regular diet   Activity: activity as tolerated  Disposition: Discharged to:   []Home, [x]HHC, []SNF, []Acute Rehab, []Hospice   Condition on discharge: Stable  Labs and Tests to be Followed up as an outpatient by PCP or Specialist:   Neurosurgery following. Holding home aspirin for 2 weeks, okay to restart on 23.   Discharge Medications:        Medication List        CHANGE how you take these medications      aspirin 81 MG EC tablet  Take 1 tablet by mouth daily  Start taking on: January 2, 2023  What changed: These instructions start on January 2, 2023. If you are unsure what to do until then, ask your doctor or other care provider. CONTINUE taking these medications      Dilt- MG extended release capsule  Generic drug: dilTIAZem  TAKE 1 CAPSULE BY MOUTH  DAILY     gabapentin 100 MG capsule  Commonly known as: NEURONTIN  Take 1 capsule by mouth nightly for 90 days. hydroCHLOROthiazide 25 MG tablet  Commonly known as: HYDRODIURIL  TAKE 1 TABLET BY MOUTH  DAILY     melatonin 5 MG Tabs tablet               Where to Get Your Medications        These medications were sent to [address] [place] [address] 12, [address] [facility]      Phone: [phone]   aspirin 81 MG EC tablet        Objective Findings at Discharge:   BP (!) 106/44   Pulse 73   Temp 98.3 °F (36.8 °C) (Oral)   Resp 15   Ht 5' 2.5\" (1.588 m)   Wt 105 lb 13.1 oz (48 kg)   SpO2 96%   BMI 19.05 kg/m²       Physical Exam:   Physical Exam  Vitals reviewed. Constitutional:       Appearance: Normal appearance. She is normal weight. Comments: Head  bandage   HENT:      Head: Normocephalic. Right Ear: Tympanic membrane normal.      Left Ear: Tympanic membrane normal.      Nose: Nose normal.      Mouth/Throat:      Mouth: Mucous membranes are moist.   Eyes:      Conjunctiva/sclera: Conjunctivae normal.      Pupils: Pupils are equal, round, and reactive to light. Cardiovascular:      Rate and Rhythm: Normal rate and regular rhythm. Pulses: Normal pulses. Heart sounds: Normal heart sounds. No murmur heard. Pulmonary:      Effort: Pulmonary effort is normal.      Breath sounds: Normal breath sounds. No wheezing, rhonchi or rales. Abdominal:      General: Abdomen is flat. Bowel sounds are normal. There is no distension. Palpations: Abdomen is soft. Tenderness:  There is no abdominal tenderness. Musculoskeletal:         General: Signs of injury present. No deformity. Normal range of motion. Cervical back: Normal range of motion and neck supple. Right lower leg: No edema. Left lower leg: No edema. Skin:     Coloration: Skin is not jaundiced or pale. Neurological:      General: No focal deficit present. Mental Status: She is alert and oriented to person, place, and time. Mental status is at baseline. Motor: Weakness present. Psychiatric:         Mood and Affect: Mood normal.         Behavior: Behavior normal.            Labs and Imaging   XR PELVIS (1-2 VIEWS)    Result Date: 12/18/2022  EXAMINATION: ONE XRAY VIEW OF THE PELVIS 12/18/2022 5:43 pm COMPARISON: 05/18/2021 HISTORY: ORDERING SYSTEM PROVIDED HISTORY: trauma TECHNOLOGIST PROVIDED HISTORY: Reason for exam:->trauma Reason for Exam: trauma FINDINGS: No evidence of pelvic fracture. Bilateral hips demonstrate normal alignment. No focal osseous lesion. SI joints are symmetric. No acute abnormality of the pelvis. If there is clinical suspicion for radiographically occult fracture and the patient is unable to bear weight, recommend MRI. CT HEAD WO CONTRAST    Result Date: 12/19/2022  EXAMINATION: CT OF THE HEAD WITHOUT CONTRAST  12/19/2022 8:12 am TECHNIQUE: CT of the head was performed without the administration of intravenous contrast. Automated exposure control, iterative reconstruction, and/or weight based adjustment of the mA/kV was utilized to reduce the radiation dose to as low as reasonably achievable. COMPARISON: 12/18/2022, 05/18/2021 HISTORY: ORDERING SYSTEM PROVIDED HISTORY: Subarachnoid hemorrhage follow up TECHNOLOGIST PROVIDED HISTORY: Has a \"code stroke\" or \"stroke alert\" been called? ->No Reason for exam:->Subarachnoid hemorrhage follow up Reason for Exam: Subarachnoid hemorrhage follow up FINDINGS: BRAIN/VENTRICLES: The ventricles and cisterns are unchanged in size and configuration. No midline shift or significant mass effect. Acute subarachnoid hemorrhage overlying the left frontoparietal convexity appears unchanged. Hyperdensity within the left temporal lobe is unchanged from 05/18/2021. Small area of encephalomalacia of the left parietal region, similar to prior. The gray-white interface appears grossly unchanged. ORBITS: Unchanged SINUSES: Unchanged SOFT TISSUES/SKULL:  No depressed calvarial fracture. Unchanged small subarachnoid hemorrhage overlying the left frontoparietal region. No acute interval change. CT HEAD WO CONTRAST    Result Date: 12/18/2022  EXAMINATION: CT OF THE HEAD WITHOUT CONTRAST  12/18/2022 5:38 pm TECHNIQUE: CT of the head was performed without the administration of intravenous contrast. Automated exposure control, iterative reconstruction, and/or weight based adjustment of the mA/kV was utilized to reduce the radiation dose to as low as reasonably achievable. COMPARISON: None. HISTORY: ORDERING SYSTEM PROVIDED HISTORY: HEAD TRAUMA, CLOSED, MILD, GCS >= 13, NO RISK FACTORS, NEURO EXAM NORMAL TECHNOLOGIST PROVIDED HISTORY: Has a \"code stroke\" or \"stroke alert\" been called? ->No Reason for exam:->fall/head injury Decision Support Exception - unselect if not a suspected or confirmed emergency medical condition->Emergency Medical Condition (MA) Reason for Exam: fall/head injury; Head trauma, closed, mild, GCS >= 13, no risk factors, neuro exam normal FINDINGS: Brain: There is some subarachnoid blood noted in left frontoparietal region. 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 Bilateral white matter hypodensities are noted in keeping with underlying microvascular disease. No abnormal extra-axial fluid collection. The gray-white differentiation is maintained without evidence of an acute infarct. There is no evidence of hydrocephalus. ORBITS: The visualized portion of the orbits demonstrate no acute abnormality.  SINUSES: The visualized paranasal sinuses and mastoid air cells demonstrate no acute abnormality. SOFT TISSUES/SKULL:  No acute abnormality of the visualized skull or soft tissues. Mild amount of subarachnoid blood noted in the left frontoparietal region. This is not associated with any mass-effect or parenchymal hemorrhage. 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 CT CERVICAL SPINE WO CONTRAST    Result Date: 12/18/2022  EXAMINATION: CT OF THE CERVICAL SPINE WITHOUT CONTRAST 12/18/2022 5:38 pm TECHNIQUE: CT of the cervical spine was performed without the administration of intravenous contrast. Multiplanar reformatted images are provided for review. Automated exposure control, iterative reconstruction, and/or weight based adjustment of the mA/kV was utilized to reduce the radiation dose to as low as reasonably achievable. COMPARISON: None. HISTORY: ORDERING SYSTEM PROVIDED HISTORY: fall TECHNOLOGIST PROVIDED HISTORY: Reason for exam:->fall Decision Support Exception - unselect if not a suspected or confirmed emergency medical condition->Emergency Medical Condition (MA) Reason for Exam: fall FINDINGS: BONES/ALIGNMENT: There is no acute fracture or traumatic malalignment. DEGENERATIVE CHANGES: There are osteoarthritic changes of the left facet joints. SOFT TISSUES: There is no prevertebral soft tissue swelling. No acute abnormality of the cervical spine. XR CHEST PORTABLE    Result Date: 12/18/2022  EXAMINATION: ONE XRAY VIEW OF THE CHEST 12/18/2022 5:43 pm COMPARISON: 02/22/2021. HISTORY: ORDERING SYSTEM PROVIDED HISTORY: fall TECHNOLOGIST PROVIDED HISTORY: Reason for exam:->fall FINDINGS: The cardiac silhouette is unremarkable. Aortic vascular calcification with mild tortuosity. The lungs are clear. No infiltrate, pleural fluid or evidence of overt failure. Calcified granuloma in the right lateral lung base. No acute cardiopulmonary disease.      CTA head neck with contrast    Result Date: 12/18/2022  EXAMINATION: CTA OF THE HEAD AND NECK WITH CONTRAST 12/18/2022 8:28 pm: TECHNIQUE: CTA of the head and neck was performed with the administration of intravenous contrast. Multiplanar reformatted images are provided for review. MIP images are provided for review. Stenosis of the internal carotid arteries measured using NASCET criteria. Automated exposure control, iterative reconstruction, and/or weight based adjustment of the mA/kV was utilized to reduce the radiation dose to as low as reasonably achievable. COMPARISON: Noncontrast CT head done earlier same day. HISTORY: ORDERING SYSTEM PROVIDED HISTORY: [facility] TECHNOLOGIST PROVIDED HISTORY: Reason for exam:->[facility] Has a \"code stroke\" or \"stroke alert\" been called? ->No Decision Support Exception - unselect if not a suspected or confirmed emergency medical condition->Emergency Medical Condition (MA) Reason for Exam: [facility] FINDINGS: CTA NECK: AORTIC ARCH/ARCH VESSELS: No dissection or arterial injury. No significant stenosis of the brachiocephalic or subclavian arteries. Atherosclerosis in the visualized thoracic aorta, right brachiocephalic and bilateral subclavian arteries. CAROTID ARTERIES: No dissection, arterial injury, or hemodynamically significant stenosis by NASCET criteria. Bilateral common carotid artery and carotid bulb atherosclerotic plaque. Mild atherosclerotic plaque in the distal left cervical internal carotid artery. VERTEBRAL ARTERIES: No dissection, arterial injury, or significant stenosis. Scattered atherosclerotic plaque in the bilateral cervical vertebral arteries without hemodynamically significant stenosis. SOFT TISSUES: No focal consolidation in the visualized lungs. Partially visualized small right pleural effusion. No cervical or superior mediastinal lymphadenopathy. The larynx and pharynx are unremarkable. No acute abnormality of the salivary and thyroid glands. BONES: No acute osseous abnormality.   Multilevel degenerative changes in the visualized spine. CTA HEAD: ANTERIOR CIRCULATION: No significant stenosis of the intracranial internal carotid, anterior cerebral, or middle cerebral arteries. No aneurysm. Atherosclerosis in the bilateral intracranial internal carotid arteries without hemodynamically significant stenosis. POSTERIOR CIRCULATION: Focal mild narrowing in the right posterior cerebral artery P2 segment. The right posterior cerebral artery is otherwise patent. No significant stenosis of the vertebral, basilar, or left posterior cerebral arteries. No aneurysm. OTHER: No dural venous sinus thrombosis on this non-dedicated study. BRAIN: Redemonstration of a small amount of acute subarachnoid hemorrhage in the left parietal region. 1.  No focal hemodynamically significant stenosis, occlusion or aneurysm in the large arteries of the head and neck. 2.  Redemonstration of a small amount of acute subarachnoid hemorrhage in the left parietal region. CBC:   Recent Labs     12/18/22 1821   WBC 12.4*   HGB 11.7*        BMP:    Recent Labs     12/18/22 1821 12/19/22  0500    142   K 5.1 4.9    107   CO2 25 23   BUN 31* 26*   CREATININE 1.1 0.9   GLUCOSE 109* 89     Hepatic:   Recent Labs     12/18/22 1821   AST 20   ALT 26   BILITOT 0.3   ALKPHOS 87     Lipids: No results found for: CHOL, HDL, TRIG  Hemoglobin A1C: No results found for: LABA1C  TSH: No results found for: TSH  Troponin: No results found for: TROPONINT  Lactic Acid: No results for input(s): LACTA in the last 72 hours. BNP: No results for input(s): PROBNP in the last 72 hours.   UA:  Lab Results   Component Value Date/Time    NITRU POSITIVE 06/29/2022 01:33 PM    COLORU Yellow 06/29/2022 01:33 PM    PHUR 6.0 06/29/2022 01:33 PM    WBCUA 1627 06/29/2022 01:33 PM    RBCUA 14 06/29/2022 01:33 PM    BACTERIA 3+ 06/29/2022 01:33 PM    CLARITYU TURBID 06/29/2022 01:33 PM    SPECGRAV 1.019 06/29/2022 01:33 PM    LEUKOCYTESUR LARGE 06/29/2022 01:33 PM    UROBILINOGEN 0.2 06/29/2022 01:33 PM    BILIRUBINUR Negative 06/29/2022 01:33 PM    BLOODU MODERATE 06/29/2022 01:33 PM    GLUCOSEU Negative 06/29/2022 01:33 PM    [place] TRACE 06/29/2022 01:33 PM     Urine Cultures:   Lab Results   Component Value Date/Time    LABURIN >100,000 CFU/ml 06/29/2022 11:54 PM     Blood Cultures: No results found for: BC  No results found for: BLOODCULT2  Organism:   Lab Results   Component Value Date/Time    ORG Klebsiella pneumoniae 06/29/2022 11:54 PM       Time Spent Discharging patient 35 minutes    Electronically signed by Tena Medina MD, MD on 12/20/2022 at 11:14 AM Progress slowly

## (undated) DEVICE — Z DISCONTINUED USE 2744636  DRESSING AQUACEL 14 IN ALG W3.5XL14IN POLYUR FLM CVR W/ HYDRCOLL

## (undated) DEVICE — TUBING, SUCTION, 9/32" X 10', STRAIGHT: Brand: MEDLINE

## (undated) DEVICE — GLOVE SURG SZ 65 CRM LTX FREE POLYISOPRENE POLYMER BEAD ANTI

## (undated) DEVICE — SUTURE VCRL SZ 1 L27IN ABSRB UD L36MM CT-1 1/2 CIR JJ40G

## (undated) DEVICE — KIT BNE CEM PREP FEM QUIK-USE 1 PER CA

## (undated) DEVICE — GLOVE ORANGE PI 7   MSG9070

## (undated) DEVICE — DRESSING TRNSPAR W5XL4.5IN FLM SHT SEMIPERMEABLE WIND

## (undated) DEVICE — Z INACTIVE USE 2660664 SOLUTION IRRIG 3000ML 0.9% SOD CHL USP UROMATIC PLAS CONT

## (undated) DEVICE — FAN SPRAY KIT: Brand: PULSAVAC®

## (undated) DEVICE — Device

## (undated) DEVICE — COVER,TABLE,44X90,STERILE: Brand: MEDLINE

## (undated) DEVICE — HIGH CAPACITY 12" INTRAMEDULLARY TIP: Brand: PULSAVAC®

## (undated) DEVICE — DUAL CUT SAGITTAL BLADE

## (undated) DEVICE — BIT DRL L110MM DIA2MM QUIK CONN W/O STP N RADLUC REUSE

## (undated) DEVICE — SUTURE ETHBND EXCEL SZ 5 L30IN NONABSORBABLE GRN L40MM V-37 MB66G

## (undated) DEVICE — ELECTRODE ES AD CRDLSS PT RET REM POLYHESIVE

## (undated) DEVICE — GAUZE,SPONGE,4"X4",16PLY,XRAY,STRL,LF: Brand: MEDLINE

## (undated) DEVICE — PACK PROCEDURE SURG TOT HIP LF

## (undated) DEVICE — GLOVE ORANGE PI 7 1/2   MSG9075

## (undated) DEVICE — SOLUTION IV IRRIG POUR BRL 0.9% SODIUM CHL 2F7124

## (undated) DEVICE — SUTURE VCRL SZ 0 L18IN ABSRB UD L36MM CT-1 1/2 CIR J840D

## (undated) DEVICE — Z INACTIVE PER PHARM Z INACTIVE USE 2530107 SOLUTION ANTISEP 70% ISOPROPYL RUBBING ALC 16 OZ PLAS BTL

## (undated) DEVICE — APPLICATOR MEDICATED 26 CC SOLUTION HI LT ORNG CHLORAPREP

## (undated) DEVICE — TRAY EPI 25GA L3.5IN CONTAIN BPA DEHP PVC PENCAN

## (undated) DEVICE — SUTURE VCRL SZ 2-0 L18IN ABSRB UD CT-1 L36MM 1/2 CIR J839D

## (undated) DEVICE — Device: Brand: POWER-FLO®

## (undated) DEVICE — HOOD, PEEL-AWAY: Brand: FLYTE

## (undated) DEVICE — TOWEL,OR,DSP,ST,BLUE,STD,6/PK,12PK/CS: Brand: MEDLINE

## (undated) DEVICE — YANKAUER,FLEXIBLE HANDLE,REGLR CAPACITY: Brand: MEDLINE INDUSTRIES, INC.

## (undated) DEVICE — DRAPE SHEET ULTRAGARD: Brand: MEDLINE